# Patient Record
Sex: FEMALE | Race: WHITE | NOT HISPANIC OR LATINO | Employment: FULL TIME | URBAN - METROPOLITAN AREA
[De-identification: names, ages, dates, MRNs, and addresses within clinical notes are randomized per-mention and may not be internally consistent; named-entity substitution may affect disease eponyms.]

---

## 2017-01-16 DIAGNOSIS — R53.83 FATIGUE, UNSPECIFIED TYPE: ICD-10-CM

## 2017-01-16 DIAGNOSIS — R06.00 DYSPNEA, UNSPECIFIED TYPE: ICD-10-CM

## 2017-01-16 DIAGNOSIS — R06.00 DYSPNEA, UNSPECIFIED TYPE: Primary | ICD-10-CM

## 2017-01-24 ENCOUNTER — TRANSCRIBE ORDERS (OUTPATIENT)
Dept: CARDIOLOGY | Facility: HOSPITAL | Age: 52
End: 2017-01-24

## 2017-01-24 ENCOUNTER — APPOINTMENT (OUTPATIENT)
Dept: LAB | Facility: HOSPITAL | Age: 52
End: 2017-01-24

## 2017-01-24 ENCOUNTER — HOSPITAL ENCOUNTER (OUTPATIENT)
Dept: CARDIOLOGY | Facility: HOSPITAL | Age: 52
Discharge: HOME | End: 2017-01-24
Admitting: SURGERY

## 2017-01-24 ENCOUNTER — HOSPITAL ENCOUNTER (OUTPATIENT)
Dept: GENERAL RADIOLOGY | Facility: HOSPITAL | Age: 52
Discharge: HOME | End: 2017-01-24

## 2017-01-24 DIAGNOSIS — Z01.818 PRE-OP EVALUATION: Primary | ICD-10-CM

## 2017-01-24 LAB
ALBUMIN SERPL-MCNC: 4.5 G/DL (ref 3.2–4.8)
ALBUMIN/GLOB SERPL: 1.6 G/DL (ref 1.5–2.5)
ALP SERPL-CCNC: 102 U/L (ref 25–100)
ALT SERPL W P-5'-P-CCNC: 23 U/L (ref 7–40)
ANION GAP SERPL CALCULATED.3IONS-SCNC: 6 MMOL/L (ref 3–11)
AST SERPL-CCNC: 21 U/L (ref 0–33)
BASOPHILS # BLD AUTO: 0.02 10*3/MM3 (ref 0–0.2)
BASOPHILS NFR BLD AUTO: 0.3 % (ref 0–1)
BILIRUB SERPL-MCNC: 0.6 MG/DL (ref 0.3–1.2)
BUN BLD-MCNC: 25 MG/DL (ref 9–23)
BUN/CREAT SERPL: 27.8 (ref 7–25)
CALCIUM SPEC-SCNC: 10.3 MG/DL (ref 8.7–10.4)
CHLORIDE SERPL-SCNC: 104 MMOL/L (ref 99–109)
CO2 SERPL-SCNC: 32 MMOL/L (ref 20–31)
CREAT BLD-MCNC: 0.9 MG/DL (ref 0.6–1.3)
DEPRECATED RDW RBC AUTO: 42.8 FL (ref 37–54)
EOSINOPHIL # BLD AUTO: 0.11 10*3/MM3 (ref 0.1–0.3)
EOSINOPHIL NFR BLD AUTO: 1.5 % (ref 0–3)
ERYTHROCYTE [DISTWIDTH] IN BLOOD BY AUTOMATED COUNT: 13.7 % (ref 11.3–14.5)
GFR SERPL CREATININE-BSD FRML MDRD: 66 ML/MIN/1.73
GLOBULIN UR ELPH-MCNC: 2.9 GM/DL
GLUCOSE BLD-MCNC: 83 MG/DL (ref 70–100)
HCT VFR BLD AUTO: 41.7 % (ref 34.5–44)
HGB BLD-MCNC: 13.8 G/DL (ref 11.5–15.5)
IMM GRANULOCYTES # BLD: 0.01 10*3/MM3 (ref 0–0.03)
IMM GRANULOCYTES NFR BLD: 0.1 % (ref 0–0.6)
LYMPHOCYTES # BLD AUTO: 2.29 10*3/MM3 (ref 0.6–4.8)
LYMPHOCYTES NFR BLD AUTO: 31.2 % (ref 24–44)
MCH RBC QN AUTO: 28.2 PG (ref 27–31)
MCHC RBC AUTO-ENTMCNC: 33.1 G/DL (ref 32–36)
MCV RBC AUTO: 85.3 FL (ref 80–99)
MONOCYTES # BLD AUTO: 0.55 10*3/MM3 (ref 0–1)
MONOCYTES NFR BLD AUTO: 7.5 % (ref 0–12)
NEUTROPHILS # BLD AUTO: 4.36 10*3/MM3 (ref 1.5–8.3)
NEUTROPHILS NFR BLD AUTO: 59.4 % (ref 41–71)
PLATELET # BLD AUTO: 207 10*3/MM3 (ref 150–450)
PMV BLD AUTO: 9.6 FL (ref 6–12)
POTASSIUM BLD-SCNC: 4.5 MMOL/L (ref 3.5–5.5)
PROT SERPL-MCNC: 7.4 G/DL (ref 5.7–8.2)
RBC # BLD AUTO: 4.89 10*6/MM3 (ref 3.89–5.14)
SODIUM BLD-SCNC: 142 MMOL/L (ref 132–146)
WBC NRBC COR # BLD: 7.34 10*3/MM3 (ref 3.5–10.8)

## 2017-01-24 PROCEDURE — 85025 COMPLETE CBC W/AUTO DIFF WBC: CPT | Performed by: PHYSICIAN ASSISTANT

## 2017-01-24 PROCEDURE — 36415 COLL VENOUS BLD VENIPUNCTURE: CPT

## 2017-01-24 PROCEDURE — 93005 ELECTROCARDIOGRAM TRACING: CPT | Performed by: SURGERY

## 2017-01-24 PROCEDURE — 71020 HC CHEST PA AND LATERAL: CPT

## 2017-01-24 PROCEDURE — 93010 ELECTROCARDIOGRAM REPORT: CPT | Performed by: INTERNAL MEDICINE

## 2017-01-24 PROCEDURE — 80053 COMPREHEN METABOLIC PANEL: CPT | Performed by: PHYSICIAN ASSISTANT

## 2017-01-31 ENCOUNTER — DOCUMENTATION (OUTPATIENT)
Dept: BARIATRICS/WEIGHT MGMT | Facility: CLINIC | Age: 52
End: 2017-01-31

## 2017-01-31 ENCOUNTER — CONSULT (OUTPATIENT)
Dept: BARIATRICS/WEIGHT MGMT | Facility: CLINIC | Age: 52
End: 2017-01-31

## 2017-01-31 VITALS
DIASTOLIC BLOOD PRESSURE: 72 MMHG | TEMPERATURE: 97.4 F | BODY MASS INDEX: 45.16 KG/M2 | WEIGHT: 264.5 LBS | SYSTOLIC BLOOD PRESSURE: 110 MMHG | HEIGHT: 64 IN | HEART RATE: 57 BPM

## 2017-01-31 DIAGNOSIS — E66.01 OBESITY, CLASS III, BMI 40-49.9 (MORBID OBESITY) (HCC): Primary | ICD-10-CM

## 2017-01-31 PROCEDURE — 99215 OFFICE O/P EST HI 40 MIN: CPT | Performed by: SURGERY

## 2017-01-31 PROCEDURE — 99407 BEHAV CHNG SMOKING > 10 MIN: CPT | Performed by: SURGERY

## 2017-01-31 RX ORDER — ACETAMINOPHEN 10 MG/ML
1000 INJECTION, SOLUTION INTRAVENOUS ONCE
Status: CANCELLED | OUTPATIENT
Start: 2017-01-31 | End: 2017-01-31

## 2017-01-31 RX ORDER — SODIUM CHLORIDE, SODIUM LACTATE, POTASSIUM CHLORIDE, CALCIUM CHLORIDE 600; 310; 30; 20 MG/100ML; MG/100ML; MG/100ML; MG/100ML
150 INJECTION, SOLUTION INTRAVENOUS CONTINUOUS
Status: CANCELLED | OUTPATIENT
Start: 2017-01-31

## 2017-01-31 RX ORDER — SCOLOPAMINE TRANSDERMAL SYSTEM 1 MG/1
1 PATCH, EXTENDED RELEASE TRANSDERMAL ONCE
Status: CANCELLED | OUTPATIENT
Start: 2017-01-31 | End: 2017-01-31

## 2017-01-31 RX ORDER — PANTOPRAZOLE SODIUM 40 MG/10ML
40 INJECTION, POWDER, LYOPHILIZED, FOR SOLUTION INTRAVENOUS ONCE
Status: CANCELLED | OUTPATIENT
Start: 2017-01-31 | End: 2017-01-31

## 2017-01-31 RX ORDER — CHLORHEXIDINE GLUCONATE 0.12 MG/ML
15 RINSE ORAL ONCE
Status: CANCELLED | OUTPATIENT
Start: 2017-01-31

## 2017-01-31 RX ORDER — SODIUM CHLORIDE 0.9 % (FLUSH) 0.9 %
1-10 SYRINGE (ML) INJECTION AS NEEDED
Status: CANCELLED | OUTPATIENT
Start: 2017-01-31

## 2017-01-31 NOTE — PROGRESS NOTES
"[facility] BARIATRIC SURGERY  [address]  [facility] [address] [phone]      Patient  Name:  Liliana Quevedo.  :  1965      Chief Complaint:  weight gain; unable to maintain weight loss. Preop LSG    History of Present Illness:    Liliana Quevedo is a 51 y.o. female who presents today for evaluation, education and consultation regarding weight loss surgery. The patient is interested in sleeve gastrectomy     • Length of time being obese: was six  years.  • Amount of time 100 lbs overweight: was four  years  Most weight lost was 65 lbs with weight watchers, but only able to maintain x 1 year.        Past Medical History   Diagnosis Date   • Allergic rhinitis    • Arthritis    • Carpal tunnel syndrome    • Colon polyp      SIGMOID   • Depression    • Dyspnea on exertion    • Elevated alkaline phosphatase level      102   • Fatigue    • Gastric ulcer    • GERD (gastroesophageal reflux disease)      on Protonix.  Says sx's not bad even if misses meds, \"depends on what I eat\".  EGD GDW  40 cm, path DE reflux.  serum h. pyl neg   • Hematuria    • Hyperlipidemia    • Hypertension      per prim MD note   • Hypothyroidism      w/ hx thyromegaly s/p CASTILLO.    • Incomplete right bundle branch block    • Low back pain    • Mild cardiomegaly    • Morbid obesity    • Nephrolithiasis    • Osteoporosis    • Peripheral edema    • Sleep apnea      CPAP compliant   • Stroke      STROKE SYNDROME   • Tendinitis    • Vitamin D deficiency      Past Surgical History   Procedure Laterality Date   • Ankle surgery Right      dislocation repair w/ plate    • Nose surgery     • Hysterectomy Bilateral      (open) w/ oophorectomy - uterine fibroids and ovarian cysts   • Colonoscopy       screening   • Laparoscopic cholecystectomy       for stones   • Knee surgery Left       ligament repair   • Carpal tunnel release       Allergies   Allergen Reactions   • Ampicillin Hives   • " Sulfa Antibiotics Nausea And Vomiting       Current Outpatient Prescriptions:   •  aspirin 81 MG tablet, Take  by mouth daily., Disp: , Rfl:   •  citalopram (CeleXA) 40 MG tablet, TAKE ONE TABLET BY MOUTH DAILY, Disp: 90 tablet, Rfl: 0  •  furosemide (LASIX) 40 MG tablet, TAKE ONE TABLET BY MOUTH DAILY AS NEEDED **MUST CALL MD FOR APPOINTMENT, Disp: 30 tablet, Rfl: 3  •  KLOR-CON 20 MEQ CR tablet, TAKE ONE TABLET BY MOUTH DAILY, Disp: 30 tablet, Rfl: 2  •  levothyroxine (SYNTHROID, LEVOTHROID) 150 MCG tablet, Take  by mouth daily., Disp: , Rfl:   •  omega-3 acid ethyl esters (LOVAZA) 1 G capsule, Take  by mouth daily., Disp: , Rfl:   •  pantoprazole (PROTONIX) 40 MG EC tablet, TAKE ONE TABLET BY MOUTH DAILY, Disp: 30 tablet, Rfl: 2  •  pravastatin (PRAVACHOL) 20 MG tablet, Take  by mouth daily., Disp: , Rfl:   Social History     Social History   • Marital status:      Spouse name: N/A   • Number of children: N/A   • Years of education: N/A     Occupational History   • Not on file.     Social History Main Topics   • Smoking status: Never Smoker   • Smokeless tobacco: Never Used   • Alcohol use No   • Drug use: No   • Sexual activity: Not on file     Other Topics Concern   • Not on file     Social History Narrative     Family History   Problem Relation Age of Onset   • Cancer Other      COLON CANCER   • Arthritis Other    • Sleep apnea Other    • Ovarian cancer Mother 58   • Obesity Mother    • Diabetes Mother    • Hypertension Mother    • Cancer Mother    • Breast cancer Maternal Aunt      60's   • Obesity Father    • Obesity Sister    • Obesity Brother    • Diabetes Brother    • Hypertension Brother    • Obesity Maternal Grandmother    • Diabetes Maternal Grandmother    • Hypertension Maternal Grandmother    • Heart attack Maternal Grandmother    • Sleep apnea Maternal Grandmother    • Cancer Maternal Grandmother        Review Of Systems   Systemic: Feeling tired (fatigue).  No fever and no chills.  Recent  weight gain.  Breasts: No mammogram concerns, no uterine cancer, and no ovarian cancer.  Patient has had 2 pregnancies previously.  Cardiovascular: No hypertension, no angina, and no ischemic heart disease.  Edema.  No deep venous thrombosis.  Pulmonary: No asthma.  Uses CPAP / BiPAP Suffers from sleep apnea.  No pulmonary embolism.  Dyspnea during exertion.  Gastrointestinal: No dysphagia.  Reports heartburn.  No liver disease, no irritable bowel, and no pancreatic disease.  Genitourinary: Urinary loss of control.  No kidney stones, no kidney failure, and no renal insuff.  Endocrine: No gout and no diabetes.  Dyslipidemia and hypothyroidism.  No abnormal glucose levels.  Hematologic: No bleeding disorder and no prior blood transfusions.  Uses of blood thinners.  No anemia.  Musculoskeletal: No fibromyalgia.  Osteoarthritis, neck pain, wrist joint pain, shoulder joint pain, back pain, hip joint pain, knee joint pain, and ankle joint pain.  Neurological: No migraines, no seizures, and no strokes.  Psychological: No anxiety.  Depression.  No bipolar disorder.  Skin: No history of MRSA skin infections.   All other systems reviewed/negative.    Physical Exam:  Vital Signs:  Weight: 264 lb 8 oz (120 kg)   Body mass index is 46.12 kg/(m^2).  Temp: 97.4 °F (36.3 °C)   Heart Rate: 57   BP: 110/72     Physical Exam   Constitutional: She is oriented to person, place, and time. She appears well-developed and well-nourished.   HENT:   Head: Normocephalic and atraumatic.   Eyes: Conjunctivae are normal. No scleral icterus.   Neck: Neck supple. Carotid bruit is not present. No thyromegaly present.   Cardiovascular: Normal rate and regular rhythm.    No murmur heard.  I do not apprec her murmur on today's exam   Pulmonary/Chest: Effort normal and breath sounds normal. No respiratory distress. She has no wheezes. She has no rales.   Abdominal: Soft. Bowel sounds are normal. She exhibits no distension and no mass. There is no  hepatosplenomegaly. There is no tenderness. No hernia.       Scars:  present; lap billy, pfannenstiel   Musculoskeletal: Normal range of motion. She exhibits no edema.   Neurological: She is alert and oriented to person, place, and time. Gait normal.   Skin: Skin is warm and dry. No rash noted.   Psychiatric: She has a normal mood and affect. Judgment normal.   Vitals reviewed.         Patient Active Problem List   Diagnosis   • Atopic rhinitis   • Anxiety   • Arthralgia of multiple joints   • Knee pain   • Radial styloid tenosynovitis   • Depression   • Edema   • Gastroesophageal reflux disease   • Hyperlipidemia   • Hypothyroidism   • Low back pain   • Adiposity   • Obstructive sleep apnea syndrome   • Onychomycosis of toenail   • Calculus of kidney   • Vitamin D deficiency   • Obesity due to excess calories   • GERD (gastroesophageal reflux disease)   • Sleep apnea   • Morbid obesity   • Fatigue   • Dyspnea on exertion   • Peripheral edema     Psych 6/16 Vahid carrera    Assessment:    Liliana Quevedo is a 51 y.o. year old female with medically complicated obesity pursuing sleeve gastrectomy.    Weight loss surgery is deemed medically necessary given the following obesity related comorbidities including sleep apnea, dyslipidemia, GERD and depression with current Weight: 264 lb 8 oz (120 kg) and Body mass index is 46.12 kg/(m^2)..    Patient is aware that surgery is a tool, and that weight loss is not guaranteed but only seen in the context of appropriate use, follow up and exercise.    Complications  of laparoscopic/possible robotic gastric sleeve were discussed. The patient is well aware of the potential complications of surgery that include but not limited to bleeding, infections, deep venous thrombosis, pulmonary embolism, pulmonary complications such as pneumonia, cardiac events, hernias, small bowel obstruction, damage to the spleen or other organs, bowel injury, disfiguring scars, failure to lose  "weight, need for additional surgery, conversion to an open procedure, and death. Patient is also aware of complications which apply in this particular procedure that can include but are not limited to a \"leak\" at the staple line which in some instances may require conversion to gastric bypass.    Greater than 10 minutes was spent with the patient discussing avoiding all tobacco products and second hand smoke at least 2 weeks pre-operatively and 6 weeks post-operatively to minimize the risk of sleeve leak.  Says kristie smokes outside and in his car only, not hers, will not ride in his car for the requisite period.    Discussed the risks, benefits and alternative therapies at great length as outlined in our extensive consent forms, consent videos, and educational teaching process under the direction of the center's .    A copy of the patient's signed informed consent is on file.      Plan:  The patient has been advised that a letter of medical support must be obtained from her primary care physician or referring provider. A psychological evaluation will be performed for this patient as well. Preoperative testing will include: CBC, CMP, Fasting Lipids, TSH, H.Pylori, CXR, EKG and EGD     Preop Cardiac clearance will be required prior to surgery.      The patient was advised to start a high protein and low carbohydrate diet.  The patient was given individualized information by our dietician along with general group information and instructions.     Information on VLADIMIR educational video was given to the patient.  This is an internet based educational video that explains the surgical procedure chosen and answers basic questions regarding that procedure.     Lastly, the consultation plan was reviewed with the patient.      Sergio Gibbons MD                                                   "

## 2017-01-31 NOTE — LETTER
"2017     Geremias Ramesh MD  [address]  [address]  [facility] [address]    Patient: Liliana Quevedo   YOB: 1965   Date of Visit: 2017       Dear Dr. Domitila MD:    Thank you for referring Liliana Quevedo to me for evaluation. Below are the relevant portions of my assessment and plan of care.    If you have questions, please do not hesitate to call me. I look forward to following Liliana along with you.         Sincerely,        Sergio Gibbons MD        CC: No Recipients  Sergio Gibbons MD  2017  4:16 PM  Signed  [facility] BARIATRIC SURGERY  [address]  [facility] [address] [phone]      Patient  Name:  Liliana Quevedo.  :  1965      Chief Complaint:  weight gain; unable to maintain weight loss. Preop LSG    History of Present Illness:    Liliana Quevedo is a 51 y.o. female who presents today for evaluation, education and consultation regarding weight loss surgery. The patient is interested in sleeve gastrectomy     • Length of time being obese: was six  years.  • Amount of time 100 lbs overweight: was four  years  Most weight lost was 65 lbs with weight watchers, but only able to maintain x 1 year.        Past Medical History   Diagnosis Date   • Allergic rhinitis    • Arthritis    • Carpal tunnel syndrome    • Colon polyp      SIGMOID   • Depression    • Dyspnea on exertion    • Elevated alkaline phosphatase level      102   • Fatigue    • Gastric ulcer    • GERD (gastroesophageal reflux disease)      on Protonix.  Says sx's not bad even if misses meds, \"depends on what I eat\".  EGD GDW  40 cm, path DE reflux.  serum h. pyl neg   • Hematuria    • Hyperlipidemia    • Hypertension      per prim MD note   • Hypothyroidism      w/ hx thyromegaly s/p CASTILLO.    • Incomplete right bundle branch block    • Low back pain    • Mild cardiomegaly    • Morbid obesity    • Nephrolithiasis    • Osteoporosis    • Peripheral " edema    • Sleep apnea      CPAP compliant   • Stroke      STROKE SYNDROME   • Tendinitis    • Vitamin D deficiency      Past Surgical History   Procedure Laterality Date   • Ankle surgery Right 2000     dislocation repair w/ plate    • Nose surgery     • Hysterectomy Bilateral 2004     (open) w/ oophorectomy - uterine fibroids and ovarian cysts   • Colonoscopy  2011     screening   • Laparoscopic cholecystectomy  2011     for stones   • Knee surgery Left 2015      ligament repair   • Carpal tunnel release       Allergies   Allergen Reactions   • Ampicillin Hives   • Sulfa Antibiotics Nausea And Vomiting       Current Outpatient Prescriptions:   •  aspirin 81 MG tablet, Take  by mouth daily., Disp: , Rfl:   •  citalopram (CeleXA) 40 MG tablet, TAKE ONE TABLET BY MOUTH DAILY, Disp: 90 tablet, Rfl: 0  •  furosemide (LASIX) 40 MG tablet, TAKE ONE TABLET BY MOUTH DAILY AS NEEDED **MUST CALL MD FOR APPOINTMENT, Disp: 30 tablet, Rfl: 3  •  KLOR-CON 20 MEQ CR tablet, TAKE ONE TABLET BY MOUTH DAILY, Disp: 30 tablet, Rfl: 2  •  levothyroxine (SYNTHROID, LEVOTHROID) 150 MCG tablet, Take  by mouth daily., Disp: , Rfl:   •  omega-3 acid ethyl esters (LOVAZA) 1 G capsule, Take  by mouth daily., Disp: , Rfl:   •  pantoprazole (PROTONIX) 40 MG EC tablet, TAKE ONE TABLET BY MOUTH DAILY, Disp: 30 tablet, Rfl: 2  •  pravastatin (PRAVACHOL) 20 MG tablet, Take  by mouth daily., Disp: , Rfl:   Social History     Social History   • Marital status:      Spouse name: N/A   • Number of children: N/A   • Years of education: N/A     Occupational History   • Not on file.     Social History Main Topics   • Smoking status: Never Smoker   • Smokeless tobacco: Never Used   • Alcohol use No   • Drug use: No   • Sexual activity: Not on file     Other Topics Concern   • Not on file     Social History Narrative     Family History   Problem Relation Age of Onset   • Cancer Other      COLON CANCER   • Arthritis Other    • Sleep apnea Other    •  Ovarian cancer Mother 58   • Obesity Mother    • Diabetes Mother    • Hypertension Mother    • Cancer Mother    • Breast cancer Maternal Aunt      60's   • Obesity Father    • Obesity Sister    • Obesity Brother    • Diabetes Brother    • Hypertension Brother    • Obesity Maternal Grandmother    • Diabetes Maternal Grandmother    • Hypertension Maternal Grandmother    • Heart attack Maternal Grandmother    • Sleep apnea Maternal Grandmother    • Cancer Maternal Grandmother        Review Of Systems   Systemic: Feeling tired (fatigue).  No fever and no chills.  Recent weight gain.  Breasts: No mammogram concerns, no uterine cancer, and no ovarian cancer.  Patient has had 2 pregnancies previously.  Cardiovascular: No hypertension, no angina, and no ischemic heart disease.  Edema.  No deep venous thrombosis.  Pulmonary: No asthma.  Uses CPAP / BiPAP Suffers from sleep apnea.  No pulmonary embolism.  Dyspnea during exertion.  Gastrointestinal: No dysphagia.  Reports heartburn.  No liver disease, no irritable bowel, and no pancreatic disease.  Genitourinary: Urinary loss of control.  No kidney stones, no kidney failure, and no renal insuff.  Endocrine: No gout and no diabetes.  Dyslipidemia and hypothyroidism.  No abnormal glucose levels.  Hematologic: No bleeding disorder and no prior blood transfusions.  Uses of blood thinners.  No anemia.  Musculoskeletal: No fibromyalgia.  Osteoarthritis, neck pain, wrist joint pain, shoulder joint pain, back pain, hip joint pain, knee joint pain, and ankle joint pain.  Neurological: No migraines, no seizures, and no strokes.  Psychological: No anxiety.  Depression.  No bipolar disorder.  Skin: No history of MRSA skin infections.   All other systems reviewed/negative.    Physical Exam:  Vital Signs:  Weight: 264 lb 8 oz (120 kg)   Body mass index is 46.12 kg/(m^2).  Temp: 97.4 °F (36.3 °C)   Heart Rate: 57   BP: 110/72     Physical Exam   Constitutional: She is oriented to person,  place, and time. She appears well-developed and well-nourished.   HENT:   Head: Normocephalic and atraumatic.   Eyes: Conjunctivae are normal. No scleral icterus.   Neck: Neck supple. Carotid bruit is not present. No thyromegaly present.   Cardiovascular: Normal rate and regular rhythm.    No murmur heard.  I do not apprec her murmur on today's exam   Pulmonary/Chest: Effort normal and breath sounds normal. No respiratory distress. She has no wheezes. She has no rales.   Abdominal: Soft. Bowel sounds are normal. She exhibits no distension and no mass. There is no hepatosplenomegaly. There is no tenderness. No hernia.       Scars:  present; lap billy, pfannenstiel   Musculoskeletal: Normal range of motion. She exhibits no edema.   Neurological: She is alert and oriented to person, place, and time. Gait normal.   Skin: Skin is warm and dry. No rash noted.   Psychiatric: She has a normal mood and affect. Judgment normal.   Vitals reviewed.         Patient Active Problem List   Diagnosis   • Atopic rhinitis   • Anxiety   • Arthralgia of multiple joints   • Knee pain   • Radial styloid tenosynovitis   • Depression   • Edema   • Gastroesophageal reflux disease   • Hyperlipidemia   • Hypothyroidism   • Low back pain   • Adiposity   • Obstructive sleep apnea syndrome   • Onychomycosis of toenail   • Calculus of kidney   • Vitamin D deficiency   • Obesity due to excess calories   • GERD (gastroesophageal reflux disease)   • Sleep apnea   • Morbid obesity   • Fatigue   • Dyspnea on exertion   • Peripheral edema     Psych 6/16 Vahid Arnold neg    Assessment:    Liliana Quevedo is a 51 y.o. year old female with medically complicated obesity pursuing sleeve gastrectomy.    Weight loss surgery is deemed medically necessary given the following obesity related comorbidities including sleep apnea, dyslipidemia, GERD and depression with current Weight: 264 lb 8 oz (120 kg) and Body mass index is 46.12 kg/(m^2)..    Patient is  "aware that surgery is a tool, and that weight loss is not guaranteed but only seen in the context of appropriate use, follow up and exercise.    Complications  of laparoscopic/possible robotic gastric sleeve were discussed. The patient is well aware of the potential complications of surgery that include but not limited to bleeding, infections, deep venous thrombosis, pulmonary embolism, pulmonary complications such as pneumonia, cardiac events, hernias, small bowel obstruction, damage to the spleen or other organs, bowel injury, disfiguring scars, failure to lose weight, need for additional surgery, conversion to an open procedure, and death. Patient is also aware of complications which apply in this particular procedure that can include but are not limited to a \"leak\" at the staple line which in some instances may require conversion to gastric bypass.    Greater than 10 minutes was spent with the patient discussing avoiding all tobacco products and second hand smoke at least 2 weeks pre-operatively and 6 weeks post-operatively to minimize the risk of sleeve leak.  Says kristie smokes outside and in his car only, not hers, will not ride in his car for the requisite period.    Discussed the risks, benefits and alternative therapies at great length as outlined in our extensive consent forms, consent videos, and educational teaching process under the direction of the center's .    A copy of the patient's signed informed consent is on file.      Plan:  The patient has been advised that a letter of medical support must be obtained from her primary care physician or referring provider. A psychological evaluation will be performed for this patient as well. Preoperative testing will include: CBC, CMP, Fasting Lipids, TSH, H.Pylori, CXR, EKG and EGD     Preop Cardiac clearance will be required prior to surgery.      The patient was advised to start a high protein and low carbohydrate diet.  The patient was " given individualized information by our dietician along with general group information and instructions.     Information on VLADIMIR educational video was given to the patient.  This is an internet based educational video that explains the surgical procedure chosen and answers basic questions regarding that procedure.     Lastly, the consultation plan was reviewed with the patient.      Sergio Gibbons MD

## 2017-01-31 NOTE — H&P
Create Note                         My Note 4:07 PM             Edit                            Expand All Collapse All            [facility] BARIATRIC SURGERY    [address]    [facility] [address] [phone]              Patient  Name:  Liliana Quevedo.    :  1965              Chief Complaint:  weight gain; unable to maintain weight loss. Preop LSG         History of Present Illness:         Liliana Quevedo is a 51 y.o. female who presents today for evaluation, education and consultation regarding weight loss surgery. The patient is interested in sleeve gastrectomy                            • Length of time being obese: was six  years.  • Amount of time 100 lbs overweight: was four  years    Most weight lost was 65 lbs with weight watchers, but only able to maintain x 1 year.                     Medical History                                                                                                                                                                                                                                                                                                                                                                                                                                                                                                                                                                                                               Surgical History                                                                                                                                                                                                                                                                                                      Allergies       Allergen     Reactions       •     Ampicillin     Hives       •     Sulfa Antibiotics     Nausea And Vomiting                  Current Outpatient Prescriptions:     •  aspirin 81 MG tablet, Take  by mouth daily., Disp: , Rfl:     •  citalopram (CeleXA) 40 MG tablet, TAKE ONE TABLET BY MOUTH DAILY, Disp: 90 tablet, Rfl: 0    •  furosemide (LASIX) 40 MG tablet, TAKE ONE TABLET BY MOUTH DAILY AS NEEDED **MUST CALL MD FOR APPOINTMENT, Disp: 30 tablet, Rfl: 3    •  KLOR-CON 20 MEQ CR tablet, TAKE ONE TABLET BY MOUTH DAILY, Disp: 30 tablet, Rfl: 2    •  levothyroxine (SYNTHROID, LEVOTHROID) 150 MCG tablet, Take  by mouth daily., Disp: , Rfl:     •  omega-3 acid ethyl esters (LOVAZA) 1 G capsule, Take  by mouth daily., Disp: , Rfl:     •  pantoprazole (PROTONIX) 40 MG EC tablet, TAKE ONE TABLET BY MOUTH DAILY, Disp: 30 tablet, Rfl: 2    •  pravastatin (PRAVACHOL) 20 MG tablet, Take  by mouth daily., Disp: , Rfl:          Social History                                                                                                                                                                                                                                                                                                                 Family History       Problem     Relation     Age of Onset       •     Cancer     Other                         COLON CANCER       •     Arthritis     Other             •     Sleep apnea     Other             •     Ovarian cancer     Mother     58       •     Obesity     Mother             •     Diabetes     Mother             •     Hypertension     Mother             •     Cancer     Mother             •     Breast cancer     Maternal Aunt                         60's       •     Obesity     Father             •     Obesity     Sister             •     Obesity     Brother             •     Diabetes     Brother             •     Hypertension     Brother             •     Obesity     Maternal Grandmother             •     Diabetes     Maternal Grandmother             •     Hypertension     Maternal Grandmother              •     Heart attack     Maternal Grandmother             •     Sleep apnea     Maternal Grandmother             •     Cancer     Maternal Grandmother                       Review Of Systems     Systemic: Feeling tired (fatigue).  No fever and no chills.  Recent weight gain.    Breasts: No mammogram concerns, no uterine cancer, and no ovarian cancer.  Patient has had 2 pregnancies previously.    Cardiovascular: No hypertension, no angina, and no ischemic heart disease.  Edema.  No deep venous thrombosis.    Pulmonary: No asthma.  Uses CPAP / BiPAP Suffers from sleep apnea.  No pulmonary embolism.  Dyspnea during exertion.    Gastrointestinal: No dysphagia.  Reports heartburn.  No liver disease, no irritable bowel, and no pancreatic disease.    Genitourinary: Urinary loss of control.  No kidney stones, no kidney failure, and no renal insuff.    Endocrine: No gout and no diabetes.  Dyslipidemia and hypothyroidism.  No abnormal glucose levels.    Hematologic: No bleeding disorder and no prior blood transfusions.  Uses of blood thinners.  No anemia.    Musculoskeletal: No fibromyalgia.  Osteoarthritis, neck pain, wrist joint pain, shoulder joint pain, back pain, hip joint pain, knee joint pain, and ankle joint pain.    Neurological: No migraines, no seizures, and no strokes.    Psychological: No anxiety.  Depression.  No bipolar disorder.    Skin: No history of MRSA skin infections.     All other systems reviewed/negative.         Physical Exam:    Vital Signs:    Weight: 264 lb 8 oz (120 kg)     Body mass index is 46.12 kg/(m^2).    Temp: 97.4 °F (36.3 °C)     Heart Rate: 57     BP: 110/72          Physical Exam     Constitutional: She is oriented to person, place, and time. She appears well-developed and well-nourished.     HENT:     Head: Normocephalic and atraumatic.     Eyes: Conjunctivae are normal. No scleral icterus.     Neck: Neck supple. Carotid bruit is not present. No thyromegaly present.      Cardiovascular: Normal rate and regular rhythm.      No murmur heard.  I do not apprec her murmur on today's exam     Pulmonary/Chest: Effort normal and breath sounds normal. No respiratory distress. She has no wheezes. She has no rales.     Abdominal: Soft. Bowel sounds are normal. She exhibits no distension and no mass. There is no hepatosplenomegaly. There is no tenderness. No hernia.       Scars:  present; lap billy, pfannenstiel   Musculoskeletal: Normal range of motion. She exhibits no edema.   Neurological: She is alert and oriented to person, place, and time. Gait normal.     Skin: Skin is warm and dry. No rash noted.   Psychiatric: She has a normal mood and affect. Judgment normal.     Vitals reviewed.                          Patient Active Problem List       Diagnosis       •     Atopic rhinitis       •     Anxiety       •     Arthralgia of multiple joints       •     Knee pain       •     Radial styloid tenosynovitis       •     Depression       •     Edema       •     Gastroesophageal reflux disease       •     Hyperlipidemia       •     Hypothyroidism       •     Low back pain       •     Adiposity       •     Obstructive sleep apnea syndrome       •     Onychomycosis of toenail       •     Calculus of kidney       •     Vitamin D deficiency       •     Obesity due to excess calories       •     GERD (gastroesophageal reflux disease)       •     Sleep apnea       •     Morbid obesity       •     Fatigue       •     Dyspnea on exertion       •     Peripheral edema            Psych 6/16 Brown appro    Clayton neg         Assessment:         Liliana Quevedo is a 51 y.o. year old female with medically complicated obesity pursuing sleeve gastrectomy.         Weight loss surgery is deemed medically necessary given the following obesity related comorbidities including sleep apnea, dyslipidemia, GERD and depression with current Weight: 264 lb 8 oz (120 kg) and Body mass index is 46.12 kg/(m^2)..      "    Patient is aware that surgery is a tool, and that weight loss is not guaranteed but only seen in the context of appropriate use, follow up and exercise.         Complications  of laparoscopic/possible robotic gastric sleeve were discussed. The patient is well aware of the potential complications of surgery that include but not limited to bleeding, infections, deep venous thrombosis, pulmonary embolism, pulmonary complications such as pneumonia, cardiac events, hernias, small bowel obstruction, damage to the spleen or other organs, bowel injury, disfiguring scars, failure to lose weight, need for additional surgery, conversion to an open procedure, and death. Patient is also aware of complications which apply in this particular procedure that can include but are not limited to a \"leak\" at the staple line which in some instances may require conversion to gastric bypass.         Greater than 10 minutes was spent with the patient discussing avoiding all tobacco products and second hand smoke at least 2 weeks pre-operatively and 6 weeks post-operatively to minimize the risk of sleeve leak.  Says kristie smokes outside and in his car only, not hers, will not ride in his car for the requisite period.         Discussed the risks, benefits and alternative therapies at great length as outlined in our extensive consent forms, consent videos, and educational teaching process under the direction of the center's .         A copy of the patient's signed informed consent is on file.              Plan:    The patient has been advised that a letter of medical support must be obtained from her primary care physician or referring provider. A psychological evaluation will be performed for this patient as well. Preoperative testing will include: CBC, CMP, Fasting Lipids, TSH, H.Pylori, CXR, EKG and EGD          Preop Cardiac clearance will be required prior to surgery.           The patient was advised to start a high " protein and low carbohydrate diet.  The patient was given individualized information by our dietician along with general group information and instructions.          Information on VLADIMIR educational video was given to the patient.  This is an internet based educational video that explains the surgical procedure chosen and answers basic questions regarding that procedure.          Lastly, the consultation plan was reviewed with the patient.              Sergio Gibbons MD

## 2017-02-08 RX ORDER — POTASSIUM CHLORIDE 1500 MG/1
TABLET, EXTENDED RELEASE ORAL
Qty: 30 TABLET | Refills: 1 | Status: SHIPPED | OUTPATIENT
Start: 2017-02-08 | End: 2017-02-20

## 2017-02-20 ENCOUNTER — APPOINTMENT (OUTPATIENT)
Dept: PREADMISSION TESTING | Facility: HOSPITAL | Age: 52
End: 2017-02-20

## 2017-02-20 LAB
DEPRECATED RDW RBC AUTO: 42.5 FL (ref 37–54)
ERYTHROCYTE [DISTWIDTH] IN BLOOD BY AUTOMATED COUNT: 13.7 % (ref 11.3–14.5)
HBA1C MFR BLD: 5.4 % (ref 4.8–5.6)
HCT VFR BLD AUTO: 41.1 % (ref 34.5–44)
HGB BLD-MCNC: 13.9 G/DL (ref 11.5–15.5)
MCH RBC QN AUTO: 28.9 PG (ref 27–31)
MCHC RBC AUTO-ENTMCNC: 33.8 G/DL (ref 32–36)
MCV RBC AUTO: 85.4 FL (ref 80–99)
PLATELET # BLD AUTO: 194 10*3/MM3 (ref 150–450)
PMV BLD AUTO: 9.4 FL (ref 6–12)
POTASSIUM BLD-SCNC: 3.9 MMOL/L (ref 3.5–5.5)
RBC # BLD AUTO: 4.81 10*6/MM3 (ref 3.89–5.14)
WBC NRBC COR # BLD: 4.39 10*3/MM3 (ref 3.5–10.8)

## 2017-02-20 RX ORDER — FUROSEMIDE 40 MG/1
40 TABLET ORAL DAILY PRN
COMMUNITY
End: 2017-02-23 | Stop reason: HOSPADM

## 2017-02-20 RX ORDER — CITALOPRAM 40 MG/1
40 TABLET ORAL DAILY
COMMUNITY
End: 2017-05-22

## 2017-02-20 RX ORDER — PANTOPRAZOLE SODIUM 40 MG/1
40 TABLET, DELAYED RELEASE ORAL EVERY EVENING
COMMUNITY
End: 2017-05-22

## 2017-02-20 NOTE — DISCHARGE INSTRUCTIONS
The following information and instructions were given:    NPO after MN except sips of water with routine prescribed medication (except blood thinner, diabetes, or weight reducing medication) unless otherwise instructed by your physician.  Do not eat, drink, smoke or chew gum after MN the night before surgery. This also includes no mints.    DO NOT shave, wear makeup or dark nail polish.    Remove all jewelry (advised to go to jeweler if unable to remove).    Leave anything you consider valuable at home.    Leave your suitcase in the car until after your surgery.    Bring the following with you (if applicable)   -picture ID and insurance cards   -Co-pay/deductible required by insurance   -Medications in the original bottles (not a list) including all over-the-counter  medications if not brought to PAT   -Copy of advance directive, living will or power of  documents if not  brought to PAT   -CPAP or BIPAP mask and tubing (do not bring machine)   -Skin prep instructions sheet   -PAT Pass   Education booklet, brochure, handout or binder given to patient.    Pain Control After Surgery handout given to patient.    Respirex use (handout given to patient) and pneumonia prevention.    Signs and Symptoms of infection.    DVT Prevention stressing the importance of ambulation.    Patient to apply Chlorhexadine wipes to surgical area (as instructed) the night before procedure and morning of procedure  Post sleeve sheet given

## 2017-02-22 ENCOUNTER — ANESTHESIA (OUTPATIENT)
Dept: PERIOP | Facility: HOSPITAL | Age: 52
End: 2017-02-22

## 2017-02-22 ENCOUNTER — ANESTHESIA EVENT (OUTPATIENT)
Dept: PERIOP | Facility: HOSPITAL | Age: 52
End: 2017-02-22

## 2017-02-22 ENCOUNTER — HOSPITAL ENCOUNTER (INPATIENT)
Facility: HOSPITAL | Age: 52
LOS: 1 days | Discharge: HOME | End: 2017-02-23
Attending: SURGERY | Admitting: SURGERY

## 2017-02-22 DIAGNOSIS — E66.01 OBESITY, CLASS III, BMI 40-49.9 (MORBID OBESITY) (HCC): ICD-10-CM

## 2017-02-22 PROBLEM — E66.813 OBESITY, CLASS III, BMI 40-49.9 (MORBID OBESITY): Status: ACTIVE | Noted: 2017-02-22

## 2017-02-22 PROCEDURE — C1763 CONN TISS, NON-HUMAN: HCPCS | Performed by: SURGERY

## 2017-02-22 PROCEDURE — 25010000002 DEXAMETHASONE SODIUM PHOSPHATE 10 MG/ML SOLUTION: Performed by: NURSE ANESTHETIST, CERTIFIED REGISTERED

## 2017-02-22 PROCEDURE — 25010000002 MORPHINE PER 10 MG: Performed by: SURGERY

## 2017-02-22 PROCEDURE — 43775 LAP SLEEVE GASTRECTOMY: CPT | Performed by: SURGERY

## 2017-02-22 PROCEDURE — 0DB64Z3 EXCISION OF STOMACH, PERCUTANEOUS ENDOSCOPIC APPROACH, VERTICAL: ICD-10-PCS | Performed by: SURGERY

## 2017-02-22 PROCEDURE — 25010000002 ENOXAPARIN PER 10 MG: Performed by: SURGERY

## 2017-02-22 PROCEDURE — 88307 TISSUE EXAM BY PATHOLOGIST: CPT | Performed by: SURGERY

## 2017-02-22 PROCEDURE — 25010000002 PROPOFOL 10 MG/ML EMULSION: Performed by: NURSE ANESTHETIST, CERTIFIED REGISTERED

## 2017-02-22 PROCEDURE — 0DJ08ZZ INSPECTION OF UPPER INTESTINAL TRACT, VIA NATURAL OR ARTIFICIAL OPENING ENDOSCOPIC: ICD-10-PCS | Performed by: SURGERY

## 2017-02-22 PROCEDURE — 25010000002 NEOSTIGMINE 10 MG/10ML SOLUTION: Performed by: NURSE ANESTHETIST, CERTIFIED REGISTERED

## 2017-02-22 PROCEDURE — 25010000002 BUPRENORPHINE PER 0.1 MG: Performed by: NURSE ANESTHETIST, CERTIFIED REGISTERED

## 2017-02-22 PROCEDURE — 25010000002 FENTANYL CITRATE (PF) 100 MCG/2ML SOLUTION: Performed by: NURSE ANESTHETIST, CERTIFIED REGISTERED

## 2017-02-22 DEVICE — PERI-STRIPS DRY WITH VERITAS COLLAGEN MATRIX (PSD-V) IS PREPARED FROM DEHYDRATED BOVINE PERICARDIUM PROCURED FROM CATTLE UNDER 30 MONTHS OF AGE IN THE UNITED STATES. ONE (1) TUBE OF PSD GEL (GEL) IS PROVIDED FOR EVERY TWO (2) POUCHES OF PSD-V. THE GEL IS USED TO CREATE A TEMPORARY BOND BETWEEN THE PSD-V BUTTRESS AND THE SURGICAL STAPLER JAWS UNTIL THE STAPLER IS POSITIONED AND FIRED.
Type: IMPLANTABLE DEVICE | Site: STOMACH | Status: FUNCTIONAL
Brand: PERI-STRIPS DRY WITH VERITAS COLLAGEN MATRIX

## 2017-02-22 DEVICE — SEALANT FIBRIN TISSEEL FZ 4ML: Type: IMPLANTABLE DEVICE | Site: STOMACH | Status: FUNCTIONAL

## 2017-02-22 DEVICE — INJ DUPLOJECT TISSEEL 4ML: Type: IMPLANTABLE DEVICE | Site: STOMACH | Status: FUNCTIONAL

## 2017-02-22 RX ORDER — HYDROMORPHONE HYDROCHLORIDE 2 MG/1
2 TABLET ORAL EVERY 4 HOURS PRN
Status: DISCONTINUED | OUTPATIENT
Start: 2017-02-22 | End: 2017-02-23 | Stop reason: HOSPADM

## 2017-02-22 RX ORDER — LORAZEPAM 2 MG/ML
0.5 INJECTION INTRAMUSCULAR EVERY 12 HOURS PRN
Status: DISCONTINUED | OUTPATIENT
Start: 2017-02-22 | End: 2017-02-23 | Stop reason: HOSPADM

## 2017-02-22 RX ORDER — CLONIDINE HYDROCHLORIDE 0.1 MG/1
0.1 TABLET ORAL EVERY 6 HOURS PRN
Status: DISCONTINUED | OUTPATIENT
Start: 2017-02-22 | End: 2017-02-23 | Stop reason: HOSPADM

## 2017-02-22 RX ORDER — BUPRENORPHINE HYDROCHLORIDE 0.32 MG/ML
INJECTION INTRAMUSCULAR; INTRAVENOUS AS NEEDED
Status: DISCONTINUED | OUTPATIENT
Start: 2017-02-22 | End: 2017-02-22 | Stop reason: SURG

## 2017-02-22 RX ORDER — LEVOTHYROXINE SODIUM 0.15 MG/1
150 TABLET ORAL
Status: DISCONTINUED | OUTPATIENT
Start: 2017-02-23 | End: 2017-02-23 | Stop reason: HOSPADM

## 2017-02-22 RX ORDER — SODIUM CHLORIDE 9 MG/ML
INJECTION, SOLUTION INTRAVENOUS AS NEEDED
Status: DISCONTINUED | OUTPATIENT
Start: 2017-02-22 | End: 2017-02-22 | Stop reason: HOSPADM

## 2017-02-22 RX ORDER — BUPIVACAINE HYDROCHLORIDE AND EPINEPHRINE 2.5; 5 MG/ML; UG/ML
INJECTION, SOLUTION EPIDURAL; INFILTRATION; INTRACAUDAL; PERINEURAL AS NEEDED
Status: DISCONTINUED | OUTPATIENT
Start: 2017-02-22 | End: 2017-02-22 | Stop reason: HOSPADM

## 2017-02-22 RX ORDER — ROCURONIUM BROMIDE 10 MG/ML
INJECTION, SOLUTION INTRAVENOUS AS NEEDED
Status: DISCONTINUED | OUTPATIENT
Start: 2017-02-22 | End: 2017-02-22 | Stop reason: SURG

## 2017-02-22 RX ORDER — GLYCOPYRROLATE 0.2 MG/ML
INJECTION INTRAMUSCULAR; INTRAVENOUS AS NEEDED
Status: DISCONTINUED | OUTPATIENT
Start: 2017-02-22 | End: 2017-02-22 | Stop reason: SURG

## 2017-02-22 RX ORDER — LABETALOL HYDROCHLORIDE 5 MG/ML
10 INJECTION, SOLUTION INTRAVENOUS
Status: DISCONTINUED | OUTPATIENT
Start: 2017-02-22 | End: 2017-02-23 | Stop reason: HOSPADM

## 2017-02-22 RX ORDER — SODIUM CHLORIDE, SODIUM LACTATE, POTASSIUM CHLORIDE, CALCIUM CHLORIDE 600; 310; 30; 20 MG/100ML; MG/100ML; MG/100ML; MG/100ML
9 INJECTION, SOLUTION INTRAVENOUS CONTINUOUS PRN
Status: CANCELLED | OUTPATIENT
Start: 2017-02-22

## 2017-02-22 RX ORDER — SCOLOPAMINE TRANSDERMAL SYSTEM 1 MG/1
1 PATCH, EXTENDED RELEASE TRANSDERMAL ONCE
Status: DISCONTINUED | OUTPATIENT
Start: 2017-02-22 | End: 2017-02-22

## 2017-02-22 RX ORDER — SODIUM CHLORIDE 0.9 % (FLUSH) 0.9 %
1-10 SYRINGE (ML) INJECTION AS NEEDED
Status: CANCELLED | OUTPATIENT
Start: 2017-02-22

## 2017-02-22 RX ORDER — HYDROMORPHONE HYDROCHLORIDE 1 MG/ML
0.5 INJECTION, SOLUTION INTRAMUSCULAR; INTRAVENOUS; SUBCUTANEOUS
Status: DISCONTINUED | OUTPATIENT
Start: 2017-02-22 | End: 2017-02-22 | Stop reason: HOSPADM

## 2017-02-22 RX ORDER — LIDOCAINE HYDROCHLORIDE 10 MG/ML
0.5 INJECTION, SOLUTION EPIDURAL; INFILTRATION; INTRACAUDAL; PERINEURAL ONCE AS NEEDED
Status: CANCELLED | OUTPATIENT
Start: 2017-02-22

## 2017-02-22 RX ORDER — ONDANSETRON 4 MG/1
4 TABLET, FILM COATED ORAL EVERY 6 HOURS PRN
Status: DISCONTINUED | OUTPATIENT
Start: 2017-02-22 | End: 2017-02-23 | Stop reason: HOSPADM

## 2017-02-22 RX ORDER — PANTOPRAZOLE SODIUM 40 MG/10ML
40 INJECTION, POWDER, LYOPHILIZED, FOR SOLUTION INTRAVENOUS ONCE
Status: COMPLETED | OUTPATIENT
Start: 2017-02-22 | End: 2017-02-22

## 2017-02-22 RX ORDER — FIBRINOGEN HUMAN AND THROMBIN HUMAN 4 ML
KIT TOPICAL AS NEEDED
Status: DISCONTINUED | OUTPATIENT
Start: 2017-02-22 | End: 2017-02-22 | Stop reason: HOSPADM

## 2017-02-22 RX ORDER — ACETAMINOPHEN 10 MG/ML
1000 INJECTION, SOLUTION INTRAVENOUS EVERY 6 HOURS
Status: COMPLETED | OUTPATIENT
Start: 2017-02-22 | End: 2017-02-23

## 2017-02-22 RX ORDER — CYANOCOBALAMIN 1000 UG/ML
1000 INJECTION, SOLUTION INTRAMUSCULAR; SUBCUTANEOUS ONCE
Status: COMPLETED | OUTPATIENT
Start: 2017-02-23 | End: 2017-02-23

## 2017-02-22 RX ORDER — MAGNESIUM HYDROXIDE 1200 MG/15ML
LIQUID ORAL AS NEEDED
Status: DISCONTINUED | OUTPATIENT
Start: 2017-02-22 | End: 2017-02-22 | Stop reason: HOSPADM

## 2017-02-22 RX ORDER — FAMOTIDINE 20 MG/1
20 TABLET, FILM COATED ORAL
Status: CANCELLED | OUTPATIENT
Start: 2017-02-22

## 2017-02-22 RX ORDER — MORPHINE SULFATE 4 MG/ML
4 INJECTION, SOLUTION INTRAMUSCULAR; INTRAVENOUS
Status: DISCONTINUED | OUTPATIENT
Start: 2017-02-22 | End: 2017-02-23 | Stop reason: HOSPADM

## 2017-02-22 RX ORDER — PROMETHAZINE HYDROCHLORIDE 25 MG/ML
12.5 INJECTION, SOLUTION INTRAMUSCULAR; INTRAVENOUS EVERY 4 HOURS PRN
Status: DISCONTINUED | OUTPATIENT
Start: 2017-02-22 | End: 2017-02-23 | Stop reason: HOSPADM

## 2017-02-22 RX ORDER — DIPHENHYDRAMINE HYDROCHLORIDE 50 MG/ML
25 INJECTION INTRAMUSCULAR; INTRAVENOUS EVERY 4 HOURS PRN
Status: DISCONTINUED | OUTPATIENT
Start: 2017-02-22 | End: 2017-02-23 | Stop reason: HOSPADM

## 2017-02-22 RX ORDER — PROMETHAZINE HYDROCHLORIDE 25 MG/ML
12.5 INJECTION, SOLUTION INTRAMUSCULAR; INTRAVENOUS EVERY 6 HOURS PRN
Status: DISCONTINUED | OUTPATIENT
Start: 2017-02-22 | End: 2017-02-23 | Stop reason: HOSPADM

## 2017-02-22 RX ORDER — ACETAMINOPHEN 10 MG/ML
1000 INJECTION, SOLUTION INTRAVENOUS ONCE
Status: COMPLETED | OUTPATIENT
Start: 2017-02-22 | End: 2017-02-22

## 2017-02-22 RX ORDER — SODIUM CHLORIDE, SODIUM LACTATE, POTASSIUM CHLORIDE, CALCIUM CHLORIDE 600; 310; 30; 20 MG/100ML; MG/100ML; MG/100ML; MG/100ML
150 INJECTION, SOLUTION INTRAVENOUS CONTINUOUS
Status: DISCONTINUED | OUTPATIENT
Start: 2017-02-22 | End: 2017-02-22 | Stop reason: HOSPADM

## 2017-02-22 RX ORDER — BUPIVACAINE HCL/0.9 % NACL/PF 0.1 %
3 PLASTIC BAG, INJECTION (ML) EPIDURAL EVERY 8 HOURS
Status: COMPLETED | OUTPATIENT
Start: 2017-02-22 | End: 2017-02-23

## 2017-02-22 RX ORDER — BUPIVACAINE HYDROCHLORIDE 2.5 MG/ML
INJECTION, SOLUTION EPIDURAL; INFILTRATION; INTRACAUDAL AS NEEDED
Status: DISCONTINUED | OUTPATIENT
Start: 2017-02-22 | End: 2017-02-22 | Stop reason: SURG

## 2017-02-22 RX ORDER — ONDANSETRON 2 MG/ML
4 INJECTION INTRAMUSCULAR; INTRAVENOUS EVERY 6 HOURS PRN
Status: DISCONTINUED | OUTPATIENT
Start: 2017-02-22 | End: 2017-02-23 | Stop reason: HOSPADM

## 2017-02-22 RX ORDER — PRAVASTATIN SODIUM 20 MG
20 TABLET ORAL NIGHTLY
Status: DISCONTINUED | OUTPATIENT
Start: 2017-02-22 | End: 2017-02-23 | Stop reason: HOSPADM

## 2017-02-22 RX ORDER — NEOSTIGMINE METHYLSULFATE 1 MG/ML
INJECTION, SOLUTION INTRAVENOUS AS NEEDED
Status: DISCONTINUED | OUTPATIENT
Start: 2017-02-22 | End: 2017-02-22 | Stop reason: SURG

## 2017-02-22 RX ORDER — NALOXONE HCL 0.4 MG/ML
0.4 VIAL (ML) INJECTION
Status: DISCONTINUED | OUTPATIENT
Start: 2017-02-22 | End: 2017-02-23 | Stop reason: HOSPADM

## 2017-02-22 RX ORDER — METOCLOPRAMIDE HYDROCHLORIDE 5 MG/ML
10 INJECTION INTRAMUSCULAR; INTRAVENOUS EVERY 6 HOURS PRN
Status: DISCONTINUED | OUTPATIENT
Start: 2017-02-22 | End: 2017-02-23 | Stop reason: HOSPADM

## 2017-02-22 RX ORDER — FENTANYL CITRATE 50 UG/ML
50 INJECTION, SOLUTION INTRAMUSCULAR; INTRAVENOUS
Status: DISCONTINUED | OUTPATIENT
Start: 2017-02-22 | End: 2017-02-22 | Stop reason: HOSPADM

## 2017-02-22 RX ORDER — LORAZEPAM 1 MG/1
1 TABLET ORAL EVERY 12 HOURS PRN
Status: DISCONTINUED | OUTPATIENT
Start: 2017-02-22 | End: 2017-02-23 | Stop reason: HOSPADM

## 2017-02-22 RX ORDER — DEXAMETHASONE SODIUM PHOSPHATE 10 MG/ML
INJECTION, SOLUTION INTRAMUSCULAR; INTRAVENOUS AS NEEDED
Status: DISCONTINUED | OUTPATIENT
Start: 2017-02-22 | End: 2017-02-22 | Stop reason: SURG

## 2017-02-22 RX ORDER — BUPIVACAINE HCL/0.9 % NACL/PF 0.1 %
3 PLASTIC BAG, INJECTION (ML) EPIDURAL ONCE
Status: DISCONTINUED | OUTPATIENT
Start: 2017-02-22 | End: 2017-02-22 | Stop reason: HOSPADM

## 2017-02-22 RX ORDER — SODIUM CHLORIDE, SODIUM LACTATE, POTASSIUM CHLORIDE, CALCIUM CHLORIDE 600; 310; 30; 20 MG/100ML; MG/100ML; MG/100ML; MG/100ML
150 INJECTION, SOLUTION INTRAVENOUS CONTINUOUS
Status: DISCONTINUED | OUTPATIENT
Start: 2017-02-22 | End: 2017-02-23 | Stop reason: HOSPADM

## 2017-02-22 RX ORDER — ATRACURIUM BESYLATE 10 MG/ML
INJECTION, SOLUTION INTRAVENOUS AS NEEDED
Status: DISCONTINUED | OUTPATIENT
Start: 2017-02-22 | End: 2017-02-22 | Stop reason: SURG

## 2017-02-22 RX ORDER — FAMOTIDINE 10 MG/ML
20 INJECTION, SOLUTION INTRAVENOUS
Status: CANCELLED | OUTPATIENT
Start: 2017-02-22

## 2017-02-22 RX ORDER — PROPOFOL 10 MG/ML
VIAL (ML) INTRAVENOUS AS NEEDED
Status: DISCONTINUED | OUTPATIENT
Start: 2017-02-22 | End: 2017-02-22 | Stop reason: SURG

## 2017-02-22 RX ORDER — SODIUM CHLORIDE 0.9 % (FLUSH) 0.9 %
1-10 SYRINGE (ML) INJECTION AS NEEDED
Status: DISCONTINUED | OUTPATIENT
Start: 2017-02-22 | End: 2017-02-22 | Stop reason: HOSPADM

## 2017-02-22 RX ORDER — CHLORHEXIDINE GLUCONATE 0.12 MG/ML
15 RINSE ORAL ONCE
Status: COMPLETED | OUTPATIENT
Start: 2017-02-22 | End: 2017-02-22

## 2017-02-22 RX ORDER — CITALOPRAM 40 MG/1
40 TABLET ORAL DAILY
Status: DISCONTINUED | OUTPATIENT
Start: 2017-02-23 | End: 2017-02-23 | Stop reason: HOSPADM

## 2017-02-22 RX ORDER — LIDOCAINE HYDROCHLORIDE 20 MG/ML
INJECTION, SOLUTION INFILTRATION; PERINEURAL AS NEEDED
Status: DISCONTINUED | OUTPATIENT
Start: 2017-02-22 | End: 2017-02-22 | Stop reason: SURG

## 2017-02-22 RX ORDER — SODIUM CHLORIDE AND POTASSIUM CHLORIDE 150; 450 MG/100ML; MG/100ML
125 INJECTION, SOLUTION INTRAVENOUS CONTINUOUS
Status: DISCONTINUED | OUTPATIENT
Start: 2017-02-23 | End: 2017-02-23 | Stop reason: HOSPADM

## 2017-02-22 RX ORDER — LIDOCAINE HYDROCHLORIDE 10 MG/ML
0.5 INJECTION, SOLUTION EPIDURAL; INFILTRATION; INTRACAUDAL; PERINEURAL ONCE AS NEEDED
Status: COMPLETED | OUTPATIENT
Start: 2017-02-22 | End: 2017-02-22

## 2017-02-22 RX ORDER — MORPHINE SULFATE 2 MG/ML
6 INJECTION, SOLUTION INTRAMUSCULAR; INTRAVENOUS
Status: DISCONTINUED | OUTPATIENT
Start: 2017-02-22 | End: 2017-02-23 | Stop reason: HOSPADM

## 2017-02-22 RX ORDER — SIMETHICONE 80 MG
80 TABLET,CHEWABLE ORAL 4 TIMES DAILY PRN
Status: DISCONTINUED | OUTPATIENT
Start: 2017-02-22 | End: 2017-02-23 | Stop reason: HOSPADM

## 2017-02-22 RX ORDER — PANTOPRAZOLE SODIUM 40 MG/10ML
40 INJECTION, POWDER, LYOPHILIZED, FOR SOLUTION INTRAVENOUS
Status: DISCONTINUED | OUTPATIENT
Start: 2017-02-23 | End: 2017-02-23 | Stop reason: HOSPADM

## 2017-02-22 RX ORDER — HYDROCODONE BITARTRATE AND ACETAMINOPHEN 7.5; 325 MG/1; MG/1
1 TABLET ORAL EVERY 4 HOURS PRN
Status: DISCONTINUED | OUTPATIENT
Start: 2017-02-22 | End: 2017-02-23 | Stop reason: HOSPADM

## 2017-02-22 RX ADMIN — Medication 3 G: at 12:45

## 2017-02-22 RX ADMIN — MORPHINE SULFATE 4 MG: 4 INJECTION, SOLUTION INTRAMUSCULAR; INTRAVENOUS at 21:07

## 2017-02-22 RX ADMIN — BUPIVACAINE HYDROCHLORIDE 30 ML: 2.5 INJECTION, SOLUTION EPIDURAL; INFILTRATION; INTRACAUDAL; PERINEURAL at 14:08

## 2017-02-22 RX ADMIN — SODIUM CHLORIDE, POTASSIUM CHLORIDE, SODIUM LACTATE AND CALCIUM CHLORIDE 150 ML/HR: 600; 310; 30; 20 INJECTION, SOLUTION INTRAVENOUS at 13:20

## 2017-02-22 RX ADMIN — ROBINUL 0.4 MG: 0.2 INJECTION INTRAMUSCULAR; INTRAVENOUS at 15:20

## 2017-02-22 RX ADMIN — CHLORHEXIDINE GLUCONATE 15 ML: 1.2 RINSE ORAL at 12:41

## 2017-02-22 RX ADMIN — LIDOCAINE HYDROCHLORIDE 30 MG: 20 INJECTION, SOLUTION INFILTRATION; PERINEURAL at 14:03

## 2017-02-22 RX ADMIN — ROCURONIUM BROMIDE 50 MG: 10 INJECTION INTRAVENOUS at 14:04

## 2017-02-22 RX ADMIN — NEOSTIGMINE METHYLSULFATE 3 MG: 1 INJECTION, SOLUTION INTRAVENOUS at 15:20

## 2017-02-22 RX ADMIN — ROBINUL 0.1 MG: 0.2 INJECTION INTRAMUSCULAR; INTRAVENOUS at 14:33

## 2017-02-22 RX ADMIN — SCOPALAMINE 1 PATCH: 1 PATCH, EXTENDED RELEASE TRANSDERMAL at 12:40

## 2017-02-22 RX ADMIN — LIDOCAINE HYDROCHLORIDE 0.5 ML: 10 INJECTION, SOLUTION EPIDURAL; INFILTRATION; INTRACAUDAL; PERINEURAL at 12:41

## 2017-02-22 RX ADMIN — SODIUM CHLORIDE, POTASSIUM CHLORIDE, SODIUM LACTATE AND CALCIUM CHLORIDE 150 ML/HR: 600; 310; 30; 20 INJECTION, SOLUTION INTRAVENOUS at 22:59

## 2017-02-22 RX ADMIN — BUPRENORPHINE HYDROCHLORIDE 150 MCG: 0.3 INJECTION INTRAMUSCULAR; INTRAVENOUS at 14:06

## 2017-02-22 RX ADMIN — SODIUM CHLORIDE, POTASSIUM CHLORIDE, SODIUM LACTATE AND CALCIUM CHLORIDE 1000 ML: 600; 310; 30; 20 INJECTION, SOLUTION INTRAVENOUS at 12:41

## 2017-02-22 RX ADMIN — DEXAMETHASONE SODIUM PHOSPHATE 2 MG: 10 INJECTION, SOLUTION INTRAMUSCULAR; INTRAVENOUS at 14:08

## 2017-02-22 RX ADMIN — ENOXAPARIN SODIUM 40 MG: 40 INJECTION SUBCUTANEOUS at 12:15

## 2017-02-22 RX ADMIN — ATRACURIUM BESYLATE 10 MG: 10 INJECTION, SOLUTION INTRAVENOUS at 14:45

## 2017-02-22 RX ADMIN — BUPIVACAINE HYDROCHLORIDE 30 ML: 2.5 INJECTION, SOLUTION EPIDURAL; INFILTRATION; INTRACAUDAL; PERINEURAL at 14:06

## 2017-02-22 RX ADMIN — PROPOFOL 200 MG: 10 INJECTION, EMULSION INTRAVENOUS at 14:04

## 2017-02-22 RX ADMIN — DEXAMETHASONE SODIUM PHOSPHATE 2 MG: 10 INJECTION, SOLUTION INTRAMUSCULAR; INTRAVENOUS at 14:06

## 2017-02-22 RX ADMIN — ACETAMINOPHEN 1000 MG: 10 INJECTION, SOLUTION INTRAVENOUS at 20:32

## 2017-02-22 RX ADMIN — CHLORHEXIDINE GLUCONATE 15 ML: 1.2 RINSE ORAL at 12:30

## 2017-02-22 RX ADMIN — BUPRENORPHINE HYDROCHLORIDE 150 MCG: 0.3 INJECTION INTRAMUSCULAR; INTRAVENOUS at 14:08

## 2017-02-22 RX ADMIN — PANTOPRAZOLE SODIUM 40 MG: 40 INJECTION, POWDER, FOR SOLUTION INTRAVENOUS at 12:41

## 2017-02-22 RX ADMIN — Medication 3 G: at 21:02

## 2017-02-22 RX ADMIN — FENTANYL CITRATE 50 MCG: 50 INJECTION, SOLUTION INTRAMUSCULAR; INTRAVENOUS at 16:53

## 2017-02-22 RX ADMIN — PRAVASTATIN SODIUM 20 MG: 20 TABLET ORAL at 20:32

## 2017-02-22 RX ADMIN — ACETAMINOPHEN 1000 MG: 10 INJECTION, SOLUTION INTRAVENOUS at 14:37

## 2017-02-22 RX ADMIN — EPHEDRINE SULFATE 12.5 MG: 50 INJECTION INTRAMUSCULAR; INTRAVENOUS; SUBCUTANEOUS at 14:15

## 2017-02-22 RX ADMIN — SODIUM CHLORIDE, POTASSIUM CHLORIDE, SODIUM LACTATE AND CALCIUM CHLORIDE 150 ML/HR: 600; 310; 30; 20 INJECTION, SOLUTION INTRAVENOUS at 17:58

## 2017-02-22 NOTE — ANESTHESIA POSTPROCEDURE EVALUATION
Patient: Liliana Quevedo    Procedure Summary     Date Anesthesia Start Anesthesia Stop Room / Location    02/22/17 1400 1545  [facility] / [facility]       Procedure Diagnosis Surgeon Provider    GASTRIC SLEEVE LAPAROSCOPIC (N/A Abdomen) Obesity, Class III, BMI 40-49.9 (morbid obesity)  (Obesity, Class III, BMI 40-49.9 (morbid obesity) [E66.01]) MD Carlos Locke MD          Anesthesia Type: general, regional  Last vitals  /67 (02/22/17 1540)    Temp 97.7 °F (36.5 °C) (02/22/17 1540)    Pulse 61 (02/22/17 1540)   Resp 16 (02/22/17 1540)    SpO2 99 % (02/22/17 1540)      Post Anesthesia Care and Evaluation    Patient location during evaluation: PACU  Patient participation: complete - patient participated  Level of consciousness: awake and alert  Pain score: 0  Pain management: adequate  Airway patency: patent  Anesthetic complications: No anesthetic complications  PONV Status: none  Cardiovascular status: acceptable  Respiratory status: acceptable  Hydration status: acceptable

## 2017-02-22 NOTE — ANESTHESIA PROCEDURE NOTES
"Peripheral Block    Patient location during procedure: pre-op  Start time: 2/22/2017 2:05 PM  Stop time: 2/22/2017 2:10 PM  Reason for block: at surgeon's request and post-op pain management  Performed by  Anesthesiologist: KELLY VARGAS  Preanesthetic Checklist  Completed: patient identified, site marked, surgical consent, pre-op evaluation, timeout performed, IV checked, risks and benefits discussed and monitors and equipment checked  Peripheral Block Prep:  Sterile barriers:cap, gloves, sterile barriers and mask  Prep: ChloraPrep  Patient monitoring: blood pressure monitoring, continuous pulse oximetry and EKG  Peripheral Procedure  Nursing cardiac assessment comments yes: Sedation, GA, Spinal,Epidural   Guidance:ultrasound guided  Images:still images obtained  Laterality:BilateralBlock Type:TAP  Injection Technique:single-shotNeedle Type:short-bevel  Needle Gauge:20 G  Needle gauge: 20g 4\" Stimuplex.   Medications  Comment:Block Injection:  LA dose divided between Right and Left block       Adjuncts:  Decadron 4mg PSF, Buprenex 0.3mg (Per total volume of LA)  Local Injected:bupivacaine 0.25% without epinephrine Local Amount Injected:60mL  Post Assessment  Patient Tolerance:comfortable throughout block  Complications:no  Additional Notes  The pt was placed in the Supine Position and was  anesthetized with: GA    Under Ultrasound guidance, a BBraun 4inch 360 degree needle was advanced with Normal Saline hydro dissection of tissue.  The Internal Oblique and Transversus Abdominus muscles where visualized.  At or before the aponeurosis of Internal Oblique, local anesthetic spread was visualized in the Transversus Abdominus Plane. Injection was made incrementally with aspiration every 5 mls.  There was no  intravascular injection,  injection pressure was normal, there was no neural injection, and the procedure was completed without difficulty.  Thank You.            "

## 2017-02-22 NOTE — H&P (VIEW-ONLY)
"[facility] BARIATRIC SURGERY  [address]  Formerly [facility] [id number] [phone]      Patient  Name:  Liliana Quevedo.  :  1965      Chief Complaint:  weight gain; unable to maintain weight loss. Preop LSG    History of Present Illness:    Liliana Quevedo is a 51 y.o. female who presents today for evaluation, education and consultation regarding weight loss surgery. The patient is interested in sleeve gastrectomy     • Length of time being obese: was six  years.  • Amount of time 100 lbs overweight: was four  years  Most weight lost was 65 lbs with weight watchers, but only able to maintain x 1 year.        Past Medical History   Diagnosis Date   • Allergic rhinitis    • Arthritis    • Carpal tunnel syndrome    • Colon polyp      SIGMOID   • Depression    • Dyspnea on exertion    • Elevated alkaline phosphatase level      102   • Fatigue    • Gastric ulcer    • GERD (gastroesophageal reflux disease)      on Protonix.  Says sx's not bad even if misses meds, \"depends on what I eat\".  EGD GDW  40 cm, path DE reflux.  serum h. pyl neg   • Hematuria    • Hyperlipidemia    • Hypertension      per prim MD note   • Hypothyroidism      w/ hx thyromegaly s/p CASTILLO.    • Incomplete right bundle branch block    • Low back pain    • Mild cardiomegaly    • Morbid obesity    • Nephrolithiasis    • Osteoporosis    • Peripheral edema    • Sleep apnea      CPAP compliant   • Stroke      STROKE SYNDROME   • Tendinitis    • Vitamin D deficiency      Past Surgical History   Procedure Laterality Date   • Ankle surgery Right      dislocation repair w/ plate    • Nose surgery     • Hysterectomy Bilateral      (open) w/ oophorectomy - uterine fibroids and ovarian cysts   • Colonoscopy       screening   • Laparoscopic cholecystectomy       for stones   • Knee surgery Left       ligament repair   • Carpal tunnel release       Allergies   Allergen Reactions   • Ampicillin Hives   • " Sulfa Antibiotics Nausea And Vomiting       Current Outpatient Prescriptions:   •  aspirin 81 MG tablet, Take  by mouth daily., Disp: , Rfl:   •  citalopram (CeleXA) 40 MG tablet, TAKE ONE TABLET BY MOUTH DAILY, Disp: 90 tablet, Rfl: 0  •  furosemide (LASIX) 40 MG tablet, TAKE ONE TABLET BY MOUTH DAILY AS NEEDED **MUST CALL MD FOR APPOINTMENT, Disp: 30 tablet, Rfl: 3  •  KLOR-CON 20 MEQ CR tablet, TAKE ONE TABLET BY MOUTH DAILY, Disp: 30 tablet, Rfl: 2  •  levothyroxine (SYNTHROID, LEVOTHROID) 150 MCG tablet, Take  by mouth daily., Disp: , Rfl:   •  omega-3 acid ethyl esters (LOVAZA) 1 G capsule, Take  by mouth daily., Disp: , Rfl:   •  pantoprazole (PROTONIX) 40 MG EC tablet, TAKE ONE TABLET BY MOUTH DAILY, Disp: 30 tablet, Rfl: 2  •  pravastatin (PRAVACHOL) 20 MG tablet, Take  by mouth daily., Disp: , Rfl:   Social History     Social History   • Marital status:      Spouse name: N/A   • Number of children: N/A   • Years of education: N/A     Occupational History   • Not on file.     Social History Main Topics   • Smoking status: Never Smoker   • Smokeless tobacco: Never Used   • Alcohol use No   • Drug use: No   • Sexual activity: Not on file     Other Topics Concern   • Not on file     Social History Narrative     Family History   Problem Relation Age of Onset   • Cancer Other      COLON CANCER   • Arthritis Other    • Sleep apnea Other    • Ovarian cancer Mother 58   • Obesity Mother    • Diabetes Mother    • Hypertension Mother    • Cancer Mother    • Breast cancer Maternal Aunt      60's   • Obesity Father    • Obesity Sister    • Obesity Brother    • Diabetes Brother    • Hypertension Brother    • Obesity Maternal Grandmother    • Diabetes Maternal Grandmother    • Hypertension Maternal Grandmother    • Heart attack Maternal Grandmother    • Sleep apnea Maternal Grandmother    • Cancer Maternal Grandmother        Review Of Systems   Systemic: Feeling tired (fatigue).  No fever and no chills.  Recent  weight gain.  Breasts: No mammogram concerns, no uterine cancer, and no ovarian cancer.  Patient has had 2 pregnancies previously.  Cardiovascular: No hypertension, no angina, and no ischemic heart disease.  Edema.  No deep venous thrombosis.  Pulmonary: No asthma.  Uses CPAP / BiPAP Suffers from sleep apnea.  No pulmonary embolism.  Dyspnea during exertion.  Gastrointestinal: No dysphagia.  Reports heartburn.  No liver disease, no irritable bowel, and no pancreatic disease.  Genitourinary: Urinary loss of control.  No kidney stones, no kidney failure, and no renal insuff.  Endocrine: No gout and no diabetes.  Dyslipidemia and hypothyroidism.  No abnormal glucose levels.  Hematologic: No bleeding disorder and no prior blood transfusions.  Uses of blood thinners.  No anemia.  Musculoskeletal: No fibromyalgia.  Osteoarthritis, neck pain, wrist joint pain, shoulder joint pain, back pain, hip joint pain, knee joint pain, and ankle joint pain.  Neurological: No migraines, no seizures, and no strokes.  Psychological: No anxiety.  Depression.  No bipolar disorder.  Skin: No history of MRSA skin infections.   All other systems reviewed/negative.    Physical Exam:  Vital Signs:  Weight: 264 lb 8 oz (120 kg)   Body mass index is 46.12 kg/(m^2).  Temp: 97.4 °F (36.3 °C)   Heart Rate: 57   BP: 110/72     Physical Exam   Constitutional: She is oriented to person, place, and time. She appears well-developed and well-nourished.   HENT:   Head: Normocephalic and atraumatic.   Eyes: Conjunctivae are normal. No scleral icterus.   Neck: Neck supple. Carotid bruit is not present. No thyromegaly present.   Cardiovascular: Normal rate and regular rhythm.    No murmur heard.  I do not apprec her murmur on today's exam   Pulmonary/Chest: Effort normal and breath sounds normal. No respiratory distress. She has no wheezes. She has no rales.   Abdominal: Soft. Bowel sounds are normal. She exhibits no distension and no mass. There is no  hepatosplenomegaly. There is no tenderness. No hernia.       Scars:  present; lap billy, pfannenstiel   Musculoskeletal: Normal range of motion. She exhibits no edema.   Neurological: She is alert and oriented to person, place, and time. Gait normal.   Skin: Skin is warm and dry. No rash noted.   Psychiatric: She has a normal mood and affect. Judgment normal.   Vitals reviewed.         Patient Active Problem List   Diagnosis   • Atopic rhinitis   • Anxiety   • Arthralgia of multiple joints   • Knee pain   • Radial styloid tenosynovitis   • Depression   • Edema   • Gastroesophageal reflux disease   • Hyperlipidemia   • Hypothyroidism   • Low back pain   • Adiposity   • Obstructive sleep apnea syndrome   • Onychomycosis of toenail   • Calculus of kidney   • Vitamin D deficiency   • Obesity due to excess calories   • GERD (gastroesophageal reflux disease)   • Sleep apnea   • Morbid obesity   • Fatigue   • Dyspnea on exertion   • Peripheral edema     Psych 6/16 Vahid carrera    Assessment:    Liliana Quevedo is a 51 y.o. year old female with medically complicated obesity pursuing sleeve gastrectomy.    Weight loss surgery is deemed medically necessary given the following obesity related comorbidities including sleep apnea, dyslipidemia, GERD and depression with current Weight: 264 lb 8 oz (120 kg) and Body mass index is 46.12 kg/(m^2)..    Patient is aware that surgery is a tool, and that weight loss is not guaranteed but only seen in the context of appropriate use, follow up and exercise.    Complications  of laparoscopic/possible robotic gastric sleeve were discussed. The patient is well aware of the potential complications of surgery that include but not limited to bleeding, infections, deep venous thrombosis, pulmonary embolism, pulmonary complications such as pneumonia, cardiac events, hernias, small bowel obstruction, damage to the spleen or other organs, bowel injury, disfiguring scars, failure to lose  "weight, need for additional surgery, conversion to an open procedure, and death. Patient is also aware of complications which apply in this particular procedure that can include but are not limited to a \"leak\" at the staple line which in some instances may require conversion to gastric bypass.    Greater than 10 minutes was spent with the patient discussing avoiding all tobacco products and second hand smoke at least 2 weeks pre-operatively and 6 weeks post-operatively to minimize the risk of sleeve leak.  Says kristie smokes outside and in his car only, not hers, will not ride in his car for the requisite period.    Discussed the risks, benefits and alternative therapies at great length as outlined in our extensive consent forms, consent videos, and educational teaching process under the direction of the center's .    A copy of the patient's signed informed consent is on file.      Plan:  The patient has been advised that a letter of medical support must be obtained from her primary care physician or referring provider. A psychological evaluation will be performed for this patient as well. Preoperative testing will include: CBC, CMP, Fasting Lipids, TSH, H.Pylori, CXR, EKG and EGD     Preop Cardiac clearance will be required prior to surgery.      The patient was advised to start a high protein and low carbohydrate diet.  The patient was given individualized information by our dietician along with general group information and instructions.     Information on VLADIMIR educational video was given to the patient.  This is an internet based educational video that explains the surgical procedure chosen and answers basic questions regarding that procedure.     Lastly, the consultation plan was reviewed with the patient.      Sergio Gibbons MD                                                   "

## 2017-02-22 NOTE — BRIEF OP NOTE
GASTRIC SLEEVE LAPAROSCOPIC  Procedure Note    Liliana Quevedo  2/22/2017    Pre-op Diagnosis:   Obesity, Class III, BMI 40-49.9 (morbid obesity) [E66.01]    Post-op Diagnosis:     Post-Op Diagnosis Codes:     * Obesity, Class III, BMI 40-49.9 (morbid obesity) [E66.01]    Procedure/CPT® Codes:  HI LAP, LINDA RESTRICT PROC, LONGITUDINAL GASTRECTOMY [77300]  HI ESOPHAGOGASTRODUODENOSCOPY TRANSORAL DIAGNOSTIC [80621]    Procedure(s):  GASTRIC SLEEVE LAPAROSCOPIC    Surgeon(s):  Sergio Gibbons MD    Anesthesia: General with Block    Staff:   Circulator: Ashlie BARROSO RN  Scrub Person: Maki Shields  Nursing Assistant: Oscar Tim    Estimated Blood Loss: * No values recorded between 2/22/2017  2:00 PM and 2/22/2017  2:05 PM *    Specimens:                * No specimens in log *      Drains:           Findings:     Complications: none      Sergio Gibbons MD     Date: 2/22/2017  Time: 2:05 PM

## 2017-02-22 NOTE — ANESTHESIA PROCEDURE NOTES
"Peripheral Block    Patient location during procedure: OR  Reason for block: at surgeon's request and post-op pain management  Performed by  Anesthesiologist: KELLY VARGAS  Preanesthetic Checklist  Completed: patient identified, site marked, surgical consent, pre-op evaluation, timeout performed, IV checked, risks and benefits discussed and monitors and equipment checked  Peripheral Block Prep:  Sterile barriers:cap, gloves, sterile barriers and mask  Prep: ChloraPrep  Patient monitoring: blood pressure monitoring, continuous pulse oximetry and EKG  Peripheral Procedure  Nursing cardiac assessment comments yes: Sedation, GA, Spinal,Epidural   Guidance:ultrasound guided  Images:still images obtained  Laterality:BilateralBlock Type:TAP  Injection Technique:single-shotNeedle Type:short-bevel  Needle Gauge:20 G  Needle gauge: 20g 4\" Stimuplex.   Medications  Comment:Block Injection:  LA dose divided between Right and Left block       Adjuncts:  Decadron 4mg PSF, Buprenex 0.3mg (Per total volume of LA)  Local Injected:bupivacaine 0.25% without epinephrine Local Amount Injected:60mL  Post Assessment  Patient Tolerance:comfortable throughout block  Complications:no  Additional Notes  The pt was placed in the Supine Position and was  anesthetized with general anesthesia.     Under Ultrasound guidance, a BBraun 4inch 360 degree needle was advanced with Normal Saline hydro dissection of tissue.  The Internal Oblique and Transversus Abdominus muscles where visualized.  At or before the aponeurosis of Internal Oblique, local anesthetic spread was visualized in the Transversus Abdominus Plane. Injection was made incrementally with aspiration every 5 mls.  There was no  intravascular injection,  injection pressure was normal, there was no neural injection, and the procedure was completed without difficulty.  Thank You.            "

## 2017-02-22 NOTE — PLAN OF CARE
Problem: Perioperative Period (Adult)  Goal: Signs and Symptoms of Listed Potential Problems Will be Absent or Manageable (Perioperative Period)  Outcome: Ongoing (interventions implemented as appropriate)    02/22/17 2352   Perioperative Period   Problems Assessed (Perioperative Period) all   Problems Present (Perioperative Period) none

## 2017-02-22 NOTE — ANESTHESIA PROCEDURE NOTES
Airway  Urgency: elective    Airway not difficult    General Information and Staff    Patient location during procedure: OR    Indications and Patient Condition  Indications for airway management: airway protection    Preoxygenated: yes  Mask difficulty assessment: 1 - vent by mask    Final Airway Details  Final airway type: endotracheal airway      Successful airway: ETT  Cuffed: yes   Successful intubation technique: direct laryngoscopy  Facilitating devices/methods: intubating stylet  Endotracheal tube insertion site: oral  Blade: Grimm  Blade size: #2  ETT size: 7.0 mm  Cormack-Lehane Classification: grade I - full view of glottis  Placement verified by: chest auscultation and capnometry   Measured from: lips  ETT to lips (cm): 21  Number of attempts at approach: 1

## 2017-02-22 NOTE — ANESTHESIA PREPROCEDURE EVALUATION
Anesthesia Evaluation     Patient summary reviewed and Nursing notes reviewed   NPO Status: > 8 hours   Airway   Mallampati: II  TM distance: >3 FB  Neck ROM: full  no difficulty expected  Dental    (+) poor dentation    Pulmonary    (+) shortness of breath, sleep apnea on CPAP, decreased breath sounds,   Cardiovascular - normal exam    Rhythm: regular  Rate: normal    (+) hypertension, dysrhythmias,       Neuro/Psych  GI/Hepatic/Renal/Endo    (+) morbid obesity, GERD well controlled, chronic renal disease CRI, hypothyroidism,   (-)  obesity    Musculoskeletal     Abdominal   (+) obese,     Abdomen: soft.   Substance History      OB/GYN          Other                                    Anesthesia Plan    ASA 3     general and regional     intravenous induction   Anesthetic plan and risks discussed with patient.    Plan discussed with CRNA.

## 2017-02-23 ENCOUNTER — APPOINTMENT (OUTPATIENT)
Dept: GENERAL RADIOLOGY | Facility: HOSPITAL | Age: 52
End: 2017-02-23
Attending: SURGERY

## 2017-02-23 VITALS
DIASTOLIC BLOOD PRESSURE: 63 MMHG | TEMPERATURE: 98.5 F | SYSTOLIC BLOOD PRESSURE: 102 MMHG | OXYGEN SATURATION: 92 % | WEIGHT: 264.55 LBS | RESPIRATION RATE: 18 BRPM | HEART RATE: 59 BPM | BODY MASS INDEX: 45.17 KG/M2 | HEIGHT: 64 IN

## 2017-02-23 LAB
ALBUMIN SERPL-MCNC: 4 G/DL (ref 3.2–4.8)
ALBUMIN/GLOB SERPL: 1.5 G/DL (ref 1.5–2.5)
ALP SERPL-CCNC: 79 U/L (ref 25–100)
ALT SERPL W P-5'-P-CCNC: 31 U/L (ref 7–40)
ANION GAP SERPL CALCULATED.3IONS-SCNC: 3 MMOL/L (ref 3–11)
AST SERPL-CCNC: 26 U/L (ref 0–33)
BASOPHILS # BLD AUTO: 0 10*3/MM3 (ref 0–0.2)
BASOPHILS NFR BLD AUTO: 0 % (ref 0–1)
BILIRUB SERPL-MCNC: 0.4 MG/DL (ref 0.3–1.2)
BUN BLD-MCNC: 12 MG/DL (ref 9–23)
BUN/CREAT SERPL: 17.1 (ref 7–25)
CALCIUM SPEC-SCNC: 9.4 MG/DL (ref 8.7–10.4)
CHLORIDE SERPL-SCNC: 102 MMOL/L (ref 99–109)
CO2 SERPL-SCNC: 31 MMOL/L (ref 20–31)
CREAT BLD-MCNC: 0.7 MG/DL (ref 0.6–1.3)
DEPRECATED RDW RBC AUTO: 43 FL (ref 37–54)
EOSINOPHIL # BLD AUTO: 0 10*3/MM3 (ref 0.1–0.3)
EOSINOPHIL NFR BLD AUTO: 0 % (ref 0–3)
ERYTHROCYTE [DISTWIDTH] IN BLOOD BY AUTOMATED COUNT: 13.7 % (ref 11.3–14.5)
GFR SERPL CREATININE-BSD FRML MDRD: 88 ML/MIN/1.73
GLOBULIN UR ELPH-MCNC: 2.6 GM/DL
GLUCOSE BLD-MCNC: 120 MG/DL (ref 70–100)
HCT VFR BLD AUTO: 38.4 % (ref 34.5–44)
HGB BLD-MCNC: 12.9 G/DL (ref 11.5–15.5)
IMM GRANULOCYTES # BLD: 0.01 10*3/MM3 (ref 0–0.03)
IMM GRANULOCYTES NFR BLD: 0.1 % (ref 0–0.6)
IRON 24H UR-MRATE: 41 MCG/DL (ref 50–175)
LYMPHOCYTES # BLD AUTO: 0.76 10*3/MM3 (ref 0.6–4.8)
LYMPHOCYTES NFR BLD AUTO: 9.6 % (ref 24–44)
MCH RBC QN AUTO: 28.7 PG (ref 27–31)
MCHC RBC AUTO-ENTMCNC: 33.6 G/DL (ref 32–36)
MCV RBC AUTO: 85.5 FL (ref 80–99)
MONOCYTES # BLD AUTO: 0.36 10*3/MM3 (ref 0–1)
MONOCYTES NFR BLD AUTO: 4.6 % (ref 0–12)
NEUTROPHILS # BLD AUTO: 6.78 10*3/MM3 (ref 1.5–8.3)
NEUTROPHILS NFR BLD AUTO: 85.7 % (ref 41–71)
PLATELET # BLD AUTO: 203 10*3/MM3 (ref 150–450)
PMV BLD AUTO: 9.8 FL (ref 6–12)
POTASSIUM BLD-SCNC: 4.5 MMOL/L (ref 3.5–5.5)
PROT SERPL-MCNC: 6.6 G/DL (ref 5.7–8.2)
RBC # BLD AUTO: 4.49 10*6/MM3 (ref 3.89–5.14)
SODIUM BLD-SCNC: 136 MMOL/L (ref 132–146)
WBC NRBC COR # BLD: 7.91 10*3/MM3 (ref 3.5–10.8)

## 2017-02-23 PROCEDURE — 85025 COMPLETE CBC W/AUTO DIFF WBC: CPT | Performed by: SURGERY

## 2017-02-23 PROCEDURE — 80053 COMPREHEN METABOLIC PANEL: CPT | Performed by: SURGERY

## 2017-02-23 PROCEDURE — 74241: CPT

## 2017-02-23 PROCEDURE — 94799 UNLISTED PULMONARY SVC/PX: CPT

## 2017-02-23 PROCEDURE — 25810000003 POTASSIUM CHLORIDE PER 2 MEQ: Performed by: SURGERY

## 2017-02-23 PROCEDURE — 25010000002 PYRIDOXINE PER 100 MG: Performed by: SURGERY

## 2017-02-23 PROCEDURE — 83540 ASSAY OF IRON: CPT | Performed by: SURGERY

## 2017-02-23 PROCEDURE — 25510000001 DIATRIZOATE MEGLUMINE & SODIUM PER 1 ML: Performed by: SURGERY

## 2017-02-23 PROCEDURE — 25010000002 ENOXAPARIN PER 10 MG: Performed by: SURGERY

## 2017-02-23 PROCEDURE — 99024 POSTOP FOLLOW-UP VISIT: CPT | Performed by: SURGERY

## 2017-02-23 PROCEDURE — 25010000002 CYANOCOBALAMIN PER 1000 MCG: Performed by: SURGERY

## 2017-02-23 PROCEDURE — 25010000002 THIAMINE PER 100 MG: Performed by: SURGERY

## 2017-02-23 PROCEDURE — 25010000002 NA FERRIC GLUC CPLX PER 12.5 MG: Performed by: SURGERY

## 2017-02-23 RX ORDER — PROMETHAZINE HYDROCHLORIDE 12.5 MG/1
12.5 TABLET ORAL EVERY 6 HOURS PRN
Qty: 20 TABLET | Refills: 0 | Status: SHIPPED | OUTPATIENT
Start: 2017-02-23 | End: 2017-03-22

## 2017-02-23 RX ORDER — HYDROCODONE BITARTRATE AND ACETAMINOPHEN 7.5; 325 MG/1; MG/1
1 TABLET ORAL EVERY 6 HOURS PRN
Qty: 30 TABLET | Refills: 0 | Status: SHIPPED | OUTPATIENT
Start: 2017-02-23 | End: 2017-03-22

## 2017-02-23 RX ADMIN — PANTOPRAZOLE SODIUM 40 MG: 40 INJECTION, POWDER, FOR SOLUTION INTRAVENOUS at 09:00

## 2017-02-23 RX ADMIN — DIATRIZOATE MEGLUMINE AND DIATRIZOATE SODIUM 90 ML: 660; 100 LIQUID ORAL; RECTAL at 09:35

## 2017-02-23 RX ADMIN — ACETAMINOPHEN 1000 MG: 10 INJECTION, SOLUTION INTRAVENOUS at 01:39

## 2017-02-23 RX ADMIN — ENOXAPARIN SODIUM 40 MG: 40 INJECTION SUBCUTANEOUS at 08:30

## 2017-02-23 RX ADMIN — HYDROMORPHONE HYDROCHLORIDE 2 MG: 2 TABLET ORAL at 12:53

## 2017-02-23 RX ADMIN — SODIUM CHLORIDE 125 MG: 9 INJECTION, SOLUTION INTRAVENOUS at 09:58

## 2017-02-23 RX ADMIN — ACETAMINOPHEN 1000 MG: 10 INJECTION, SOLUTION INTRAVENOUS at 08:29

## 2017-02-23 RX ADMIN — HYDROMORPHONE HYDROCHLORIDE 2 MG: 2 TABLET ORAL at 08:40

## 2017-02-23 RX ADMIN — THIAMINE HYDROCHLORIDE 250 ML/HR: 100 INJECTION, SOLUTION INTRAMUSCULAR; INTRAVENOUS at 08:29

## 2017-02-23 RX ADMIN — CYANOCOBALAMIN 1000 MCG: 1000 INJECTION, SOLUTION INTRAMUSCULAR; SUBCUTANEOUS at 08:30

## 2017-02-23 RX ADMIN — SIMETHICONE CHEW TAB 80 MG 80 MG: 80 TABLET ORAL at 01:48

## 2017-02-23 RX ADMIN — Medication 3 G: at 04:08

## 2017-02-23 RX ADMIN — CITALOPRAM HYDROBROMIDE 40 MG: 40 TABLET ORAL at 08:30

## 2017-02-23 RX ADMIN — LEVOTHYROXINE SODIUM 150 MCG: 150 TABLET ORAL at 06:03

## 2017-02-23 RX ADMIN — POTASSIUM CHLORIDE AND SODIUM CHLORIDE 125 ML/HR: 450; 150 INJECTION, SOLUTION INTRAVENOUS at 15:08

## 2017-02-23 NOTE — OP NOTE
DATE OF PROCEDURE:  02/22/2017    PREOPERATIVE DIAGNOSIS: Morbid obesity with multiple comorbidities.     POSTOPERATIVE DIAGNOSIS: Morbid obesity with multiple comorbidities.     PROCEDURES PERFORMED:  1. Laparoscopic sleeve gastrectomy (85% subtotal vertical gastrectomy) over a 36-Montserratian bougie dilator.   2. Esophagogastroduodenoscopy.     SURGEON: Sergio Gibbons MD     ANESTHESIA: General endotracheal.     ESTIMATED BLOOD LOSS: 50 mL.     FLUIDS: Crystalloid.     SPECIMENS: Subtotal gastrectomy.     DRAINS: None.     COUNTS: Correct.     COMPLICATIONS: None.     INDICATIONS: This is a 51-year-old morbidly obese white female who presents for elective laparoscopic sleeve gastrectomy. She has undergone our extensive preoperative education, teaching, and consent process. Everything is in order and she wishes to proceed.     DESCRIPTION OF PROCEDURE: The patient was brought to the operating room and placed supine upon the operating room table. SCD hoses were placed. She underwent uneventful general endotracheal anesthesia per the anesthesiology staff. She received IV Ancef with her ampicillin allergy was noted and subcutaneous Lovenox. The anesthesiology staff performed a TAP block and her abdomen was prepped and draped with ChloraPrep in a sterile fashion. An Ioban was used as well. Peck catheter was not placed. The peritoneal cavity was entered in the upper abdomen to the left of midline using a 5 mm trocar utilizing an Optiview technique and the abdomen was insufflated to a pressure of 15 mmHg with CO2 gas. Exploratory laparoscopy revealed no evidence of injury from the entrance technique and normal-appearing liver. The patient was noted to be status post cholecystectomy. No visible adhesions. No other abnormalities were noted. The remaining trocars were placed under direct visualization including two additional 5 mm trocars in the left, one on the right, and to the right of the umbilicus in a skin fold off  the midline a 15 mm trocar was placed. Through a stab incision in the epigastrium, a Gadiel retractor was used to elevate the left lobe of the liver exposing the hiatus. There was no visible hiatal hernia from the anterior view and this was photo documented. Beginning approximately two thirds of the way around the greater curvature of the stomach, the gastrocolic vessels were divided with the Harmonic scalpel. This proceeded proximally taking down all the short gastric vessels and exposing the left gopal along its length. There were no posterior hernias or lipomas. This was photo documented. Gastrocolic vessels were then divided medially to 5 cm proximal to the pylorus. The lesser sac was completely obliterated with a thick filmy adhesion and this was taken down completely with the Harmonic scalpel. The anesthesiology staff passed a 36-Greenlandic blunt tip bougie dilator which was manipulated along the lesser curvature into the distal antrum. The 85% subtotal vertical sleeve gastrectomy was then performed over the 36-Greenlandic bougie dilator using an Follett 60 mm articulating electric GST stapler. The first firing was a black load. The next 4 firings were green loads. All included a single absorbable Veritas Cece-Strip. After clamping down the final green load and prior to firing it, the bougie dilator was removed. The sleeve was performed such that it was uniform in size, no hourglassing or narrowing, especially at the angularis, and the final firing was done a centimeter away from the angle of His to hopefully avoid incorporating esophageal fibers. The subtotal gastrectomy specimen was placed into a large retrieval bag and withdrawn and placed on a separate [place] stand. It was an average size specimen. The sleeve was submerged under saline. I performed upper endoscopy. I advanced the endoscope into the duodenal bulb. No air bubbles or leaks were seen. No bleeding from the staple line, no narrowing at the angularis, no  pyloric spasm or deformity, no gastritis, no hiatal hernia or Leong's esophagus, and the endoscope was withdrawn. I inspected the subtotal gastrectomy specimen. Staples were all well-formed confirming laparoscopic findings. It was sent unopened to pathology for permanent section. Irrigation fluid was suctioned free. A couple areas of the sleeve were oozing. They did not respond to judicious cautery and therefore they were treated with 2-0 Vicryl suture, one at the apex of the superior aspect of the sleeve staple line which was treated with a figure-of-eight 2-0 Vicryl suture, which also was attached to surrounding omentum, another along the 2nd to last staple line and a small running 2-0 Vicryl was placed along the very distal aspect of the 1st staple line and going across over to the 2nd staple line. The sleeve was resting nicely and hemostatic. The sleeve staple line was treated with 4 mL of aerosolized Tisseel fibrin glue forming a nice seal. Photo documentation of the sleeve was obtained. The Gadiel retractor was removed. Fascia at the 15 mm trocar site incision was closed with a horizontal mattress 0 Vicryl suture placed with a suture passer under direct visualization and tying the knot extracorporeally. Fascia was infiltrated with approximately 10 mL of 0.25% Marcaine with epinephrine with the okay from anesthesia. The remaining trocars were removed under direct visualization with no bleeding noted from their sites. Subcutaneous tissue in the 15 mm incision was closed with a figure-of-eight 2-0 Vicryl Plus suture and the skin in each incision was closed using 3-0 Monocryl Plus in an interrupted subcuticular stitch followed by Skin Affix. The patient tolerated the procedure well without complication and was taken to the recovery room in stable condition.      MD WEN Locke/fredis  DD: 02/22/2017 15:35:33  DT: 02/22/2017 20:04:35  Voice Rec. ID #[id number]  Voice Original ID #[id number]  Doc ID  #[id number]  Rev. #0  cc:

## 2017-02-23 NOTE — PROGRESS NOTES
"Bariatric Surgery Progress Note:      Chief Complaint:  POD #1    Subjective     Interval History:  Doing well.  No complaints.  Pain controlled.  No vomiting, nausea controlled.  She is tolerating protein.  No fevers.  Ambulating.  Voiding.  IS 3500!!    Objective     Vital Signs  Blood pressure 102/63, pulse 59, temperature 98.5 °F (36.9 °C), temperature source Oral, resp. rate 18, height 63.5\" (161.3 cm), weight 264 lb 8.8 oz (120 kg), SpO2 92 %.      Intake/Output Summary (Last 24 hours) at 02/23/17 1543  Last data filed at 02/23/17 1508   Gross per 24 hour   Intake   2800 ml   Output     20 ml   Net   2780 ml       Physical Exam:  General: Alert, NAD  Lungs: Clear  Heart: RRR  Abdomen: soft, appropriate, incisions okay  Extremities: warm, (+) SCDs       Labs:  Lab Results (last 24 hours)     Procedure Component Value Units Date/Time    CBC & Differential [[id number]] Collected:  02/23/17 0454    Specimen:  Blood Updated:  02/23/17 0535    Narrative:       The following orders were created for panel order CBC & Differential.  Procedure                               Abnormality         Status                     ---------                               -----------         ------                     CBC Auto Differential[[id number]]         Abnormal            Final result                 Please view results for these tests on the individual orders.    CBC Auto Differential [[id number]]  (Abnormal) Collected:  02/23/17 0454    Specimen:  Blood Updated:  02/23/17 0535     WBC 7.91 10*3/mm3      RBC 4.49 10*6/mm3      Hemoglobin 12.9 g/dL      Hematocrit 38.4 %      MCV 85.5 fL      MCH 28.7 pg      MCHC 33.6 g/dL      RDW 13.7 %      RDW-SD 43.0 fl      MPV 9.8 fL      Platelets 203 10*3/mm3      Neutrophil % 85.7 (H) %      Lymphocyte % 9.6 (L) %      Monocyte % 4.6 %      Eosinophil % 0.0 %      Basophil % 0.0 %      Immature Grans % 0.1 %      Neutrophils, Absolute 6.78 10*3/mm3      Lymphocytes, Absolute 0.76 10*3/mm3 "      Monocytes, Absolute 0.36 10*3/mm3      Eosinophils, Absolute 0.00 (L) 10*3/mm3      Basophils, Absolute 0.00 10*3/mm3      Immature Grans, Absolute 0.01 10*3/mm3     Comprehensive Metabolic Panel [[id number]]  (Abnormal) Collected:  02/23/17 0454    Specimen:  Blood Updated:  02/23/17 0609     Glucose 120 (H) mg/dL      BUN 12 mg/dL      Creatinine 0.70 mg/dL      Sodium 136 mmol/L      Potassium 4.5 mmol/L      Chloride 102 mmol/L      CO2 31.0 mmol/L      Calcium 9.4 mg/dL      Total Protein 6.6 g/dL      Albumin 4.00 g/dL      ALT (SGPT) 31 U/L      AST (SGOT) 26 U/L      Alkaline Phosphatase 79 U/L      Total Bilirubin 0.4 mg/dL      eGFR Non African Amer 88 mL/min/1.73      Globulin 2.6 gm/dL      A/G Ratio 1.5 g/dL      BUN/Creatinine Ratio 17.1      Anion Gap 3.0 mmol/L     Narrative:       National Kid[employer] Guidelines    Stage                           Description                             GFR                      1                               Normal or High                          90+  2                               Mild decrease                            60-89  3                               Moderate decrease                   30-59  4                               Severe decrease                       15-29  5                               Kidney failure                             <15    Iron [[id number]]  (Abnormal) Collected:  02/23/17 0454    Specimen:  Blood Updated:  02/23/17 0609     Iron 41 (L) mcg/dL     Tissue Exam [[id number]] Collected:  02/22/17 1442    Specimen:  Tissue from Stomach Updated:  02/23/17 0852            Assessment/Plan     POD # 1 s/p LSG.    Doing well.  She had IV iron for Fe of 41.  UGI images and report were normal post-sleeve.    She feels well and meets discharge criteria.  Will discharge home with 1 week followup with PA.  Discharge instructions reviewed with patient (no family present) and questions answered.  Rx given for phenergan, Norco.  She has  pantoprazole prescription already and has enough for 2 months.  Instructions given to hold Lasix and KCl at home.            02/23/17  3:43 PM  Paula Shelton MD

## 2017-02-23 NOTE — PLAN OF CARE
Problem: Bariatric Surgery (Open/Laparoscopic) (Adult,Pediatric)  Goal: Signs and Symptoms of Listed Potential Problems Will be Absent or Manageable (Bariatric Surgery)  Outcome: Ongoing (interventions implemented as appropriate)    02/23/17 1638   Bariatric Surgery (Open/Laparoscopic)   Problems Assessed (Bariatric Surgery) pain   Problems Present (Bariatric Surgery) pain         Problem: Pressure Ulcer Risk (Jose Scale) (Adult,Obstetrics,Pediatric)  Goal: Identify Related Risk Factors and Signs and Symptoms  Outcome: Ongoing (interventions implemented as appropriate)    02/23/17 1638   Pressure Ulcer Risk (Jose Scale)   Related Risk Factors (Pressure Ulcer Risk (Jose Scale)) body weight extremes       Goal: Skin Integrity  Outcome: Ongoing (interventions implemented as appropriate)    02/23/17 1638   Pressure Ulcer Risk (Jose Scale) (Adult,Obstetrics,Pediatric)   Skin Integrity making progress toward outcome

## 2017-02-23 NOTE — PROGRESS NOTES
Discharge Planning Assessment  [facility]     Patient Name: Liliana Quevedo  MRN: [id number]  Today's Date: 2/23/2017    Admit Date: 2/22/2017          Discharge Needs Assessment       02/23/17 1452    Living Environment    Lives With spouse    Living Arrangements house    Provides Primary Care For no one    Quality Of Family Relationships unable to assess    Able to Return to Prior Living Arrangements yes    Discharge Needs Assessment    Concerns To Be Addressed no discharge needs identified    Readmission Within The Last 30 Days no previous admission in last 30 days    Anticipated Changes Related to Illness none    Equipment Currently Used at Home none    Equipment Needed After Discharge none    Transportation Available car;family or friend will provide    Discharge Disposition home or self-care    Discharge Contact Information if Applicable Jean Quevedo , spouse  [phone]            Discharge Plan       02/23/17 1452    Case Management/Social Work Plan    Plan Home    Patient/Family In Agreement With Plan yes    Additional Comments Spoke with patient at bedside regarding discharge planning.  Patient denies use of Home Health or DME.  Denies difficulty affording medications.  Patient lives with her  in a sing[place] level home.  No anticipated discharge needs noted.  Patient plans to discharge home via car with family when medically ready.          Discharge Placement     No information found        Expected Discharge Date and Time     Expected Discharge Date Expected Discharge Time    Feb 23, 2017               Demographic Summary       02/23/17 1451    Referral Information    Admission Type inpatient    Arrived From home or self-care    Referral Source admission list    Reason For Consult discharge planning    Record Reviewed clinical discipline documentation;history and physical;patient profile    Primary Care Physician Information    Name Geremias Ramesh MD            Functional Status       02/23/17  1451    Functional Status Current    Current Functional Level Comment Per Nursing Assessment    Functional Status Prior    Ambulation 0-->independent    Transferring 0-->independent    Toileting 0-->independent    Bathing 0-->independent    Dressing 0-->independent    Eating 0-->independent    Communication 0-->understands/communicates without difficulty    Swallowing 0-->swallows foods/liquids without difficulty    IADL    Medications independent    Meal Preparation independent    Housekeeping independent    Laundry independent    Shopping independent    Oral Care independent    Activity Tolerance    Current Activity Limitations none    Usual Activity Tolerance excellent    Current Activity Tolerance good    Employment/Financial    Employment/Finance Comments [employer]            Psychosocial     None            Abuse/Neglect     None            Legal     None            Substance Abuse     None            Patient Forms     None          Michelle Coates, RN

## 2017-02-24 LAB
CYTO UR: NORMAL
LAB AP CASE REPORT: NORMAL
LAB AP CLINICAL INFORMATION: NORMAL
Lab: NORMAL
PATH REPORT.FINAL DX SPEC: NORMAL
PATH REPORT.GROSS SPEC: NORMAL

## 2017-03-01 ENCOUNTER — OFFICE VISIT (OUTPATIENT)
Dept: BARIATRICS/WEIGHT MGMT | Facility: CLINIC | Age: 52
End: 2017-03-01

## 2017-03-01 VITALS
TEMPERATURE: 97.6 F | DIASTOLIC BLOOD PRESSURE: 64 MMHG | HEIGHT: 64 IN | SYSTOLIC BLOOD PRESSURE: 104 MMHG | HEART RATE: 60 BPM | RESPIRATION RATE: 18 BRPM | OXYGEN SATURATION: 99 % | BODY MASS INDEX: 42.68 KG/M2 | WEIGHT: 250 LBS

## 2017-03-01 DIAGNOSIS — G47.33 OBSTRUCTIVE SLEEP APNEA SYNDROME: ICD-10-CM

## 2017-03-01 DIAGNOSIS — Z98.84 STATUS POST BARIATRIC SURGERY: Primary | ICD-10-CM

## 2017-03-01 DIAGNOSIS — E78.00 PURE HYPERCHOLESTEROLEMIA: ICD-10-CM

## 2017-03-01 DIAGNOSIS — E55.9 VITAMIN D DEFICIENCY: ICD-10-CM

## 2017-03-01 DIAGNOSIS — E03.9 HYPOTHYROIDISM, UNSPECIFIED TYPE: ICD-10-CM

## 2017-03-01 DIAGNOSIS — E66.01 OBESITY, CLASS III, BMI 40-49.9 (MORBID OBESITY) (HCC): ICD-10-CM

## 2017-03-01 PROCEDURE — 99024 POSTOP FOLLOW-UP VISIT: CPT | Performed by: PHYSICIAN ASSISTANT

## 2017-03-01 NOTE — PROGRESS NOTES
"[facility] BARIATRIC SURGERY  [address]  [facility] [address] [phone]    Liliana Quevedo.  1965    REASON FOR VISIT: 1 week post-op        HPI:  Liliana Quevedo is a 51 y.o. female. 1 week s/p LSG by Dr. Gibbons on 02/22/2017. Overall feeling good. Reports nausea when taking MVI, resolved after she started on chewables. She is taking Norco just at night. Diarrhea when she drinks grape flavored protein shots but o/w tolerating stage 1 diet w/out issue.  she states that she is eating 70 grams of protein per day. She reports drinking 64-oz. of water per day, no carbonated beverage consumption. she is taking Pantoprazole.Holding Diuretics  and KCL. Taking MVI, B12, B1, Vit D and iron. Ambulating often. Pre-surgery weight:264  lbs. Today's weight is 250 lb (113 kg) pounds, today's BMI is Body mass index is 43.59 kg/(m^2)., and her weight loss since surgery is 14 pounds.       Past Medical History   Diagnosis Date   • Allergic rhinitis    • Arthritis    • Carpal tunnel syndrome    • Colon polyp      SIGMOID   • DDD (degenerative disc disease), lumbar    • Depression    • Elevated alkaline phosphatase level      102   • Fatigue    • Gastric ulcer    • GERD (gastroesophageal reflux disease)      on Protonix.  Says sx's not bad even if misses meds, \"depends on what I eat\".  EGD GDW 6/16 40 cm, path DE reflux.  serum h. pyl neg   • Hematuria    • Hyperlipidemia    • Hypertension      per prim MD note   • Hypothyroidism      w/ hx thyromegaly s/p CASTILLO.    • Incomplete right bundle branch block    • Low back pain    • Mild cardiomegaly    • Morbid obesity    • Nephrolithiasis    • Peripheral edema    • Scoliosis    • Sleep apnea      CPAP compliant   • Tendinitis    • Vitamin D deficiency      Past Surgical History   Procedure Laterality Date   • Ankle surgery Right 2000     dislocation repair w/ plate    • Nose surgery     • Hysterectomy Bilateral 2004     (open) w/ oophorectomy - " uterine fibroids and ovarian cysts   • Colonoscopy  2011     screening   • Laparoscopic cholecystectomy  2011     for stones   • Knee surgery Left 2015      ligament repair   • Carpal tunnel release Right    • Cyst removal Right      right index finger   • Gastric sleeve laparoscopic N/A 2/22/2017     Procedure: GASTRIC SLEEVE LAPAROSCOPIC;  Surgeon: Sergio Gibbons MD;  Location: [facility];  Service:        Current Outpatient Prescriptions:   •  citalopram (CeleXA) 40 MG tablet, Take 40 mg by mouth Daily., Disp: , Rfl:   •  HYDROcodone-acetaminophen (NORCO) 7.5-325 MG per tablet, Take 1 tablet by mouth Every 6 (Six) Hours As Needed for moderate pain (4-6)., Disp: 30 tablet, Rfl: 0  •  levothyroxine (SYNTHROID, LEVOTHROID) 150 MCG tablet, Take 150 mcg by mouth Daily., Disp: , Rfl:   •  omega-3 acid ethyl esters (LOVAZA) 1 G capsule, Take 4 g by mouth Every Night., Disp: , Rfl:   •  pantoprazole (PROTONIX) 40 MG EC tablet, Take 40 mg by mouth Every Evening., Disp: , Rfl:   •  pravastatin (PRAVACHOL) 20 MG tablet, Take 20 mg by mouth Every Night., Disp: , Rfl:   •  promethazine (PHENERGAN) 12.5 MG tablet, Take 2 tablets by mouth Every 6 (Six) Hours As Needed for nausea or vomiting., Disp: 20 tablet, Rfl: 0  Allergies   Allergen Reactions   • Ampicillin Hives   • Sulfa Antibiotics Hives     Hot and cold flashes, N&V, diarrhea     Social History     Social History   • Marital status:      Spouse name: N/A   • Number of children: N/A   • Years of education: N/A     Occupational History   • Not on file.     Social History Main Topics   • Smoking status: Never Smoker   • Smokeless tobacco: Never Used   • Alcohol use No   • Drug use: No   • Sexual activity: Not on file     Other Topics Concern   • Not on file     Social History Narrative    Lives in [place] w/ and inlaws    Working full time @[employer]      Review of Systems   General ROS: Negative for - fatigue  Ophthalmic ROS: Negative for -  "vision changes  ENT ROS: Negative for - swallowing difficulty  Respiratory ROS: no cough, shortness of breath, or wheezing  Cardiovascular ROS: no chest pain or dyspnea on exertion  Gastrointestinal ROS: Negative for - abdominal pain, constipation, diarrhea, heartburn/reflux, nausea/vomiting  Neurological ROS: Negative for - dizziness, numbness/tingling of extremities, memory loss  Dermatological ROS: Negative for - Hair loss     Physical Exam:  Visit Vitals   • /64   • Pulse 60   • Temp 97.6 °F (36.4 °C) (Temporal Artery )   • Resp 18   • Ht 63.5\" (161.3 cm)   • Wt 250 lb (113 kg)   • SpO2 99%   • BMI 43.59 kg/m2        General Appearance:  Well nourished.  In no acute distress.  Patient was observed to be obese.  Oral Cavity:   Buccal Mucosa: The buccal mucosa was moist.  Lungs:  Normal breath sounds/voice sounds.  Cardiovascular:   Heart Rate And Rhythm: Heart rate and rhythm normal.   Edema: No calf tenderness.  Abdomen:  Abdomen:incisions healing well.   Auscultation: The bowel sounds were normal.   Palpation: No mass was palpated in the abdomen, Soft, nontender, and nondistended.   Hernia: No hernia was discovered.  Musculoskeletal System:   General/bilateral: No edema present in extremities.  Normal movement of all extremities.  Neurological:  Was alert and oriented.   Gait And Stance: Gait and stance were normal.  Psychiatric:   Attitude: The attitude was cooperative.   Mood: Mood pleasant.  Skin:  The complexion was normal.  The skin moisture was normal and the skin temperature was normal.    Assessment:   1 week s/p LSG,  the patient is doing well.     ICD-10-CM ICD-9-CM   1. Status post bariatric surgery Z98.84 V45.86   2. Obesity, Class III, BMI 40-49.9 (morbid obesity) E66.01 278.01   3. Pure hypercholesterolemia E78.00 272.0   4. Obstructive sleep apnea syndrome G47.33 327.23   5. Vitamin D deficiency E55.9 268.9   6. Hypothyroidism, unspecified type E03.9 244.9       Plan:     Activity " restrictions: no lifting, pushing or pulling over 25lbs for 3 weeks.   Recommended patient be sure to get at least 70g protein per day, preferably > 100 gams of protein per day. Discussed with the patient the recommended amount of water per day to intake. Reviewed vitamin requirements. Be sure to do routine exercise and increase activity as tolerated. Advance diet per the manual. No ASA, NSAIDs, or steroids for 6 weeks.    • The patient was counseled regarding post op bariatric manual  • Instructed to call the office for concerns, questions, or problems.   •  The patient was instructed to follow up in 3 weeks.   note: approx 15 of the 25 minute visit was spent counseling on dietary/lifestyle modifications      Stacie Bradshaw, PAC

## 2017-03-21 RX ORDER — CITALOPRAM 40 MG/1
TABLET ORAL
Qty: 90 TABLET | Refills: 0 | Status: SHIPPED | OUTPATIENT
Start: 2017-03-21 | End: 2017-06-22 | Stop reason: SDUPTHER

## 2017-03-22 ENCOUNTER — OFFICE VISIT (OUTPATIENT)
Dept: BARIATRICS/WEIGHT MGMT | Facility: CLINIC | Age: 52
End: 2017-03-22

## 2017-03-22 VITALS
BODY MASS INDEX: 39.74 KG/M2 | HEART RATE: 68 BPM | SYSTOLIC BLOOD PRESSURE: 100 MMHG | HEIGHT: 65 IN | OXYGEN SATURATION: 98 % | DIASTOLIC BLOOD PRESSURE: 65 MMHG | WEIGHT: 238.5 LBS | TEMPERATURE: 97.5 F | RESPIRATION RATE: 20 BRPM

## 2017-03-22 DIAGNOSIS — K21.9 GASTROESOPHAGEAL REFLUX DISEASE, ESOPHAGITIS PRESENCE NOT SPECIFIED: ICD-10-CM

## 2017-03-22 DIAGNOSIS — E66.9 OBESITY, CLASS II, BMI 35-39.9: ICD-10-CM

## 2017-03-22 DIAGNOSIS — E78.5 HYPERLIPIDEMIA, UNSPECIFIED HYPERLIPIDEMIA TYPE: ICD-10-CM

## 2017-03-22 DIAGNOSIS — Z98.84 S/P BARIATRIC SURGERY: ICD-10-CM

## 2017-03-22 DIAGNOSIS — R53.83 FATIGUE, UNSPECIFIED TYPE: Primary | ICD-10-CM

## 2017-03-22 DIAGNOSIS — I10 ESSENTIAL HYPERTENSION: ICD-10-CM

## 2017-03-22 DIAGNOSIS — E55.9 VITAMIN D DEFICIENCY: ICD-10-CM

## 2017-03-22 DIAGNOSIS — G47.30 SLEEP APNEA, UNSPECIFIED TYPE: ICD-10-CM

## 2017-03-22 PROCEDURE — 99024 POSTOP FOLLOW-UP VISIT: CPT | Performed by: PHYSICIAN ASSISTANT

## 2017-03-22 NOTE — PROGRESS NOTES
"[facility] Bariatric Surgery  [address]  [facility] [id number] [phone]      Patient Name:  Liliana Quevedo.  :  1965      Date of Visit: 3/22/2017      Reason for Visit:  1 month postop    HPI:  Liliana Quevedo is a 51 y.o. female 1 month s/p LSG performed by Dr. Gibbons on 17.    Doing well.  No issues/concerns. Denies Abdominal pain, Nausea, Vomiting, Dysphagia and Reflux.  No further diarrhea.  Tolerating diet progression - on stage 4.  Getting 90g prot/day.  Taking MVI, B12, B1, Vit D and iron.  On Pantoprazole.  Holding Diuretics  - says has not needed them.  Not yet exercising      Presurgery weight:  264 pounds. Today's weight is 238 lb 8 oz (108 kg) pounds, today's Body mass index is 39.69 kg/(m^2)., and her weight loss since surgery is 26 pounds.       Past Medical History:   Diagnosis Date   • Allergic rhinitis    • Carpal tunnel syndrome    • Colon polyp     SIGMOID   • DDD (degenerative disc disease), lumbar    • Depression    • Elevated alkaline phosphatase level     102   • Fatigue    • GERD (gastroesophageal reflux disease)     on Protonix.  Says sx's not bad even if misses meds, \"depends on what I eat\".  EGD GDW  40 cm, path DE reflux.  serum h. pyl neg   • Hematuria    • Hyperlipidemia    • Hypertension     per prim MD note   • Hypothyroidism     w/ hx thyromegaly s/p CASTILLO.    • Incomplete right bundle branch block    • Low back pain    • Mild cardiomegaly    • Morbid obesity    • Nephrolithiasis    • Osteoarthritis    • Peripheral edema    • Scoliosis    • Sleep apnea     CPAP compliant   • Tendinitis    • Vitamin D deficiency      Past Surgical History:   Procedure Laterality Date   • ANKLE SURGERY Right 2000    dislocation repair w/ plate    • CARPAL TUNNEL RELEASE Right    • COLONOSCOPY      screening   • CYST REMOVAL Right     right index finger   • GASTRIC SLEEVE LAPAROSCOPIC N/A 2017    Procedure: GASTRIC SLEEVE LAPAROSCOPIC;  " Surgeon: Sergio Gibbons MD;  Location: [facility];  Service:    • HYSTERECTOMY Bilateral 2004    (open) w/ oophorectomy - uterine fibroids and ovarian cysts   • KNEE SURGERY Left 2015     ligament repair   • LAPAROSCOPIC CHOLECYSTECTOMY  2011    for stones   • NOSE SURGERY       Outpatient Prescriptions Marked as Taking for the 3/22/17 encounter (Office Visit) with SYED Vazquez   Medication Sig Dispense Refill   • citalopram (CeleXA) 40 MG tablet TAKE ONE TABLET BY MOUTH DAILY 90 tablet 0   • levothyroxine (SYNTHROID, LEVOTHROID) 150 MCG tablet Take 150 mcg by mouth Daily.     • omega-3 acid ethyl esters (LOVAZA) 1 G capsule Take 4 g by mouth Every Night.     • pantoprazole (PROTONIX) 40 MG EC tablet Take 40 mg by mouth Every Evening.     • pravastatin (PRAVACHOL) 20 MG tablet Take 20 mg by mouth Every Night.       Allergies   Allergen Reactions   • Ampicillin Hives   • Sulfa Antibiotics Hives     Hot and cold flashes, N&V, diarrhea       Social History     Social History   • Marital status:      Spouse name: N/A   • Number of children: N/A   • Years of education: N/A     Occupational History   • Not on file.     Social History Main Topics   • Smoking status: Never Smoker   • Smokeless tobacco: Never Used   • Alcohol use No   • Drug use: No   • Sexual activity: Not on file     Other Topics Concern   • Not on file     Social History Narrative    Lives in [place] w/ and inlaws    Working full time @[employer]      Vitals:    03/22/17 0927   BP: 100/65   Pulse: 68   Resp: 20   Temp: 97.5 °F (36.4 °C)   SpO2: 98%   Weight: 238 lb 8 oz (108 kg)   Body mass index is 39.69 kg/(m^2).    Physical Exam   Constitutional: She appears well-developed and well-nourished. She is cooperative.   obese   HENT:   Mouth/Throat: Oropharynx is clear and moist and mucous membranes are normal.   Eyes: Conjunctivae are normal. No scleral icterus.   Cardiovascular: Normal rate.    Pulmonary/Chest: Effort  normal.   Abdominal: Soft. Bowel sounds are normal. There is no tenderness.   Incisions well healed   Musculoskeletal: Normal range of motion. She exhibits no edema.   Neurological: She is alert.   Skin: Skin is warm and dry. No rash noted.   Psychiatric: She has a normal mood and affect. Judgment normal.         Assessment:  1 month s/p LSG performed by Dr. Gibbons on 2/22/17    Plan: Doing well. Continue to advance diet per manual.  Continue protein >70g/day.  Encouraged good food choices - high protein, low carb.  Increase exercise/activity as tolerated - no restrictions.  Routine bariatric labs ordered.  Continue vitamins w/ adjustments pending lab results.  RTC sooner w/ problems.     The patient was instructed to follow up in 2 months.

## 2017-03-25 LAB
25(OH)D3+25(OH)D2 SERPL-MCNC: 41.7 NG/ML
A-TOCOPHEROL VIT E SERPL-MCNC: 9.1 MG/L (ref 5.3–16.8)
ALBUMIN SERPL-MCNC: 4.4 G/DL (ref 3.2–4.8)
ALBUMIN/GLOB SERPL: 1.5 G/DL (ref 1.5–2.5)
ALP SERPL-CCNC: 112 U/L (ref 25–100)
ALT SERPL-CCNC: 27 U/L (ref 7–40)
AST SERPL-CCNC: 29 U/L (ref 0–33)
BASOPHILS # BLD AUTO: 0.03 10*3/MM3 (ref 0–0.2)
BASOPHILS NFR BLD AUTO: 0.6 % (ref 0–1)
BILIRUB SERPL-MCNC: 0.5 MG/DL (ref 0.3–1.2)
BUN SERPL-MCNC: 19 MG/DL (ref 9–23)
BUN/CREAT SERPL: 27.1 (ref 7–25)
CALCIUM SERPL-MCNC: 10.1 MG/DL (ref 8.7–10.4)
CHLORIDE SERPL-SCNC: 105 MMOL/L (ref 99–109)
CO2 SERPL-SCNC: 29 MMOL/L (ref 20–31)
CREAT SERPL-MCNC: 0.7 MG/DL (ref 0.6–1.3)
EOSINOPHIL # BLD AUTO: 0.11 10*3/MM3 (ref 0.1–0.3)
EOSINOPHIL NFR BLD AUTO: 2.2 % (ref 0–3)
ERYTHROCYTE [DISTWIDTH] IN BLOOD BY AUTOMATED COUNT: 14.1 % (ref 11.3–14.5)
FERRITIN SERPL-MCNC: 317 NG/ML (ref 10–291)
FOLATE SERPL-MCNC: 16.69 NG/ML (ref 3.2–20)
GLOBULIN SER CALC-MCNC: 2.9 GM/DL
GLUCOSE SERPL-MCNC: 83 MG/DL (ref 70–100)
HCT VFR BLD AUTO: 41.3 % (ref 34.5–44)
HGB BLD-MCNC: 13.2 G/DL (ref 11.5–15.5)
IMM GRANULOCYTES # BLD: 0 10*3/MM3 (ref 0–0.03)
IMM GRANULOCYTES NFR BLD: 0 % (ref 0–0.6)
IRON SERPL-MCNC: 63 MCG/DL (ref 50–175)
LYMPHOCYTES # BLD AUTO: 1.43 10*3/MM3 (ref 0.6–4.8)
LYMPHOCYTES NFR BLD AUTO: 28.7 % (ref 24–44)
MAGNESIUM SERPL-MCNC: 2.3 MG/DL (ref 1.3–2.7)
MCH RBC QN AUTO: 27.8 PG (ref 27–31)
MCHC RBC AUTO-ENTMCNC: 32 G/DL (ref 32–36)
MCV RBC AUTO: 87.1 FL (ref 80–99)
METHYLMALONATE SERPL-SCNC: 196 NMOL/L (ref 0–378)
MONOCYTES # BLD AUTO: 0.39 10*3/MM3 (ref 0–1)
MONOCYTES NFR BLD AUTO: 7.8 % (ref 0–12)
NEUTROPHILS # BLD AUTO: 3.03 10*3/MM3 (ref 1.5–8.3)
NEUTROPHILS NFR BLD AUTO: 60.7 % (ref 41–71)
PHOSPHATE SERPL-MCNC: 4 MG/DL (ref 2.4–5.1)
PLATELET # BLD AUTO: 244 10*3/MM3 (ref 150–450)
POTASSIUM SERPL-SCNC: 4.2 MMOL/L (ref 3.5–5.5)
PREALB SERPL-MCNC: 19 MG/DL (ref 10–36)
PROT SERPL-MCNC: 7.3 G/DL (ref 5.7–8.2)
PTH-INTACT SERPL-MCNC: 21 PG/ML (ref 15–65)
RBC # BLD AUTO: 4.74 10*6/MM3 (ref 3.89–5.14)
SODIUM SERPL-SCNC: 141 MMOL/L (ref 132–146)
VIT A SERPL-MCNC: 39 UG/DL (ref 20–65)
VIT B1 BLD-SCNC: 165 NMOL/L (ref 66.5–200)
WBC # BLD AUTO: 4.99 10*3/MM3 (ref 3.5–10.8)
ZINC SERPL-MCNC: 100 UG/DL (ref 56–134)

## 2017-04-21 RX ORDER — PANTOPRAZOLE SODIUM 40 MG/1
TABLET, DELAYED RELEASE ORAL
Qty: 30 TABLET | Refills: 1 | Status: SHIPPED | OUTPATIENT
Start: 2017-04-21 | End: 2017-07-05 | Stop reason: SDUPTHER

## 2017-05-03 PROBLEM — IMO0002 STROKE SYNDROME: Status: ACTIVE | Noted: 2017-05-03

## 2017-05-03 PROBLEM — G56.00 CARPAL TUNNEL SYNDROME: Status: ACTIVE | Noted: 2017-05-03

## 2017-05-03 PROBLEM — M81.0 OSTEOPOROSIS: Status: ACTIVE | Noted: 2017-05-03

## 2017-05-03 PROBLEM — J45.909 ASTHMA: Status: ACTIVE | Noted: 2017-05-03

## 2017-05-22 ENCOUNTER — OFFICE VISIT (OUTPATIENT)
Dept: BARIATRICS/WEIGHT MGMT | Facility: CLINIC | Age: 52
End: 2017-05-22

## 2017-05-22 VITALS
BODY MASS INDEX: 37.81 KG/M2 | TEMPERATURE: 97.2 F | SYSTOLIC BLOOD PRESSURE: 96 MMHG | RESPIRATION RATE: 16 BRPM | HEIGHT: 64 IN | DIASTOLIC BLOOD PRESSURE: 64 MMHG | OXYGEN SATURATION: 99 % | WEIGHT: 221.5 LBS | HEART RATE: 55 BPM

## 2017-05-22 DIAGNOSIS — K21.9 GERD WITHOUT ESOPHAGITIS: ICD-10-CM

## 2017-05-22 DIAGNOSIS — E66.9 OBESITY (BMI 35.0-39.9 WITHOUT COMORBIDITY): ICD-10-CM

## 2017-05-22 DIAGNOSIS — Z98.84 STATUS POST BARIATRIC SURGERY: Primary | ICD-10-CM

## 2017-05-22 DIAGNOSIS — G47.33 OSA (OBSTRUCTIVE SLEEP APNEA): ICD-10-CM

## 2017-05-22 DIAGNOSIS — E78.00 ELEVATED CHOLESTEROL: ICD-10-CM

## 2017-05-22 DIAGNOSIS — R53.83 FATIGUE, UNSPECIFIED TYPE: ICD-10-CM

## 2017-05-22 PROCEDURE — 99024 POSTOP FOLLOW-UP VISIT: CPT | Performed by: PHYSICIAN ASSISTANT

## 2017-05-22 RX ORDER — FUROSEMIDE 40 MG/1
TABLET ORAL
COMMUNITY
Start: 2017-05-17 | End: 2017-07-28

## 2017-05-24 LAB
ALBUMIN SERPL-MCNC: 4.4 G/DL (ref 3.2–4.8)
ALBUMIN/GLOB SERPL: 1.7 G/DL (ref 1.5–2.5)
ALP SERPL-CCNC: 113 U/L (ref 25–100)
ALT SERPL-CCNC: 21 U/L (ref 7–40)
AST SERPL-CCNC: 25 U/L (ref 0–33)
BILIRUB SERPL-MCNC: 0.5 MG/DL (ref 0.3–1.2)
BUN SERPL-MCNC: 17 MG/DL (ref 9–23)
BUN/CREAT SERPL: 28.3 (ref 7–25)
CALCIUM SERPL-MCNC: 10 MG/DL (ref 8.7–10.4)
CHLORIDE SERPL-SCNC: 107 MMOL/L (ref 99–109)
CO2 SERPL-SCNC: 31 MMOL/L (ref 20–31)
CREAT SERPL-MCNC: 0.6 MG/DL (ref 0.6–1.3)
ERYTHROCYTE [DISTWIDTH] IN BLOOD BY AUTOMATED COUNT: 14.3 % (ref 11.3–14.5)
FERRITIN SERPL-MCNC: 218 NG/ML (ref 10–291)
FOLATE SERPL-MCNC: >24 NG/ML (ref 3.2–20)
GLOBULIN SER CALC-MCNC: 2.6 GM/DL
GLUCOSE SERPL-MCNC: 78 MG/DL (ref 70–100)
HCT VFR BLD AUTO: 41.9 % (ref 34.5–44)
HGB BLD-MCNC: 13.3 G/DL (ref 11.5–15.5)
IRON SERPL-MCNC: 46 MCG/DL (ref 50–175)
MCH RBC QN AUTO: 27.7 PG (ref 27–31)
MCHC RBC AUTO-ENTMCNC: 31.7 G/DL (ref 32–36)
MCV RBC AUTO: 87.1 FL (ref 80–99)
METHYLMALONATE SERPL-SCNC: 198 NMOL/L (ref 0–378)
PLATELET # BLD AUTO: 205 10*3/MM3 (ref 150–450)
POTASSIUM SERPL-SCNC: 4.3 MMOL/L (ref 3.5–5.5)
PREALB SERPL-MCNC: 19 MG/DL (ref 10–36)
PROT SERPL-MCNC: 7 G/DL (ref 5.7–8.2)
RBC # BLD AUTO: 4.81 10*6/MM3 (ref 3.89–5.14)
SODIUM SERPL-SCNC: 141 MMOL/L (ref 132–146)
VIT B1 BLD-SCNC: 150 NMOL/L (ref 66.5–200)
WBC # BLD AUTO: 5.88 10*3/MM3 (ref 3.5–10.8)

## 2017-06-22 RX ORDER — CITALOPRAM 40 MG/1
TABLET ORAL
Qty: 30 TABLET | Refills: 0 | Status: SHIPPED | OUTPATIENT
Start: 2017-06-22 | End: 2017-07-24 | Stop reason: SDUPTHER

## 2017-06-29 ENCOUNTER — OFFICE VISIT (OUTPATIENT)
Dept: NEUROLOGY | Facility: CLINIC | Age: 52
End: 2017-06-29

## 2017-06-29 VITALS
DIASTOLIC BLOOD PRESSURE: 80 MMHG | HEIGHT: 64 IN | WEIGHT: 214 LBS | BODY MASS INDEX: 36.54 KG/M2 | SYSTOLIC BLOOD PRESSURE: 125 MMHG

## 2017-06-29 DIAGNOSIS — G47.33 OBSTRUCTIVE SLEEP APNEA SYNDROME: Primary | ICD-10-CM

## 2017-06-29 PROCEDURE — 99214 OFFICE O/P EST MOD 30 MIN: CPT | Performed by: PSYCHIATRY & NEUROLOGY

## 2017-06-29 NOTE — PROGRESS NOTES
Subjective   Liliana Quevedo is a 51 y.o. female.     History of Present Illness   Originally seen in November of 2012 for her severe obstructive sleep apnea with  during the untreated portion of her split-night sleep study. We started her on CPAP at 15 cm. At her last visit in July 2015, download showed 100% compliance and AHI of 0.7.  6/16: planning weight loss surgery (plastic sleeve) in about 6 months, in planning stages now. Seeing Dr Ramesh every month.   She brings a download from her machine from the last few months today, again showing 100% compliance (days greater than 4 hours use) and AHI very low at 0.8.  Today: feels doing well. Using CPAP, feels OK at current pressure. Has download but forgot to bring. Gets equipment through [employer].  Had gastric sleeve in February, has lost almost 70lb. Takes lots of vitamins, more energy. Wakes rested, no snoring.  Mother, aunt and grandmother all have ABDULLAHI.    The following portions of the patient's history were reviewed and updated as appropriate: allergies, current medications, past family history, past medical history, past social history, past surgical history and problem list.    Review of Systems   Constitutional: Negative for fatigue, fever and unexpected weight change.   Respiratory: Negative for cough and shortness of breath.    Cardiovascular: Negative for chest pain.       Objective   Physical Exam   Constitutional: She is oriented to person, place, and time. She appears well-developed and well-nourished.   HENT:   Head: Normocephalic and atraumatic.   Eyes: EOM are normal. Pupils are equal, round, and reactive to light.   Pulmonary/Chest: Effort normal.   Musculoskeletal: Normal range of motion.   Neurological: She is oriented to person, place, and time. She has a normal Finger-Nose-Finger Test and a normal Heel to Shin Test. Gait normal.   Skin: Skin is warm and dry.   Psychiatric: Her speech is normal.   Nursing note and vitals  reviewed.      Neurologic Exam     Mental Status   Oriented to person, place, and time.   Speech: speech is normal   Level of consciousness: alert  Knowledge: consistent with education.   Able to name object. Normal comprehension.        Small oropharyngeal opening     Cranial Nerves     CN II   Visual fields full to confrontation.     CN III, IV, VI   Pupils are equal, round, and reactive to light.  Extraocular motions are normal.     CN VII   Facial expression full, symmetric.     CN IX, X   CN IX normal.   CN X normal.   Palate: symmetric    CN XI   CN XI normal.     CN XII   CN XII normal.     Motor Exam   Muscle bulk: normal  Right arm pronator drift: absent  Left arm pronator drift: absent    Strength   Strength 5/5 except as noted.     Sensory Exam   Light touch normal.     Gait, Coordination, and Reflexes     Gait  Gait: normal    Coordination   Finger to nose coordination: normal  Heel to shin coordination: normal    Tremor   Resting tremor: absent  Intention tremor: absent  Action tremor: absent      Assessment/Plan   Liliana was seen today for sleeping problem.    Diagnoses and all orders for this visit:    Obstructive sleep apnea syndrome    Discussion/Summary:  Doing well. Discussed effects of weight loss on ABDULLAHI. Will review download. If OK, pt still to monitor for increased sleepiness, snoring, or intolerance of pressure. Discussed may need repeat study in future.  25 minutes face to face, 20 minutes spent in discussion as above.  Return in about 1 year (around 6/29/2018).

## 2017-07-05 RX ORDER — PANTOPRAZOLE SODIUM 40 MG/1
TABLET, DELAYED RELEASE ORAL
Qty: 30 TABLET | Refills: 5 | Status: SHIPPED | OUTPATIENT
Start: 2017-07-05 | End: 2017-07-07 | Stop reason: SDUPTHER

## 2017-07-07 RX ORDER — PANTOPRAZOLE SODIUM 40 MG/1
40 TABLET, DELAYED RELEASE ORAL DAILY
Qty: 30 TABLET | Refills: 5 | Status: SHIPPED | OUTPATIENT
Start: 2017-07-07 | End: 2017-08-06

## 2017-07-24 RX ORDER — CITALOPRAM 40 MG/1
TABLET ORAL
Qty: 30 TABLET | Refills: 0 | Status: SHIPPED | OUTPATIENT
Start: 2017-07-24 | End: 2017-07-28 | Stop reason: SDUPTHER

## 2017-07-28 ENCOUNTER — OFFICE VISIT (OUTPATIENT)
Dept: INTERNAL MEDICINE | Facility: CLINIC | Age: 52
End: 2017-07-28

## 2017-07-28 VITALS
WEIGHT: 209 LBS | SYSTOLIC BLOOD PRESSURE: 118 MMHG | DIASTOLIC BLOOD PRESSURE: 64 MMHG | TEMPERATURE: 97.8 F | HEART RATE: 52 BPM | BODY MASS INDEX: 35.87 KG/M2 | RESPIRATION RATE: 18 BRPM

## 2017-07-28 DIAGNOSIS — Z13.220 LIPID SCREENING: ICD-10-CM

## 2017-07-28 DIAGNOSIS — F33.0 MILD EPISODE OF RECURRENT MAJOR DEPRESSIVE DISORDER (HCC): ICD-10-CM

## 2017-07-28 DIAGNOSIS — Z11.59 NEED FOR HEPATITIS C SCREENING TEST: Primary | ICD-10-CM

## 2017-07-28 LAB
ARTICHOKE IGE QN: 52 MG/DL (ref 0–130)
CHOLEST SERPL-MCNC: 118 MG/DL (ref 0–200)
HCV AB SER DONR QL: NORMAL
HDLC SERPL-MCNC: 45 MG/DL (ref 40–60)
TRIGL SERPL-MCNC: 70 MG/DL (ref 0–150)

## 2017-07-28 PROCEDURE — 80061 LIPID PANEL: CPT | Performed by: INTERNAL MEDICINE

## 2017-07-28 PROCEDURE — 36415 COLL VENOUS BLD VENIPUNCTURE: CPT | Performed by: INTERNAL MEDICINE

## 2017-07-28 PROCEDURE — 86803 HEPATITIS C AB TEST: CPT | Performed by: INTERNAL MEDICINE

## 2017-07-28 PROCEDURE — 99213 OFFICE O/P EST LOW 20 MIN: CPT | Performed by: INTERNAL MEDICINE

## 2017-07-28 RX ORDER — CITALOPRAM 40 MG/1
40 TABLET ORAL DAILY
Qty: 30 TABLET | Refills: 6 | Status: SHIPPED | OUTPATIENT
Start: 2017-07-28 | End: 2018-03-23 | Stop reason: SDUPTHER

## 2017-07-28 NOTE — PROGRESS NOTES
Subjective   Liliana Quevedo is a 51 y.o. female.     History of Present Illness     1 Depression- chronic and having mild exacerbation.  Patient currently is dealing with a lot of stress with taking care of her mother-in-law (currently in Hospice) and also her father who are both elderly and ailng.  Patient says that she has good support with her  who is very helpful and understanding.  She reports no episodes of worsening depression, suicide ideations, hallucinations, or any other emotional liability.      Review of Systems   All other systems reviewed and are negative.      Objective   Physical Exam   Constitutional: She appears well-developed and well-nourished.   HENT:   Head: Normocephalic.   Right Ear: External ear normal.   Left Ear: External ear normal.   Nose: Nose normal.   Mouth/Throat: Oropharynx is clear and moist.   Eyes: Conjunctivae and EOM are normal. Pupils are equal, round, and reactive to light.   Neck: Normal range of motion. Neck supple.   Cardiovascular: Normal rate, regular rhythm, normal heart sounds and intact distal pulses.    Pulmonary/Chest: Effort normal and breath sounds normal.   Nursing note and vitals reviewed.      Assessment/Plan   Liliana was seen today for depression.    Diagnoses and all orders for this visit:    Need for hepatitis C screening test    Mild episode of recurrent major depressive disorder  -     citalopram (CeleXA) 40 MG tablet; Take 1 tablet by mouth Daily.

## 2017-08-22 ENCOUNTER — OFFICE VISIT (OUTPATIENT)
Dept: BARIATRICS/WEIGHT MGMT | Facility: CLINIC | Age: 52
End: 2017-08-22

## 2017-08-22 VITALS
DIASTOLIC BLOOD PRESSURE: 68 MMHG | RESPIRATION RATE: 18 BRPM | BODY MASS INDEX: 34.66 KG/M2 | WEIGHT: 203 LBS | OXYGEN SATURATION: 99 % | TEMPERATURE: 97.2 F | HEART RATE: 58 BPM | SYSTOLIC BLOOD PRESSURE: 98 MMHG | HEIGHT: 64 IN

## 2017-08-22 DIAGNOSIS — R10.13 DYSPEPSIA: Primary | ICD-10-CM

## 2017-08-22 DIAGNOSIS — E03.9 HYPOTHYROIDISM, UNSPECIFIED TYPE: ICD-10-CM

## 2017-08-22 DIAGNOSIS — Z98.84 S/P BARIATRIC SURGERY: ICD-10-CM

## 2017-08-22 DIAGNOSIS — E66.9 OBESITY, CLASS II, BMI 35-39.9: ICD-10-CM

## 2017-08-22 DIAGNOSIS — R53.83 FATIGUE, UNSPECIFIED TYPE: ICD-10-CM

## 2017-08-22 DIAGNOSIS — E61.1 IRON DEFICIENCY: ICD-10-CM

## 2017-08-22 PROBLEM — E66.01 OBESITY, CLASS III, BMI 40-49.9 (MORBID OBESITY): Status: RESOLVED | Noted: 2017-02-22 | Resolved: 2017-08-22

## 2017-08-22 PROBLEM — E66.813 OBESITY, CLASS III, BMI 40-49.9 (MORBID OBESITY): Status: RESOLVED | Noted: 2017-02-22 | Resolved: 2017-08-22

## 2017-08-22 PROCEDURE — 99214 OFFICE O/P EST MOD 30 MIN: CPT | Performed by: PHYSICIAN ASSISTANT

## 2017-08-22 RX ORDER — ASPIRIN 81 MG/1
81 TABLET, CHEWABLE ORAL DAILY
COMMUNITY
End: 2017-12-22

## 2017-08-22 RX ORDER — PANTOPRAZOLE SODIUM 20 MG/1
20 TABLET, DELAYED RELEASE ORAL DAILY
COMMUNITY
End: 2017-10-30 | Stop reason: DRUGHIGH

## 2017-08-22 NOTE — PROGRESS NOTES
"[facility]  [address]  [facility] [id number] [phone]        Patient Name:  Liliana Quevedo.  :  1965      Date of Visit: 2017      Reason for Visit:   6 months postop      HPI: Liliana Quevedo is a 51 y.o. female s/p LSG by GDW on 17    Doing well.  No issues/concerns.  Denies dysphagia, reflux, nausea, vomiting and abdominal pain.  Continues to focus on healthy food choices - protein, then veggies, then fruit.  Getting 80g prot/day, typically w/ a protein shake in the AMs.  Drinking 60+ fluid oz/day.  3 month labs revealed low iron (46) .  Taking MVI, B12, B1, Vit D and iron daily.  On Pantoprazole.  Exercising - just joined [employer] - walking the track and doing water aerobics.     Presurgery weight: 264 pounds.  Today's weight is 203 lb (92.1 kg) pounds, today's  Body mass index is 35.4 kg/(m^2)., and her weight loss since surgery is 61 pounds.  Hoping to reach 160 lbs.    Past Medical History:   Diagnosis Date   • Acute sinusitis     Assessed By: Geremias Ramesh (Internal Medicine); Last Assessed: 2015   • Allergic rhinitis    • Carpal tunnel syndrome    • Colon polyp     SIGMOID   • DDD (degenerative disc disease), lumbar    • De Quervain's tenosynovitis    • Depression    • Edema     Assessed By: Geremias Ramesh (Internal Medicine); Last Assessed: 2014   • Elevated alkaline phosphatase level     102   • Fatigue    • Gastric ulcer    • GERD (gastroesophageal reflux disease)     on Protonix.  Says sx's not bad even if misses meds, \"depends on what I eat\".  EGD GDW  40 cm, path DE reflux.  serum h. pyl neg   • Hematuria    • Hyperlipidemia    • Hypertension     per prim MD note   • Hypothyroidism     w/ hx thyromegaly s/p CASTILLO.    • Incomplete right bundle branch block    • Low back pain    • Mild cardiomegaly    • Morbid obesity    • Nephrolithiasis    • Osteoarthritis    • Peripheral edema    • Polyp of sigmoid colon    • " Scoliosis    • Sleep apnea     CPAP compliant   • Tendinitis    • Viral URI     Assessed By: Geremias Ramesh (Internal Medicine); Last Assessed: 23 Jan 2015   • Vitamin D deficiency    • Weight gain     Assessed By: Geremias Ramesh (Internal Medicine); Last Assessed: 26 Nov 2014     Past Surgical History:   Procedure Laterality Date   • ANKLE SURGERY Right 2000    dislocation repair w/ plate    • CARPAL TUNNEL RELEASE Right    • COLONOSCOPY  2011    screening   • CYST REMOVAL Right     right index finger   • GASTRIC SLEEVE LAPAROSCOPIC N/A 2/22/2017    Procedure: GASTRIC SLEEVE LAPAROSCOPIC;  Surgeon: Sergio Gibbons MD;  [facility];  Service:    • HYSTERECTOMY Bilateral 2004    (open) w/ oophorectomy - uterine fibroids and ovarian cysts   • KNEE SURGERY Left 2015     ligament repair   • LAPAROSCOPIC CHOLECYSTECTOMY  2011    for stones   • NOSE SURGERY       Outpatient Prescriptions Marked as Taking for the 8/22/17 encounter (Office Visit) with SYED Vazquez   Medication Sig Dispense Refill   • aspirin 81 MG chewable tablet Chew 81 mg Daily.     • citalopram (CeleXA) 40 MG tablet Take 1 tablet by mouth Daily. 30 tablet 6   • levothyroxine (SYNTHROID, LEVOTHROID) 150 MCG tablet Take 150 mcg by mouth Daily.     • omega-3 acid ethyl esters (LOVAZA) 1 G capsule Take 4 g by mouth Every Night.     • pantoprazole (PROTONIX) 20 MG EC tablet Take 20 mg by mouth Daily.     • pravastatin (PRAVACHOL) 20 MG tablet Take 20 mg by mouth Every Night.         Allergies   Allergen Reactions   • Ampicillin Hives   • Sulfa Antibiotics Hives     Hot and cold flashes, N&V, diarrhea       Social History     Social History   • Marital status:      Spouse name: N/A   • Number of children: N/A   • Years of education: N/A     Occupational History   • Not on file.     Social History Main Topics   • Smoking status: Never Smoker   • Smokeless tobacco: Never Used   • Alcohol use No   • Drug use: No   • Sexual activity: Not on  "file     Other Topics Concern   • Not on file     Social History Narrative    Lives in [place] w/ and inlaws    Working full time @[employer]        BP 98/68 (BP Location: Left arm, Patient Position: Sitting, Cuff Size: Large Adult)  Pulse 58  Temp 97.2 °F (36.2 °C) (Temporal Artery )   Resp 18  Ht 63.5\" (161.3 cm)  Wt 203 lb (92.1 kg)  SpO2 99%  BMI 35.4 kg/m2    Physical Exam   Constitutional: She appears well-developed and well-nourished. She is cooperative.   obese   HENT:   Mouth/Throat: Oropharynx is clear and moist and mucous membranes are normal.   Eyes: Conjunctivae are normal. No scleral icterus.   Cardiovascular: Normal rate.    Pulmonary/Chest: Effort normal.   Abdominal: Soft. Bowel sounds are normal. There is no tenderness.   Incisions well healed   Musculoskeletal: Normal range of motion. She exhibits no edema.   Neurological: She is alert.   Skin: Skin is warm and dry. No rash noted.   Psychiatric: She has a normal mood and affect. Judgment normal.         Assessment:  6 months s/p LSG by WEN on 2/22/17    ICD-10-CM ICD-9-CM   1. Dyspepsia R10.13 536.8   2. Fatigue, unspecified type R53.83 780.79   3. Iron deficiency E61.1 280.9   4. Hypothyroidism, unspecified type E03.9 244.9   5. Obesity, Class II, BMI 35-39.9 E66.01 278.01   6. S/P bariatric surgery Z98.84 V45.86         Plan:  Continue w/ good food choices and healthy habits.  Continue to focus on high protein, low carb.  Keep tracking intake.  Continue routine exercise.  Routine bariatric labs ordered including TSH per patient request.  Continue vitamins w/ adjustments pending lab results.  Call w/ problems/concerns.     The patient was instructed to follow up in 6 months, sooner if needed.    note: approx 15 of the 25 minute visit was spent counseling on nutrition and necessary dietary/lifestyle modifications.      "

## 2017-08-26 LAB
ALBUMIN SERPL-MCNC: 4.4 G/DL (ref 3.2–4.8)
ALBUMIN/GLOB SERPL: 1.6 G/DL (ref 1.5–2.5)
ALP SERPL-CCNC: 130 U/L (ref 25–100)
ALT SERPL-CCNC: 30 U/L (ref 7–40)
AST SERPL-CCNC: 31 U/L (ref 0–33)
BASOPHILS # BLD AUTO: 0.01 10*3/MM3 (ref 0–0.2)
BASOPHILS NFR BLD AUTO: 0.2 % (ref 0–1)
BILIRUB SERPL-MCNC: 0.6 MG/DL (ref 0.3–1.2)
BUN SERPL-MCNC: 19 MG/DL (ref 9–23)
BUN/CREAT SERPL: 27.1 (ref 7–25)
CALCIUM SERPL-MCNC: 9.9 MG/DL (ref 8.7–10.4)
CHLORIDE SERPL-SCNC: 103 MMOL/L (ref 99–109)
CO2 SERPL-SCNC: 30 MMOL/L (ref 20–31)
CREAT SERPL-MCNC: 0.7 MG/DL (ref 0.6–1.3)
EOSINOPHIL # BLD AUTO: 0.06 10*3/MM3 (ref 0–0.3)
EOSINOPHIL NFR BLD AUTO: 1.4 % (ref 0–3)
ERYTHROCYTE [DISTWIDTH] IN BLOOD BY AUTOMATED COUNT: 13.7 % (ref 11.3–14.5)
FERRITIN SERPL-MCNC: 238 NG/ML (ref 10–291)
FOLATE SERPL-MCNC: 19.89 NG/ML (ref 3.2–20)
GLOBULIN SER CALC-MCNC: 2.8 GM/DL
GLUCOSE SERPL-MCNC: 76 MG/DL (ref 70–100)
HCT VFR BLD AUTO: 41.1 % (ref 34.5–44)
HGB BLD-MCNC: 13.3 G/DL (ref 11.5–15.5)
IMM GRANULOCYTES # BLD: 0.01 10*3/MM3 (ref 0–0.03)
IMM GRANULOCYTES NFR BLD: 0.2 % (ref 0–0.6)
IRON SERPL-MCNC: 72 MCG/DL (ref 50–175)
LYMPHOCYTES # BLD AUTO: 1.37 10*3/MM3 (ref 0.6–4.8)
LYMPHOCYTES NFR BLD AUTO: 31.9 % (ref 24–44)
MCH RBC QN AUTO: 27.8 PG (ref 27–31)
MCHC RBC AUTO-ENTMCNC: 32.4 G/DL (ref 32–36)
MCV RBC AUTO: 85.8 FL (ref 80–99)
METHYLMALONATE SERPL-SCNC: 187 NMOL/L (ref 0–378)
MONOCYTES # BLD AUTO: 0.26 10*3/MM3 (ref 0–1)
MONOCYTES NFR BLD AUTO: 6.1 % (ref 0–12)
NEUTROPHILS # BLD AUTO: 2.58 10*3/MM3 (ref 1.5–8.3)
NEUTROPHILS NFR BLD AUTO: 60.2 % (ref 41–71)
PLATELET # BLD AUTO: 208 10*3/MM3 (ref 150–450)
POTASSIUM SERPL-SCNC: 4.6 MMOL/L (ref 3.5–5.5)
PROT SERPL-MCNC: 7.2 G/DL (ref 5.7–8.2)
RBC # BLD AUTO: 4.79 10*6/MM3 (ref 3.89–5.14)
SODIUM SERPL-SCNC: 139 MMOL/L (ref 132–146)
TSH SERPL DL<=0.005 MIU/L-ACNC: 0.3 MIU/ML (ref 0.35–5.35)
VIT B1 BLD-SCNC: 146.4 NMOL/L (ref 66.5–200)
WBC # BLD AUTO: 4.29 10*3/MM3 (ref 3.5–10.8)

## 2017-10-13 ENCOUNTER — TRANSCRIBE ORDERS (OUTPATIENT)
Dept: ADMINISTRATIVE | Facility: HOSPITAL | Age: 52
End: 2017-10-13

## 2017-10-13 DIAGNOSIS — Z12.31 VISIT FOR SCREENING MAMMOGRAM: Primary | ICD-10-CM

## 2017-10-16 ENCOUNTER — HOSPITAL ENCOUNTER (OUTPATIENT)
Dept: MAMMOGRAPHY | Facility: HOSPITAL | Age: 52
Discharge: HOME | End: 2017-10-16
Admitting: OBSTETRICS & GYNECOLOGY

## 2017-10-16 DIAGNOSIS — Z12.31 VISIT FOR SCREENING MAMMOGRAM: ICD-10-CM

## 2017-10-16 PROCEDURE — 77063 BREAST TOMOSYNTHESIS BI: CPT

## 2017-10-16 PROCEDURE — G0202 SCR MAMMO BI INCL CAD: HCPCS

## 2017-10-17 PROCEDURE — 77063 BREAST TOMOSYNTHESIS BI: CPT | Performed by: RADIOLOGY

## 2017-10-17 PROCEDURE — 77067 SCR MAMMO BI INCL CAD: CPT | Performed by: RADIOLOGY

## 2017-10-30 ENCOUNTER — OFFICE VISIT (OUTPATIENT)
Dept: INTERNAL MEDICINE | Facility: CLINIC | Age: 52
End: 2017-10-30

## 2017-10-30 VITALS
DIASTOLIC BLOOD PRESSURE: 60 MMHG | RESPIRATION RATE: 18 BRPM | WEIGHT: 195.38 LBS | BODY MASS INDEX: 34.07 KG/M2 | TEMPERATURE: 97.5 F | SYSTOLIC BLOOD PRESSURE: 114 MMHG | HEART RATE: 58 BPM

## 2017-10-30 DIAGNOSIS — F33.0 MILD EPISODE OF RECURRENT MAJOR DEPRESSIVE DISORDER (HCC): Primary | ICD-10-CM

## 2017-10-30 DIAGNOSIS — L71.9 ACNE ROSACEA: ICD-10-CM

## 2017-10-30 DIAGNOSIS — F43.21 GRIEF: ICD-10-CM

## 2017-10-30 PROCEDURE — 99214 OFFICE O/P EST MOD 30 MIN: CPT | Performed by: INTERNAL MEDICINE

## 2017-10-30 PROCEDURE — 90471 IMMUNIZATION ADMIN: CPT | Performed by: INTERNAL MEDICINE

## 2017-10-30 PROCEDURE — 90686 IIV4 VACC NO PRSV 0.5 ML IM: CPT | Performed by: INTERNAL MEDICINE

## 2017-10-30 RX ORDER — CLOTRIMAZOLE AND BETAMETHASONE DIPROPIONATE 10; .64 MG/G; MG/G
CREAM TOPICAL
COMMUNITY
Start: 2017-10-01 | End: 2017-12-22

## 2017-10-30 RX ORDER — PANTOPRAZOLE SODIUM 40 MG/1
40 TABLET, DELAYED RELEASE ORAL DAILY
COMMUNITY
Start: 2017-09-25 | End: 2018-08-10 | Stop reason: SDUPTHER

## 2017-12-22 ENCOUNTER — LAB (OUTPATIENT)
Dept: LAB | Facility: HOSPITAL | Age: 52
End: 2017-12-22

## 2017-12-22 ENCOUNTER — CONSULT (OUTPATIENT)
Dept: CARDIOLOGY | Facility: CLINIC | Age: 52
End: 2017-12-22

## 2017-12-22 VITALS
DIASTOLIC BLOOD PRESSURE: 68 MMHG | WEIGHT: 187 LBS | SYSTOLIC BLOOD PRESSURE: 98 MMHG | BODY MASS INDEX: 31.16 KG/M2 | HEART RATE: 53 BPM | HEIGHT: 65 IN

## 2017-12-22 DIAGNOSIS — G47.33 OBSTRUCTIVE SLEEP APNEA SYNDROME: ICD-10-CM

## 2017-12-22 DIAGNOSIS — E78.5 DYSLIPIDEMIA: ICD-10-CM

## 2017-12-22 DIAGNOSIS — R00.1 SINUS BRADYCARDIA: Primary | ICD-10-CM

## 2017-12-22 DIAGNOSIS — R00.1 SINUS BRADYCARDIA: ICD-10-CM

## 2017-12-22 LAB
ANION GAP SERPL CALCULATED.3IONS-SCNC: 7 MMOL/L (ref 3–11)
BUN BLD-MCNC: 15 MG/DL (ref 9–23)
BUN/CREAT SERPL: 21.4 (ref 7–25)
CALCIUM SPEC-SCNC: 9.4 MG/DL (ref 8.7–10.4)
CHLORIDE SERPL-SCNC: 102 MMOL/L (ref 99–109)
CO2 SERPL-SCNC: 31 MMOL/L (ref 20–31)
CREAT BLD-MCNC: 0.7 MG/DL (ref 0.6–1.3)
DEPRECATED FTI SERPL-MCNC: 5.3 TBI
DEPRECATED RDW RBC AUTO: 41.6 FL (ref 37–54)
ERYTHROCYTE [DISTWIDTH] IN BLOOD BY AUTOMATED COUNT: 13.3 % (ref 11.3–14.5)
GFR SERPL CREATININE-BSD FRML MDRD: 88 ML/MIN/1.73
GLUCOSE BLD-MCNC: 75 MG/DL (ref 70–100)
HCT VFR BLD AUTO: 43.3 % (ref 34.5–44)
HGB BLD-MCNC: 14.2 G/DL (ref 11.5–15.5)
MCH RBC QN AUTO: 28.2 PG (ref 27–31)
MCHC RBC AUTO-ENTMCNC: 32.8 G/DL (ref 32–36)
MCV RBC AUTO: 85.9 FL (ref 80–99)
PLATELET # BLD AUTO: 185 10*3/MM3 (ref 150–450)
PMV BLD AUTO: 10.5 FL (ref 6–12)
POTASSIUM BLD-SCNC: 4.2 MMOL/L (ref 3.5–5.5)
RBC # BLD AUTO: 5.04 10*6/MM3 (ref 3.89–5.14)
SODIUM BLD-SCNC: 140 MMOL/L (ref 132–146)
T3RU NFR SERPL: 35.3 % (ref 23–37)
T4 SERPL-MCNC: 15 MCG/DL (ref 4.7–11.4)
TSH SERPL DL<=0.05 MIU/L-ACNC: 0.04 MIU/ML (ref 0.35–5.35)
WBC NRBC COR # BLD: 4.83 10*3/MM3 (ref 3.5–10.8)

## 2017-12-22 PROCEDURE — 85027 COMPLETE CBC AUTOMATED: CPT | Performed by: PHYSICIAN ASSISTANT

## 2017-12-22 PROCEDURE — 84479 ASSAY OF THYROID (T3 OR T4): CPT

## 2017-12-22 PROCEDURE — 36415 COLL VENOUS BLD VENIPUNCTURE: CPT | Performed by: INTERNAL MEDICINE

## 2017-12-22 PROCEDURE — 93000 ELECTROCARDIOGRAM COMPLETE: CPT | Performed by: INTERNAL MEDICINE

## 2017-12-22 PROCEDURE — 84443 ASSAY THYROID STIM HORMONE: CPT

## 2017-12-22 PROCEDURE — 84436 ASSAY OF TOTAL THYROXINE: CPT

## 2017-12-22 PROCEDURE — 99244 OFF/OP CNSLTJ NEW/EST MOD 40: CPT | Performed by: INTERNAL MEDICINE

## 2017-12-22 PROCEDURE — 80048 BASIC METABOLIC PNL TOTAL CA: CPT | Performed by: PHYSICIAN ASSISTANT

## 2017-12-22 NOTE — PROGRESS NOTES
[facility]  Consultation H&P  Liliana Quevedo  1965    [phone]  VISIT DATE:  2017    PCP: Geremias Ramesh MD  [address] [place] [address] [address]  [facility] [id number]    IDENTIFICATION: A 52 y.o. female from [place], [place]    CC:  Chief Complaint   Patient presents with   • Dizziness   • low heart rate       PROBLEM LIST:  1. HLD  2. Hypothyroidism  3. ABDULLAHI  1. On CPAP  4. GERD  5. Depression/Anxiety  6. , nl pregnancies   7. Surgical Hx:  1. Knee surgery  2. Carpal tunnel release  3. Cholecystectomy  4. Ankle arthroscopy/surgery  5. Rhinoplasty  6. Gastric sleeve   7. Hysterectomy    Allergies  Allergies   Allergen Reactions   • Ampicillin Hives   • Sulfa Antibiotics Hives     Hot and cold flashes, N&V, diarrhea       Current Medications    Current Outpatient Prescriptions:   •  citalopram (CeleXA) 40 MG tablet, Take 1 tablet by mouth Daily., Disp: 30 tablet, Rfl: 6  •  levothyroxine (SYNTHROID, LEVOTHROID) 150 MCG tablet, Take 150 mcg by mouth Daily., Disp: , Rfl:   •  omega-3 acid ethyl esters (LOVAZA) 1 G capsule, Take 4 g by mouth Every Night., Disp: , Rfl:   •  pantoprazole (PROTONIX) 40 MG EC tablet, Daily., Disp: , Rfl:   •  pravastatin (PRAVACHOL) 20 MG tablet, Take 20 mg by mouth Every Night., Disp: , Rfl:      History of Present Illness   HPI  This is a 52 year old  female with the above mentioned PMH who presents for consult from OBGYN Dr. Castaneda for evaluation of cardiac clearance for colporrhaphy. She is scheduled for her surgery next .     She was found to be bradycardic upon EKG at her preop OBGYN visit. Her rate at that time was 46 bpm. Her HR has historically been in the low 60s/high 50s. Today it was 53. ECG is otherwise normal.    Pt has been on pravastatin for several years with acceptable lipids. Her BP is usually low/normal. She stays hydrated. Occasional dizzy spells with sudden movements that do not result in syncope. She had  gastric sleeve in February and has since lost about 100 pounds.  She is feeling well. She denies any chest pain, dyspnea at rest, dyspnea on exertion, orthopnea, PND, palpitations, lower extremity edema, or claudication. Pt denies history of CHF, DVT, PE, MI, CVA, TIA, or rheumatic fever. Father has afib and chf.       ROS  Review of Systems   Constitution: Positive for malaise/fatigue.   Respiratory: Positive for sleep disturbances due to breathing and snoring.    Musculoskeletal: Positive for arthritis and myalgias.   Gastrointestinal: Positive for heartburn.   Neurological: Positive for dizziness and headaches.   All other systems reviewed and are negative.      SOCIAL HX  Social History     Social History   • Marital status:      Spouse name: N/A   • Number of children: N/A   • Years of education: N/A     Occupational History   • Not on file.     Social History Main Topics   • Smoking status: Never Smoker   • Smokeless tobacco: Never Used   • Alcohol use No   • Drug use: No   • Sexual activity: Not on file     Other Topics Concern   • Not on file     Social History Narrative    Lives in [place] w/ and inlaws    Working full time @[employer]        FAMILY HX  Family History   Problem Relation Age of Onset   • Cancer Other      COLON CANCER   • Arthritis Other    • Sleep apnea Other    • Obesity Mother    • Diabetes Mother    • Hypertension Mother    • Cancer Mother    • Sleep apnea Mother    • Breast cancer Maternal Aunt      late 50's   • Obesity Father    • Obesity Sister    • Ovarian cancer Sister 47   • Obesity Brother    • Diabetes Brother    • Hypertension Brother    • Obesity Maternal Grandmother    • Diabetes Maternal Grandmother    • Hypertension Maternal Grandmother    • Heart attack Maternal Grandmother    • Sleep apnea Maternal Grandmother    • Cancer Maternal Grandmother        Vitals:    12/22/17 0958   BP: 98/68   BP Location: Right arm   Patient Position: Sitting   Pulse:  "53   Weight: 84.8 kg (187 lb)   Height: 165.1 cm (65\")       PHYSICAL EXAMINATION:  Physical Exam   Constitutional: She is oriented to person, place, and time. She appears well-developed and well-nourished. No distress.   obese   HENT:   Head: Normocephalic and atraumatic.   Right Ear: External ear normal.   Left Ear: External ear normal.   Nose: Nose normal.   Eyes: Conjunctivae and EOM are normal.   Neck: Neck supple. No hepatojugular reflux and no JVD present. Carotid bruit is not present. No thyromegaly present.   Cardiovascular: Regular rhythm, S1 normal, S2 normal, normal heart sounds, intact distal pulses and normal pulses.  Bradycardia present.  Exam reveals no gallop, no distant heart sounds and no midsystolic click.    No murmur heard.  Pulses:       Radial pulses are 2+ on the right side, and 2+ on the left side.        Dorsalis pedis pulses are 2+ on the right side, and 2+ on the left side.        Posterior tibial pulses are 2+ on the right side, and 2+ on the left side.   Pulmonary/Chest: Effort normal and breath sounds normal. No respiratory distress. She has no decreased breath sounds. She has no wheezes. She has no rhonchi. She has no rales.   Abdominal: Soft. Bowel sounds are normal. There is no hepatosplenomegaly. There is no tenderness.   Musculoskeletal: Normal range of motion. She exhibits no edema.   Neurological: She is alert and oriented to person, place, and time.   No focal deficits.   Skin: Skin is warm and dry. No erythema.   Psychiatric: She has a normal mood and affect. Thought content normal.   Nursing note and vitals reviewed.      Diagnostic Data:    ECG 12 Lead  Date/Time: 12/22/2017 10:19 AM  Performed by: GALILEO SEARS  Authorized by: GALILEO SEARS   Rhythm: sinus rhythm  BPM: 53  Clinical impression: non-specific ECG  Comments: Nl except for rate          Lab Results   Component Value Date    TRIG 70 07/28/2017    HDL 45 07/28/2017    LDLDIRECT 52 07/28/2017     Lab Results "   Component Value Date    GLUCOSE 120 (H) 02/23/2017    BUN 19 08/22/2017    CREATININE 0.70 08/22/2017     08/22/2017    K 4.6 08/22/2017     08/22/2017    CO2 30.0 08/22/2017     Lab Results   Component Value Date    HGBA1C 5.40 02/20/2017     Lab Results   Component Value Date    WBC 4.29 08/22/2017    HGB 13.3 08/22/2017    HCT 41.1 08/22/2017     08/22/2017       ASSESSMENT:   Diagnosis Plan   1. Sinus bradycardia     2. Dyslipidemia     3. Obstructive sleep apnea syndrome         PLAN:  We will evaluate laboratory today to check thyroid as it is been some time since this is been checked.  Otherwise, sinus bradycardia due to vagal simulation following gastric sleeve and is very common.  Patient is asymptomatic.  Patient would be low risk or gynecological surgery as well as labs come back acceptable.    Continue statin therapy    Scribed for Kurt Bower MD by Susan Adamson PA-C. 12/22/2017  10:37 AM  I, Kurt Bower MD, personally performed the services described in this documentation as scribed by the above named individual in my presence, and it is both accurate and complete.  12/22/2017  11:16 AM    Kurt Bower MD, [facility]

## 2018-02-07 ENCOUNTER — OFFICE VISIT (OUTPATIENT)
Dept: INTERNAL MEDICINE | Facility: CLINIC | Age: 53
End: 2018-02-07

## 2018-02-07 VITALS
SYSTOLIC BLOOD PRESSURE: 112 MMHG | BODY MASS INDEX: 31.62 KG/M2 | TEMPERATURE: 97.9 F | DIASTOLIC BLOOD PRESSURE: 82 MMHG | WEIGHT: 190 LBS | RESPIRATION RATE: 16 BRPM | HEART RATE: 68 BPM

## 2018-02-07 DIAGNOSIS — F33.0 MILD EPISODE OF RECURRENT MAJOR DEPRESSIVE DISORDER (HCC): Primary | ICD-10-CM

## 2018-02-07 DIAGNOSIS — Z00.00 HEALTH CARE MAINTENANCE: ICD-10-CM

## 2018-02-07 PROCEDURE — 90472 IMMUNIZATION ADMIN EACH ADD: CPT | Performed by: INTERNAL MEDICINE

## 2018-02-07 PROCEDURE — 99213 OFFICE O/P EST LOW 20 MIN: CPT | Performed by: INTERNAL MEDICINE

## 2018-02-07 PROCEDURE — 90471 IMMUNIZATION ADMIN: CPT | Performed by: INTERNAL MEDICINE

## 2018-02-07 PROCEDURE — 90715 TDAP VACCINE 7 YRS/> IM: CPT | Performed by: INTERNAL MEDICINE

## 2018-02-07 PROCEDURE — 90632 HEPA VACCINE ADULT IM: CPT | Performed by: INTERNAL MEDICINE

## 2018-02-07 RX ORDER — CONJUGATED ESTROGENS 0.62 MG/G
1 CREAM VAGINAL TAKE AS DIRECTED
COMMUNITY
Start: 2017-12-28 | End: 2018-06-26

## 2018-02-07 NOTE — PROGRESS NOTES
Subjective   Liliana Quevedo is a 52 y.o. female.     History of Present Illness     1 depression -chronic and stable.  Patient denies any recent exacerbations of her depression and currently she is mentally doing very well.  Patient denies any hallucinations, worsening depression, no suicidal ideations, no other concerns at this time.      Review of Systems   All other systems reviewed and are negative.      Objective   Physical Exam   Constitutional: She appears well-developed and well-nourished.   HENT:   Head: Normocephalic.   Right Ear: External ear normal.   Left Ear: External ear normal.   Nose: Nose normal.   Mouth/Throat: Oropharynx is clear and moist.   Eyes: Conjunctivae are normal. Pupils are equal, round, and reactive to light.   Neck: Normal range of motion. Neck supple.   Cardiovascular: Normal rate, regular rhythm and normal heart sounds.    Pulmonary/Chest: Effort normal and breath sounds normal.   Nursing note and vitals reviewed.      Assessment/Plan   Liliana was seen today for follow-up.    Diagnoses and all orders for this visit:    Mild episode of recurrent major depressive disorder    Health care maintenance  -     Hepatitis A Vaccine Adult IM  -     Tdap Vaccine Greater Than or Equal To 6yo IM    Continue on current blood pressure medications.

## 2018-02-21 ENCOUNTER — OFFICE VISIT (OUTPATIENT)
Dept: BARIATRICS/WEIGHT MGMT | Facility: CLINIC | Age: 53
End: 2018-02-21

## 2018-02-21 VITALS
WEIGHT: 185.01 LBS | TEMPERATURE: 97.8 F | HEART RATE: 57 BPM | HEIGHT: 65 IN | SYSTOLIC BLOOD PRESSURE: 94 MMHG | DIASTOLIC BLOOD PRESSURE: 60 MMHG | RESPIRATION RATE: 18 BRPM | OXYGEN SATURATION: 99 % | BODY MASS INDEX: 30.82 KG/M2

## 2018-02-21 DIAGNOSIS — E66.9 OBESITY, CLASS I, BMI 30-34.9: ICD-10-CM

## 2018-02-21 DIAGNOSIS — Z98.84 STATUS POST BARIATRIC SURGERY: Primary | ICD-10-CM

## 2018-02-21 DIAGNOSIS — R53.83 FATIGUE, UNSPECIFIED TYPE: ICD-10-CM

## 2018-02-21 PROCEDURE — 99214 OFFICE O/P EST MOD 30 MIN: CPT | Performed by: PHYSICIAN ASSISTANT

## 2018-02-21 NOTE — PROGRESS NOTES
"[facility]  [address]  [facility] [id number] [phone]        Patient Name:  Liliana Quevedo.  :  1965      Date of Visit: 2018      Reason for Visit:   1 year postop      HPI: Liliana Quevedo is a 52 y.o. female s/p LSG by GDW on 17    Doing well.  No issues/concerns.  Denies dysphagia, reflux, nausea, vomiting and abdominal pain. Eats 3 meals a day, healthy food choices - protein, then veggies, then fruit, eats salads.  Getting 80-90g prot/day, typically w/ a protein shake in the AMs.  Drinking a lot of water, 64oz/day. Unsure how many calories a day.  6 month labs revealed low TSH, bariatric levels looked fine. PCP adjusted thyroid medication.  Taking MVI, B12, B1, Calcium, Vit D, iron and Vit C daily.  On Pantoprazole, controls reflux. Plans to start exercising again. Had bladder sling 6 weeks ago, just recovered from this.      Presurgery weight: 264 pounds.  Today's weight is 83.9 kg (185 lb 0.2 oz) pounds, today's  Body mass index is 30.79 kg/(m^2)., and her weight loss since surgery is 79 pounds.  Hoping to reach 160 lbs.    Past Medical History:   Diagnosis Date   • Acute sinusitis     Assessed By: Geremias Ramesh (Internal Medicine); Last Assessed: 2015   • Allergic rhinitis    • Carpal tunnel syndrome    • Colon polyp     SIGMOID   • DDD (degenerative disc disease), lumbar    • De Quervain's tenosynovitis    • Depression    • Edema     Assessed By: Geremias Ramesh (Internal Medicine); Last Assessed: 2014   • Elevated alkaline phosphatase level     102   • Fatigue    • Gastric ulcer    • GERD (gastroesophageal reflux disease)     on Protonix.  Says sx's not bad even if misses meds, \"depends on what I eat\".  EGD GDW  40 cm, path DE reflux.  serum h. pyl neg   • Hematuria    • Hyperlipidemia    • Hypertension     per prim MD note   • Hypothyroidism     w/ hx thyromegaly s/p CASTILLO.    • Incomplete right bundle branch block  "   • Low back pain    • Mild cardiomegaly    • Morbid obesity    • Nephrolithiasis    • Osteoarthritis    • Peripheral edema    • Polyp of sigmoid colon    • Scoliosis    • Sleep apnea     CPAP compliant   • Tendinitis    • Viral URI     Assessed By: Geremias Ramesh (Internal Medicine); Last Assessed: 23 Jan 2015   • Vitamin D deficiency    • Weight gain     Assessed By: Geremias Ramesh (Internal Medicine); Last Assessed: 26 Nov 2014     Past Surgical History:   Procedure Laterality Date   • ANKLE SURGERY Right 2000    dislocation repair w/ plate    • CARPAL TUNNEL RELEASE Right    • COLONOSCOPY  2011    screening   • CYST REMOVAL Right     right index finger   • GASTRIC SLEEVE LAPAROSCOPIC N/A 2/22/2017    Procedure: GASTRIC SLEEVE LAPAROSCOPIC;  Surgeon: Sergio Gibbons MD;  Location: [facility];  Service:    • HYSTERECTOMY Bilateral 2004    (open) w/ oophorectomy - uterine fibroids and ovarian cysts   • KNEE SURGERY Left 2015     ligament repair   • LAPAROSCOPIC CHOLECYSTECTOMY  2011    for stones   • NOSE SURGERY     • OOPHORECTOMY       Outpatient Prescriptions Marked as Taking for the 2/21/18 encounter (Office Visit) with Juani Gonzalez PA-C   Medication Sig Dispense Refill   • citalopram (CeleXA) 40 MG tablet Take 1 tablet by mouth Daily. 30 tablet 6   • levothyroxine (SYNTHROID, LEVOTHROID) 150 MCG tablet Take 150 mcg by mouth Daily.     • omega-3 acid ethyl esters (LOVAZA) 1 G capsule Take 4 g by mouth Every Night.     • pantoprazole (PROTONIX) 40 MG EC tablet Daily.     • pravastatin (PRAVACHOL) 20 MG tablet Take 20 mg by mouth Every Night.     • PREMARIN 0.625 MG/GM vaginal cream Apply 1 application topically Take As Directed.         Allergies   Allergen Reactions   • Ampicillin Hives   • Sulfa Antibiotics Hives     Hot and cold flashes, N&V, diarrhea       Social History     Social History   • Marital status:      Spouse name: N/A   • Number of children: N/A   • Years of education: N/A  "    Occupational History   • Not on file.     Social History Main Topics   • Smoking status: Never Smoker   • Smokeless tobacco: Never Used   • Alcohol use No   • Drug use: No   • Sexual activity: Not on file     Other Topics Concern   • Not on file     Social History Narrative    Lives in [place] w/ and inlaws    Working full time @[employer]        BP 94/60 (BP Location: Left arm, Patient Position: Sitting, Cuff Size: Large Adult)  Pulse 57  Temp 97.8 °F (36.6 °C) (Temporal Artery )   Resp 18  Ht 165.1 cm (65\")  Wt 83.9 kg (185 lb 0.2 oz)  SpO2 99%  BMI 30.79 kg/m2    Physical Exam   Constitutional: She is oriented to person, place, and time. She appears well-developed and well-nourished. She is cooperative.   HENT:   Head: Normocephalic and atraumatic.   Mouth/Throat: Mucous membranes are normal.   Eyes: No scleral icterus.   Cardiovascular: Normal rate, regular rhythm and normal heart sounds.    Pulmonary/Chest: Effort normal and breath sounds normal. No respiratory distress.   Abdominal: Soft. Bowel sounds are normal. There is no tenderness.   Incisions well healed   Musculoskeletal: Normal range of motion. She exhibits no edema.   Neurological: She is alert and oriented to person, place, and time.   Skin: Skin is warm and dry. No rash noted.   Psychiatric: She has a normal mood and affect. Judgment normal.         Assessment: 1 year  s/p LSG by WEN on 2/22/17    ICD-10-CM ICD-9-CM   1. Status post bariatric surgery Z98.84 V45.86   2. Obesity, Class I, BMI 30-34.9 E66.9 278.00   3. Fatigue, unspecified type R53.83 780.79         Plan:  Continue w/ good food choices and healthy habits.  Continue to focus on high protein, low carb.  Keep tracking intake. Recommend protein toward 100g. Start tracking calories, target calories 1200. Will meet with dietician today.  Continue routine exercise.  Routine bariatric labs ordered including TSH per patient request.  Continue vitamins w/ adjustments " pending lab results, may d/c some vitamins if appropriate.  Call w/ problems/concerns.     The patient was instructed to follow up in 6 months, sooner if needed.    note: approx 15 of the 25 minute visit was spent counseling on nutrition and necessary dietary/lifestyle modifications.

## 2018-03-01 LAB
25(OH)D3+25(OH)D2 SERPL-MCNC: 40.6 NG/ML
A-TOCOPHEROL VIT E SERPL-MCNC: 5.8 MG/L (ref 5.3–16.8)
ALBUMIN SERPL-MCNC: 4.2 G/DL (ref 3.2–4.8)
ALBUMIN/GLOB SERPL: 1.6 G/DL (ref 1.5–2.5)
ALP SERPL-CCNC: 146 U/L (ref 25–100)
ALT SERPL-CCNC: 26 U/L (ref 7–40)
AST SERPL-CCNC: 28 U/L (ref 0–33)
BASOPHILS # BLD AUTO: 0 10*3/MM3 (ref 0–0.2)
BASOPHILS NFR BLD AUTO: 0 % (ref 0–1)
BILIRUB SERPL-MCNC: 0.6 MG/DL (ref 0.3–1.2)
BUN SERPL-MCNC: 13 MG/DL (ref 9–23)
BUN/CREAT SERPL: 18.6 (ref 7–25)
CALCIUM SERPL-MCNC: 9 MG/DL (ref 8.7–10.4)
CHLORIDE SERPL-SCNC: 105 MMOL/L (ref 99–109)
CO2 SERPL-SCNC: 30 MMOL/L (ref 20–31)
CREAT SERPL-MCNC: 0.7 MG/DL (ref 0.6–1.3)
EOSINOPHIL # BLD AUTO: 0.05 10*3/MM3 (ref 0–0.3)
EOSINOPHIL NFR BLD AUTO: 2.1 % (ref 0–3)
ERYTHROCYTE [DISTWIDTH] IN BLOOD BY AUTOMATED COUNT: 13.9 % (ref 11.3–14.5)
FERRITIN SERPL-MCNC: 231 NG/ML (ref 10–291)
FOLATE SERPL-MCNC: >24 NG/ML (ref 3.2–20)
GFR SERPLBLD CREATININE-BSD FMLA CKD-EPI: 107 ML/MIN/1.73
GFR SERPLBLD CREATININE-BSD FMLA CKD-EPI: 88 ML/MIN/1.73
GLOBULIN SER CALC-MCNC: 2.6 GM/DL
GLUCOSE SERPL-MCNC: 76 MG/DL (ref 70–100)
HCT VFR BLD AUTO: 42.2 % (ref 34.5–44)
HGB BLD-MCNC: 13.5 G/DL (ref 11.5–15.5)
IMM GRANULOCYTES # BLD: 0 10*3/MM3 (ref 0–0.03)
IMM GRANULOCYTES NFR BLD: 0 % (ref 0–0.6)
IRON SERPL-MCNC: 29 MCG/DL (ref 50–175)
LYMPHOCYTES # BLD AUTO: 0.96 10*3/MM3 (ref 0.6–4.8)
LYMPHOCYTES NFR BLD AUTO: 41.2 % (ref 24–44)
MAGNESIUM SERPL-MCNC: 2.3 MG/DL (ref 1.3–2.7)
MCH RBC QN AUTO: 28.4 PG (ref 27–31)
MCHC RBC AUTO-ENTMCNC: 32 G/DL (ref 32–36)
MCV RBC AUTO: 88.7 FL (ref 80–99)
METHYLMALONATE SERPL-SCNC: 358 NMOL/L (ref 0–378)
MONOCYTES # BLD AUTO: 0.34 10*3/MM3 (ref 0–1)
MONOCYTES NFR BLD AUTO: 14.6 % (ref 0–12)
NEUTROPHILS # BLD AUTO: 0.98 10*3/MM3 (ref 1.5–8.3)
NEUTROPHILS NFR BLD AUTO: 42.1 % (ref 41–71)
PHOSPHATE SERPL-MCNC: 3.9 MG/DL (ref 2.4–5.1)
PLATELET # BLD AUTO: 159 10*3/MM3 (ref 150–450)
POTASSIUM SERPL-SCNC: 4.3 MMOL/L (ref 3.5–5.5)
PREALB SERPL-MCNC: 14 MG/DL (ref 10–36)
PROT SERPL-MCNC: 6.8 G/DL (ref 5.7–8.2)
PTH-INTACT SERPL-MCNC: 33 PG/ML (ref 15–65)
RBC # BLD AUTO: 4.76 10*6/MM3 (ref 3.89–5.14)
SODIUM SERPL-SCNC: 141 MMOL/L (ref 132–146)
VIT A SERPL-MCNC: 24 UG/DL (ref 20–65)
VIT B1 BLD-SCNC: 135 NMOL/L (ref 66.5–200)
WBC # BLD AUTO: 2.33 10*3/MM3 (ref 3.5–10.8)
ZINC SERPL-MCNC: 60 UG/DL (ref 56–134)

## 2018-03-23 DIAGNOSIS — F33.0 MILD EPISODE OF RECURRENT MAJOR DEPRESSIVE DISORDER (HCC): ICD-10-CM

## 2018-03-23 RX ORDER — CITALOPRAM 40 MG/1
TABLET ORAL
Qty: 30 TABLET | Refills: 5 | Status: SHIPPED | OUTPATIENT
Start: 2018-03-23 | End: 2018-09-21 | Stop reason: SDUPTHER

## 2018-03-27 RX ORDER — CYANOCOBALAMIN 1000 UG/ML
INJECTION, SOLUTION INTRAMUSCULAR; SUBCUTANEOUS
Refills: 0 | OUTPATIENT
Start: 2018-03-27

## 2018-06-05 ENCOUNTER — OFFICE VISIT (OUTPATIENT)
Dept: INTERNAL MEDICINE | Facility: CLINIC | Age: 53
End: 2018-06-05

## 2018-06-05 VITALS
WEIGHT: 194.8 LBS | RESPIRATION RATE: 18 BRPM | BODY MASS INDEX: 32.42 KG/M2 | HEART RATE: 88 BPM | SYSTOLIC BLOOD PRESSURE: 110 MMHG | DIASTOLIC BLOOD PRESSURE: 64 MMHG | TEMPERATURE: 97.9 F

## 2018-06-05 DIAGNOSIS — J32.9 SINUSITIS, UNSPECIFIED CHRONICITY, UNSPECIFIED LOCATION: ICD-10-CM

## 2018-06-05 DIAGNOSIS — J30.9 ALLERGIC RHINITIS, UNSPECIFIED CHRONICITY, UNSPECIFIED SEASONALITY, UNSPECIFIED TRIGGER: Primary | ICD-10-CM

## 2018-06-05 PROCEDURE — 99213 OFFICE O/P EST LOW 20 MIN: CPT | Performed by: NURSE PRACTITIONER

## 2018-06-05 RX ORDER — AZITHROMYCIN 250 MG/1
TABLET, FILM COATED ORAL
Qty: 6 TABLET | Refills: 0 | Status: SHIPPED | OUTPATIENT
Start: 2018-06-05 | End: 2018-06-26

## 2018-06-05 RX ORDER — MONTELUKAST SODIUM 10 MG/1
10 TABLET ORAL NIGHTLY
Qty: 30 TABLET | Refills: 5 | Status: SHIPPED | OUTPATIENT
Start: 2018-06-05 | End: 2018-12-11 | Stop reason: SDUPTHER

## 2018-06-05 NOTE — PROGRESS NOTES
Chief Complaint   Patient presents with   • Allergies     x2 weeks        Subjective     History of Present Illness   Patient is here today with complaints of allergy symptoms for 2 weeks.  She's tried over-the-counter medications without changes in her symptoms.  Her ears feel full her nose is now hurting she is having new deep congested cough.  She's had no fevers no facial pain.    The following portions of the patient's history were reviewed and updated as appropriate: allergies, current medications, past family history, past medical history, past social history, past surgical history and problem list.    Review of Systems   HENT: Positive for congestion, postnasal drip, rhinorrhea, sinus pressure and sneezing.    Respiratory: Positive for cough.    All other systems reviewed and are negative.      Objective   Physical Exam   Constitutional: She is oriented to person, place, and time. She appears well-developed and well-nourished.   HENT:   Head: Normocephalic and atraumatic.   Bilateral trace fluid bilaterally nasal mucosa boggy tenderness palpated bilateral maxillary sinuses clear postnasal drainage no erythema edema or exudate   Neck: No thyromegaly present.   Cardiovascular: Normal rate, regular rhythm and intact distal pulses.    Pulmonary/Chest: Effort normal and breath sounds normal.   Abdominal: Soft. Bowel sounds are normal. She exhibits no distension. There is no tenderness.   Musculoskeletal: She exhibits no edema.   Lymphadenopathy:     She has no cervical adenopathy.   Neurological: She is alert and oriented to person, place, and time.   Skin: Capillary refill takes 2 to 3 seconds.   Psychiatric: She has a normal mood and affect. Her behavior is normal.   Nursing note and vitals reviewed.      Assessment/Plan   Liliana was seen today for allergies.    Diagnoses and all orders for this visit:    Allergic rhinitis, unspecified chronicity, unspecified seasonality, unspecified trigger  -     montelukast  (SINGULAIR) 10 MG tablet; Take 1 tablet by mouth Every Night.    Sinusitis, unspecified chronicity, unspecified location  -     azithromycin (ZITHROMAX Z-PHIL) 250 MG tablet; Take 2 tablets the first day, then 1 tablet daily for 4 days.      Return if symptoms worsen or fail to improve.  RTC/call  If symptoms worsen  Meds MOA and SE's reviewed and pt v/u

## 2018-06-26 ENCOUNTER — OFFICE VISIT (OUTPATIENT)
Dept: CARDIOLOGY | Facility: HOSPITAL | Age: 53
End: 2018-06-26

## 2018-06-26 VITALS
SYSTOLIC BLOOD PRESSURE: 92 MMHG | OXYGEN SATURATION: 99 % | WEIGHT: 197 LBS | BODY MASS INDEX: 32.82 KG/M2 | DIASTOLIC BLOOD PRESSURE: 56 MMHG | RESPIRATION RATE: 18 BRPM | TEMPERATURE: 97.7 F | HEART RATE: 58 BPM | HEIGHT: 65 IN

## 2018-06-26 DIAGNOSIS — G47.33 OBSTRUCTIVE SLEEP APNEA SYNDROME: ICD-10-CM

## 2018-06-26 DIAGNOSIS — E78.00 PURE HYPERCHOLESTEROLEMIA: ICD-10-CM

## 2018-06-26 DIAGNOSIS — R42 DIZZINESS: ICD-10-CM

## 2018-06-26 DIAGNOSIS — R00.1 SINUS BRADYCARDIA: ICD-10-CM

## 2018-06-26 DIAGNOSIS — R00.2 PALPITATIONS: Primary | ICD-10-CM

## 2018-06-26 DIAGNOSIS — R55 NEAR SYNCOPE: ICD-10-CM

## 2018-06-26 PROCEDURE — 99214 OFFICE O/P EST MOD 30 MIN: CPT | Performed by: NURSE PRACTITIONER

## 2018-06-26 NOTE — PROGRESS NOTES
[facility]    Encounter Date:2018     Liliana Quevedo  [address] [facility] [address]  [phone]    1965    Geremias Ramesh MD    Liliana Quevedo is a 52 y.o. female.      Subjective:     Chief Complaint:  Establish Care (Low BP & Heart rate )       HPI :  Ms. Quevedo comes in to the [facility] today at the request of Dr. Castaneda due to symptoms of low HR, low BP and dizziness.      Patient has previously been evaluated for these symptoms per Dr. Kurt Bower 17, but these symptoms were again noted @ GYN check up on 18 and follow up was requested.      Patient states she began to have feelings of dizziness, palpitations, SOA, light headedness around March maybe once every 2 weeks.  Now, symptoms occur about twice a week for as much as 2 hours.  Symptoms can come on suddenly w/o warning she she feels she has to sit down or will pass out.  She is s/p gastric sleeve 17 months ago and has lost approximately 100 lbs.      Fluid intake is water 4-5 sixteen ounce bottles of water per day.  She is active and takes care of father and grandson during the summer.  During the fall, she works as school .  Both parents have CHF.  Father has afib.  Mother  age 58 r/t cancer but had heart disease, copd, and diabetes.        Cardiac risk factors: dyslipidemia, obesity (BMI 32),   History of CVD, dyslipidemia, hypertension, diabetes.  Tobacco use, sedentary lifestyle, obesity (BMI > 30), gender, age (>50)  Family history of premature coronary artery disease (male < 55 yrs, female <66 yrs)    Allergies   Allergen Reactions   • Ampicillin Hives   • Bactrim [Sulfamethoxazole-Trimethoprim] Hives and GI Intolerance   • Sulfa Antibiotics Hives     Hot and cold flashes, N&V, diarrhea         Current Outpatient Prescriptions:   •  citalopram (CeleXA) 40 MG tablet, TAKE ONE TABLET BY MOUTH DAILY, Disp: 30 tablet, Rfl: 5  •  levothyroxine  "(SYNTHROID, LEVOTHROID) 150 MCG tablet, Take 150 mcg by mouth Daily., Disp: , Rfl:   •  montelukast (SINGULAIR) 10 MG tablet, Take 1 tablet by mouth Every Night., Disp: 30 tablet, Rfl: 5  •  omega-3 acid ethyl esters (LOVAZA) 1 G capsule, Take 4 g by mouth Every Night., Disp: , Rfl:   •  pantoprazole (PROTONIX) 40 MG EC tablet, Take 40 mg by mouth Daily., Disp: , Rfl:   •  pravastatin (PRAVACHOL) 20 MG tablet, Take 20 mg by mouth Every Night., Disp: , Rfl:     The following portions of the patient's history were reviewed and updated as appropriate in Epic:  Problem list, allergies, current medications, past medical and surgical history, past social and family history.     Review of Systems   Constitution: Positive for weakness, malaise/fatigue and weight loss.   HENT: Negative.    Eyes: Negative.    Cardiovascular: Positive for irregular heartbeat and near-syncope.   Respiratory: Positive for shortness of breath.    Endocrine: Negative.    Hematologic/Lymphatic: Negative.    Skin: Negative.    Musculoskeletal: Negative.    Gastrointestinal: Negative.    Genitourinary: Negative.    Neurological: Positive for dizziness, light-headedness and loss of balance.   Psychiatric/Behavioral: Negative.    Allergic/Immunologic: Negative.        Objective:     Vitals:    06/26/18 1512 06/26/18 1514 06/26/18 1515   BP: 92/54 93/52 92/56   BP Location: Left arm  Right arm   Patient Position: Sitting  Standing   Pulse: (!) 49 (!) 48 58   Resp: 18     Temp: 97.7 °F (36.5 °C)     TempSrc: Temporal Artery      SpO2: 99%     Weight: 89.4 kg (197 lb)     Height: 165.1 cm (65\")           Physical Exam   Constitutional: She is oriented to person, place, and time. She appears well-developed and well-nourished. No distress.   HENT:   Head: Normocephalic and atraumatic.   Eyes: Conjunctivae are normal. Pupils are equal, round, and reactive to light. No scleral icterus.   Neck: Neck supple. No JVD present.   Cardiovascular: Normal rate, " regular rhythm and intact distal pulses.  Exam reveals no gallop and no friction rub.    Murmur heard.  I/VI systolic murmur RUSB   Pulmonary/Chest: Effort normal and breath sounds normal. No respiratory distress. She has no wheezes. She has no rales. She exhibits no tenderness.   Musculoskeletal: Normal range of motion. She exhibits no edema.   Neurological: She is alert and oriented to person, place, and time. No cranial nerve deficit.   Skin: Skin is warm and dry.   Psychiatric: She has a normal mood and affect. Her behavior is normal. Thought content normal.       Lab and Diagnostic Review: records from Dr. Castaneda and echo from Dr. Patrick 7/5/16    Assessment and Plan:     1. Symptomatic sinus bradycardia  - Symptoms may also be atypical anginal presentation  - No rate altering medications or antihypertensives  - CAD risk factors noted above.   - Stress Test With Myocardial Perfusion (1 Day); Future  - Holter Monitor - 72 Hour Up To 21 Days; Future    2. Pure hypercholesterolemia  - Pravastatin 20 mg QPM    3. Obstructive sleep apnea syndrome  - States compliance with CPAP    4. Dizziness/ Near syncope  - symptoms occurring more frequently and with greater severity  - Echo report less than one year ago reviewed.  LVEF 50% and no valvular disease  - Stress Test With Myocardial Perfusion (1 Day); Future  - Holter Monitor - 72 Hour Up To 21 Days; Future    Patient will RTC to review monitor data in approximately 3 weeks.  Will contact patient with stress test results when available.

## 2018-06-28 ENCOUNTER — OFFICE VISIT (OUTPATIENT)
Dept: NEUROLOGY | Facility: CLINIC | Age: 53
End: 2018-06-28

## 2018-06-28 VITALS
WEIGHT: 197 LBS | HEIGHT: 65 IN | BODY MASS INDEX: 32.82 KG/M2 | DIASTOLIC BLOOD PRESSURE: 58 MMHG | SYSTOLIC BLOOD PRESSURE: 98 MMHG

## 2018-06-28 DIAGNOSIS — G47.33 OBSTRUCTIVE SLEEP APNEA SYNDROME: Primary | ICD-10-CM

## 2018-06-28 PROCEDURE — 99213 OFFICE O/P EST LOW 20 MIN: CPT | Performed by: PSYCHIATRY & NEUROLOGY

## 2018-06-28 NOTE — PROGRESS NOTES
Subjective   Liliana Quevedo is a 52 y.o. female.     Chief Complaint   Patient presents with   • Obstructive sleep apnea syndrome       History of Present Illness   Originally seen in 11/12 for her severe ABDULLAHI with  during the untreated portion of her split-night sleep study. Started CPAP at 15 cm. 7/15 download showed 100% compliance and AHI of 0.7.  6/16: planning weight loss surgery (sleeve) in about 6 months, in planning stages now. Seeing Dr Ramesh every month. Download again shows 100% compliance (days greater than 4 hours use) and AHI very low at 0.8.  6/17: feels doing well. Using CPAP, feels OK at current pressure. Has download but forgot to bring. Gets equipment through [employer]. Had gastric sleeve in February, has lost almost 70lb.  Wakes rested, no snoring.  Mother, aunt and grandmother all have ABDULLAHI. Download showed 100% compliance, AHI 1.5.  Today: doing well. Total wt loss is about 100lb, getting toward goal. Using machine every night. No change in download: compliance 100% (and using >4hours), and AHI still 1.5. Occasionally wakes noting air blowing, but gets back to sleep easily; pressure not too high. Wakes feeling rested. Son (29) now dx with ABDULLAHI as well.     Allergies   Allergen Reactions   • Ampicillin Hives   • Bactrim [Sulfamethoxazole-Trimethoprim] Hives and GI Intolerance   • Sulfa Antibiotics Hives     Hot and cold flashes, N&V, diarrhea       Current Outpatient Prescriptions on File Prior to Visit   Medication Sig Dispense Refill   • citalopram (CeleXA) 40 MG tablet TAKE ONE TABLET BY MOUTH DAILY 30 tablet 5   • levothyroxine (SYNTHROID, LEVOTHROID) 150 MCG tablet Take 150 mcg by mouth Daily.     • montelukast (SINGULAIR) 10 MG tablet Take 1 tablet by mouth Every Night. 30 tablet 5   • omega-3 acid ethyl esters (LOVAZA) 1 G capsule Take 4 g by mouth Every Night.     • pantoprazole (PROTONIX) 40 MG EC tablet Take 40 mg by mouth Daily.     • pravastatin (PRAVACHOL) 20 MG tablet Take 20 mg  "by mouth Every Night.       No current facility-administered medications on file prior to visit.        Past Medical History:   Diagnosis Date   • Acute sinusitis     Assessed By: Geremias Ramesh (Internal Medicine); Last Assessed: 23 Jan 2015   • Allergic rhinitis    • Carpal tunnel syndrome    • Colon polyp     SIGMOID   • DDD (degenerative disc disease), lumbar    • De Quervain's tenosynovitis    • Depression    • Edema     Assessed By: Geremias Ramesh (Internal Medicine); Last Assessed: 26 Nov 2014   • Elevated alkaline phosphatase level     102   • Fatigue    • Gastric ulcer    • GERD (gastroesophageal reflux disease)     on Protonix.  Says sx's not bad even if misses meds, \"depends on what I eat\".  EGD [facility] 6/16 40 cm, path DE reflux.  serum h. pyl neg   • Hematuria    • Hyperlipidemia    • Hypertension     per prim MD note   • Hypothyroidism     w/ hx thyromegaly s/p CASTILLO.    • Incomplete right bundle branch block    • Low back pain    • Mild cardiomegaly    • Morbid obesity    • Nephrolithiasis    • Osteoarthritis    • Peripheral edema    • Polyp of sigmoid colon    • Scoliosis    • Sleep apnea     CPAP compliant   • Tendinitis    • Viral URI     Assessed By: Geremias Ramesh (Internal Medicine); Last Assessed: 23 Jan 2015   • Vitamin D deficiency    • Weight gain     Assessed By: Geremias Ramesh (Internal Medicine); Last Assessed: 26 Nov 2014       Past Surgical History:   Procedure Laterality Date   • ANKLE SURGERY Right 2000    dislocation repair w/ plate    • CARPAL TUNNEL RELEASE Right    • COLONOSCOPY  2011    screening   • CYST REMOVAL Right     right index finger   • GASTRIC SLEEVE LAPAROSCOPIC N/A 2/22/2017    Procedure: GASTRIC SLEEVE LAPAROSCOPIC;  Surgeon: Sergio Gibbons MD;  Location: [facility];  Service:    • HYSTERECTOMY Bilateral 2004    (open) w/ oophorectomy - uterine fibroids and ovarian cysts   • KNEE SURGERY Left 2015     ligament repair   • LAPAROSCOPIC CHOLECYSTECTOMY  2011    for stones   • " "NOSE SURGERY     • OOPHORECTOMY         Social History     Social History   • Marital status:      Spouse name: N/A   • Number of children: N/A   • Years of education: N/A     Occupational History   • Not on file.     Social History Main Topics   • Smoking status: Never Smoker   • Smokeless tobacco: Never Used   • Alcohol use No   • Drug use: No   • Sexual activity: Not on file     Other Topics Concern   • Not on file     Social History Narrative    Lives in [place] w/ and inlaws    Working full time @[employer]        Review of Systems   Constitutional: Negative for fever and unexpected weight change.   Respiratory: Negative for cough and shortness of breath.    Cardiovascular: Negative for chest pain.       Objective   Blood pressure 98/58, height 165.1 cm (65\"), weight 89.4 kg (197 lb).    Physical Exam   Constitutional: She is oriented to person, place, and time. She appears well-developed and well-nourished.   HENT:   Head: Normocephalic and atraumatic.   Eyes: EOM are normal. Pupils are equal, round, and reactive to light.   Pulmonary/Chest: Effort normal.   Neurological: She is oriented to person, place, and time. She has a normal Finger-Nose-Finger Test and a normal Heel to Shin Test. Gait normal.   Skin: Skin is warm and dry.   Psychiatric: She has a normal mood and affect. Her speech is normal and behavior is normal. Judgment and thought content normal.   Nursing note and vitals reviewed.      Neurologic Exam     Mental Status   Oriented to person, place, and time.   Speech: speech is normal   Level of consciousness: alert  Knowledge: consistent with education.   Able to name object. Normal comprehension.     Cranial Nerves     CN II   Visual fields full to confrontation.     CN III, IV, VI   Pupils are equal, round, and reactive to light.  Extraocular motions are normal.     CN VII   Facial expression full, symmetric.     CN IX, X   CN IX normal.   CN X normal.   Palate: " symmetric    CN XI   CN XI normal.     CN XII   CN XII normal.     Motor Exam   Muscle bulk: normal  Right arm pronator drift: absent  Left arm pronator drift: absent    Strength   Strength 5/5 except as noted.     Sensory Exam   Light touch normal.     Gait, Coordination, and Reflexes     Gait  Gait: normal    Coordination   Finger to nose coordination: normal  Heel to shin coordination: normal    Tremor   Resting tremor: absent  Intention tremor: absent  Action tremor: absent      Assessment/Plan     Liliana was seen today for obstructive sleep apnea syndrome.    Diagnoses and all orders for this visit:    Obstructive sleep apnea syndrome      Discussion/Summary:  Again discussed risks of untreated ABDULLAHI, potential causes of her ABDULLAHI (may be more genetic than related to her weight), lowering of dementia risk with tx, and combining with exercise for overall health. Discussed again may need repeat study if recurrent sx (enumerated).  17 min face to face, 10 min in discussion as above.   Return in about 1 year (around 6/28/2019).

## 2018-07-05 ENCOUNTER — APPOINTMENT (OUTPATIENT)
Dept: CARDIOLOGY | Facility: HOSPITAL | Age: 53
End: 2018-07-05

## 2018-07-16 RX ORDER — PANTOPRAZOLE SODIUM 40 MG/1
TABLET, DELAYED RELEASE ORAL
Qty: 30 TABLET | Refills: 4 | Status: SHIPPED | OUTPATIENT
Start: 2018-07-16 | End: 2018-08-20 | Stop reason: ALTCHOICE

## 2018-08-10 ENCOUNTER — OFFICE VISIT (OUTPATIENT)
Dept: INTERNAL MEDICINE | Facility: CLINIC | Age: 53
End: 2018-08-10

## 2018-08-10 VITALS
DIASTOLIC BLOOD PRESSURE: 68 MMHG | TEMPERATURE: 96.9 F | HEART RATE: 60 BPM | WEIGHT: 198.13 LBS | SYSTOLIC BLOOD PRESSURE: 108 MMHG | BODY MASS INDEX: 32.97 KG/M2

## 2018-08-10 DIAGNOSIS — R42 DIZZINESS: Primary | ICD-10-CM

## 2018-08-10 DIAGNOSIS — R00.2 PALPITATIONS: ICD-10-CM

## 2018-08-10 PROCEDURE — 99214 OFFICE O/P EST MOD 30 MIN: CPT | Performed by: INTERNAL MEDICINE

## 2018-08-10 PROCEDURE — 93000 ELECTROCARDIOGRAM COMPLETE: CPT | Performed by: INTERNAL MEDICINE

## 2018-08-10 NOTE — PROGRESS NOTES
Subjective   Liliana Queveod is a 52 y.o. female.     History of Present Illness     Hepatitis A vaccine #2    2 lightheadness along with minimal palpitations. (new issue)   Patient says that for the past several months she's been having intermittent episodes of palpitations associated with some dizziness and lightheadedness.  Patient denies any shortness breath, dyspnea on exertion, no fever, no chills, no nausea, no vomiting, no diaphoresis, no chest pain, no other systemic symptoms.  She lives a very active lifestyle and this does tend to help with her maintaining her weight but she does not have any episodes of the palpitations.  Recently, she did appearance an episode of lightheadedness at the gym where she did have to stop and not continue her physical activity.    Family history:  Hypertension  CAD  Diabetes        Review of Systems   All other systems reviewed and are negative.      Objective   Physical Exam   Constitutional: She appears well-developed and well-nourished.   HENT:   Head: Normocephalic.   Right Ear: External ear normal.   Left Ear: External ear normal.   Nose: Nose normal.   Mouth/Throat: Oropharynx is clear and moist.   Eyes: Pupils are equal, round, and reactive to light. Conjunctivae are normal.   Neck: Normal range of motion. Neck supple.   Cardiovascular: Normal rate, regular rhythm, normal heart sounds and intact distal pulses.    Pulmonary/Chest: Effort normal and breath sounds normal.   Abdominal: Soft. Bowel sounds are normal.   Musculoskeletal: Normal range of motion.   Neurological: She is alert.   Skin: Skin is warm.   Nursing note and vitals reviewed.        Assessment/Plan   Liliana was seen today for follow-up.    Diagnoses and all orders for this visit:    Dizziness    Palpitations  -     ECG 12 Lead  -     Holter monitor - 72 hour; Future  Recommend not doing any extraneous physical exercise activity until further notice.        ECG 12 Lead  Date/Time: 8/10/2018 3:47  PM  Performed by: TESFAYE BLISS  Authorized by: TESFAYE BLISS   Comparison: not compared with previous ECG   Previous ECG: no previous ECG available  Rhythm: sinus bradycardia  Conduction: conduction normal  ST Segments: ST segments normal  Other: no other findings  Clinical impression: non-specific ECG

## 2018-08-13 ENCOUNTER — TELEPHONE (OUTPATIENT)
Dept: INTERNAL MEDICINE | Facility: CLINIC | Age: 53
End: 2018-08-13

## 2018-08-13 NOTE — TELEPHONE ENCOUNTER
----- Message from Joie Bear sent at 8/13/2018  9:00 AM EDT -----  [phone]  PT WAS HERE Friday AND WAS TO HAVE SECOND HEP A AND REVIEWED OTHER STUFF AND FORGOT TO GET SHOT , NURSE SCHEDULE IS FULL WHEN CAN YOU GET HER IN CALL PT TO DISCUSS

## 2018-08-13 NOTE — TELEPHONE ENCOUNTER
"----- Message from Dilcia Duran sent at 8/13/2018  1:14 PM EDT -----  Concerning holter monitor order, they do not have 72 hour monitor, it would either need to be 48 hour holter, or order for \"72 hour up to 21 day\" and specify either 14 day or 21 day.  Please enter new order.  Thanks  "

## 2018-08-16 DIAGNOSIS — R00.2 PALPITATIONS: Primary | ICD-10-CM

## 2018-08-16 NOTE — TELEPHONE ENCOUNTER
Noted, I cancelled old holter order.   Kacie, can you tell if hep A injection order was entered already before I close out message?

## 2018-08-20 ENCOUNTER — OFFICE VISIT (OUTPATIENT)
Dept: BARIATRICS/WEIGHT MGMT | Facility: CLINIC | Age: 53
End: 2018-08-20

## 2018-08-20 VITALS
TEMPERATURE: 97.7 F | WEIGHT: 194 LBS | HEIGHT: 64 IN | HEART RATE: 58 BPM | DIASTOLIC BLOOD PRESSURE: 70 MMHG | OXYGEN SATURATION: 99 % | BODY MASS INDEX: 33.12 KG/M2 | RESPIRATION RATE: 18 BRPM | SYSTOLIC BLOOD PRESSURE: 118 MMHG

## 2018-08-20 DIAGNOSIS — E55.9 HYPOVITAMINOSIS D: ICD-10-CM

## 2018-08-20 DIAGNOSIS — Z13.21 MALNUTRITION SCREEN: ICD-10-CM

## 2018-08-20 DIAGNOSIS — Z98.84 STATUS POST BARIATRIC SURGERY: Primary | ICD-10-CM

## 2018-08-20 DIAGNOSIS — R53.83 FATIGUE, UNSPECIFIED TYPE: ICD-10-CM

## 2018-08-20 DIAGNOSIS — Z90.3 POSTGASTRECTOMY MALABSORPTION: ICD-10-CM

## 2018-08-20 DIAGNOSIS — K91.2 POSTGASTRECTOMY MALABSORPTION: ICD-10-CM

## 2018-08-20 DIAGNOSIS — Z13.0 SCREENING, IRON DEFICIENCY ANEMIA: ICD-10-CM

## 2018-08-20 DIAGNOSIS — E66.9 OBESITY, CLASS I, BMI 30-34.9: ICD-10-CM

## 2018-08-20 PROCEDURE — 99214 OFFICE O/P EST MOD 30 MIN: CPT | Performed by: PHYSICIAN ASSISTANT

## 2018-08-20 RX ORDER — PANTOPRAZOLE SODIUM 20 MG/1
20 TABLET, DELAYED RELEASE ORAL DAILY
Qty: 30 TABLET | Refills: 5 | Status: SHIPPED | OUTPATIENT
Start: 2018-08-20 | End: 2019-07-02

## 2018-08-20 NOTE — PROGRESS NOTES
"[facility]  [address]  [facility] [address] [phone]        Patient Name:  Liliana Quevedo.  :  1965      Date of Visit: 2018      Reason for Visit:   18 month follow up    HPI: Liliana Quevedo is a 52 y.o. female s/p LSG by GDW on 17    Doing well. Has seen PCP re: dizziness, planning holter monitor as part of workup. Has felt lightheaded at the gym recently.  No GI issues/concerns. Reflux well controlled on pantoprazole 40mg, has very rare episodes, has not tried to d/c. Denies dysphagia, reflux, nausea, vomiting and abdominal pain.  Getting 80g prot/day, protein shake in am. 3 meals a day- snacks between. Salads, fruit, veggies, yogurt, meat- focuses on high protein.   Drinking 64+ fluid oz/day, water only.  Last labs revealed iron 29, , prealbumin 14 advised iron BID and B12 injections, protein 100g .  Taking MVI, B12, B1, Calcium, Vit D, iron and Vit C, was taking iron BID- now QD, she did 2 weeks of injections of B12.  On Pantoprazole. Exercising- was going 5 days a week, PCP told to hold off.      Presurgery weight: 264 pounds.  Today's weight is 88 kg (194 lb) pounds, today's  Body mass index is 33.3 kg/m²., and her weight loss since surgery is 70 pounds.      Past Medical History:   Diagnosis Date   • Acute sinusitis     Assessed By: Geremias Ramesh (Internal Medicine); Last Assessed: 2015   • Allergic rhinitis    • Carpal tunnel syndrome    • Colon polyp     SIGMOID   • DDD (degenerative disc disease), lumbar    • De Quervain's tenosynovitis    • Depression    • Edema     Assessed By: Geremias Ramesh (Internal Medicine); Last Assessed: 2014   • Elevated alkaline phosphatase level     102   • Fatigue    • Gastric ulcer    • GERD (gastroesophageal reflux disease)     on Protonix.  Says sx's not bad even if misses meds, \"depends on what I eat\".  EGD GDW  40 cm, path DE reflux.  serum h. pyl neg   • Hematuria    • " Hyperlipidemia    • Hypertension     per prim MD note   • Hypothyroidism     w/ hx thyromegaly s/p CASTILLO.    • Incomplete right bundle branch block    • Low back pain    • Mild cardiomegaly    • Morbid obesity (CMS/HCC)    • Nephrolithiasis    • Osteoarthritis    • Peripheral edema    • Polyp of sigmoid colon    • Scoliosis    • Sleep apnea     CPAP compliant   • Tendinitis    • Viral URI     Assessed By: Geremias Ramesh (Internal Medicine); Last Assessed: 23 Jan 2015   • Vitamin D deficiency    • Weight gain     Assessed By: Geremias Ramesh (Internal Medicine); Last Assessed: 26 Nov 2014     Past Surgical History:   Procedure Laterality Date   • ANKLE SURGERY Right 2000    dislocation repair w/ plate    • CARPAL TUNNEL RELEASE Right    • COLONOSCOPY  2011    screening   • CYST REMOVAL Right     right index finger   • GASTRIC SLEEVE LAPAROSCOPIC N/A 2/22/2017    Procedure: GASTRIC SLEEVE LAPAROSCOPIC;  Surgeon: Sergio Gibbons MD;  Location: [facility];  Service:    • HYSTERECTOMY Bilateral 2004    (open) w/ oophorectomy - uterine fibroids and ovarian cysts   • KNEE SURGERY Left 2015     ligament repair   • LAPAROSCOPIC CHOLECYSTECTOMY  2011    for stones   • NOSE SURGERY     • OOPHORECTOMY       Outpatient Prescriptions Marked as Taking for the 8/20/18 encounter (Office Visit) with Juani Gonzalez PA-C   Medication Sig Dispense Refill   • citalopram (CeleXA) 40 MG tablet TAKE ONE TABLET BY MOUTH DAILY 30 tablet 5   • levothyroxine (SYNTHROID, LEVOTHROID) 150 MCG tablet Take 150 mcg by mouth Daily.     • montelukast (SINGULAIR) 10 MG tablet Take 1 tablet by mouth Every Night. 30 tablet 5   • omega-3 acid ethyl esters (LOVAZA) 1 G capsule Take 4 g by mouth Every Night.     • pantoprazole (PROTONIX) 40 MG EC tablet TAKE ONE TABLET BY MOUTH DAILY 30 tablet 4   • pravastatin (PRAVACHOL) 20 MG tablet Take 20 mg by mouth Every Night.         Allergies   Allergen Reactions   • Ampicillin Hives   • Bactrim  "[Sulfamethoxazole-Trimethoprim] Hives and GI Intolerance   • Sulfa Antibiotics Hives     Hot and cold flashes, N&V, diarrhea       Social History     Social History   • Marital status:      Spouse name: N/A   • Number of children: N/A   • Years of education: N/A     Occupational History   • Not on file.     Social History Main Topics   • Smoking status: Never Smoker   • Smokeless tobacco: Never Used   • Alcohol use No   • Drug use: No   • Sexual activity: Not on file     Other Topics Concern   • Not on file     Social History Narrative    Lives in [place] w/ and inlaws    Working full time @[employer]        /70 (BP Location: Left arm, Patient Position: Sitting, Cuff Size: Large Adult)   Pulse 58   Temp 97.7 °F (36.5 °C) (Temporal Artery )   Resp 18   Ht 162.6 cm (64\")   Wt 88 kg (194 lb)   SpO2 99%   BMI 33.30 kg/m²     Physical Exam   Constitutional: She is oriented to person, place, and time. She appears well-developed and well-nourished.   HENT:   Head: Normocephalic and atraumatic.   Cardiovascular: Regular rhythm and normal heart sounds.    bradycardia   Pulmonary/Chest: Effort normal and breath sounds normal. No respiratory distress. She has no wheezes.   Abdominal: Soft. Bowel sounds are normal. She exhibits no distension.   Neurological: She is alert and oriented to person, place, and time.   Skin: Skin is warm and dry.   Psychiatric: She has a normal mood and affect. Her behavior is normal. Judgment and thought content normal.         Assessment:  18 months s/p LSG by WEN on 2/22/17    ICD-10-CM ICD-9-CM   1. Status post bariatric surgery Z98.84 V45.86   2. Obesity, Class I, BMI 30-34.9 E66.9 278.00   3. Hypovitaminosis D E55.9 268.9   4. Screening, iron deficiency anemia Z13.0 V78.0   5. Malnutrition screen Z13.21 V77.2   6. Postgastrectomy malabsorption K91.2 579.3    Z90.3    7. Fatigue, unspecified type R53.83 780.79         Plan:  Doing well.  Continue w/ good food " choices and healthy habits.  Continue to focus on high protein, low carb.  Keep tracking intake, target 1200 calories for continued weight loss, 100g protein.  Continue routine exercise when ok per PCP.  Routine bariatric labs ordered. Will reduce protonix to 20mg daily if symptoms well controlled.   Continue vitamins w/ adjustments pending lab results.  Call w/ problems/concerns.     The patient was instructed to follow up in 6 months, sooner if needed.      Total time spent w/ patient 25 minutes and 15 minutes spent counseling the patient on nutrition and necessary dietary/lifestyle modifications.

## 2018-08-22 ENCOUNTER — CLINICAL SUPPORT (OUTPATIENT)
Dept: INTERNAL MEDICINE | Facility: CLINIC | Age: 53
End: 2018-08-22

## 2018-08-22 DIAGNOSIS — Z23 IMMUNIZATION DUE: ICD-10-CM

## 2018-08-22 PROCEDURE — 90632 HEPA VACCINE ADULT IM: CPT | Performed by: INTERNAL MEDICINE

## 2018-08-22 PROCEDURE — 90471 IMMUNIZATION ADMIN: CPT | Performed by: INTERNAL MEDICINE

## 2018-08-23 LAB
25(OH)D3+25(OH)D2 SERPL-MCNC: 46 NG/ML
ALBUMIN SERPL-MCNC: 4.5 G/DL (ref 3.2–4.8)
ALBUMIN/GLOB SERPL: 1.7 G/DL (ref 1.5–2.5)
ALP SERPL-CCNC: 101 U/L (ref 25–100)
ALT SERPL-CCNC: 25 U/L (ref 7–40)
AST SERPL-CCNC: 28 U/L (ref 0–33)
BASOPHILS # BLD AUTO: 0.01 10*3/MM3 (ref 0–0.2)
BASOPHILS NFR BLD AUTO: 0.3 % (ref 0–1)
BILIRUB SERPL-MCNC: 0.8 MG/DL (ref 0.3–1.2)
BUN SERPL-MCNC: 18 MG/DL (ref 9–23)
BUN/CREAT SERPL: 23.1 (ref 7–25)
CALCIUM SERPL-MCNC: 9.7 MG/DL (ref 8.7–10.4)
CHLORIDE SERPL-SCNC: 99 MMOL/L (ref 99–109)
CO2 SERPL-SCNC: 32 MMOL/L (ref 20–31)
CREAT SERPL-MCNC: 0.78 MG/DL (ref 0.6–1.3)
EOSINOPHIL # BLD AUTO: 0.06 10*3/MM3 (ref 0–0.3)
EOSINOPHIL NFR BLD AUTO: 1.6 % (ref 0–3)
ERYTHROCYTE [DISTWIDTH] IN BLOOD BY AUTOMATED COUNT: 13.3 % (ref 11.3–14.5)
FERRITIN SERPL-MCNC: 292 NG/ML (ref 10–291)
FOLATE SERPL-MCNC: 21.07 NG/ML (ref 3.2–20)
GLOBULIN SER CALC-MCNC: 2.7 GM/DL
GLUCOSE SERPL-MCNC: 78 MG/DL (ref 70–100)
HCT VFR BLD AUTO: 43.7 % (ref 34.5–44)
HGB BLD-MCNC: 14.1 G/DL (ref 11.5–15.5)
IMM GRANULOCYTES # BLD: 0 10*3/MM3 (ref 0–0.03)
IMM GRANULOCYTES NFR BLD: 0 % (ref 0–0.6)
IRON SERPL-MCNC: 91 MCG/DL (ref 50–175)
LYMPHOCYTES # BLD AUTO: 1.43 10*3/MM3 (ref 0.6–4.8)
LYMPHOCYTES NFR BLD AUTO: 37.3 % (ref 24–44)
Lab: NORMAL
MCH RBC QN AUTO: 28.8 PG (ref 27–31)
MCHC RBC AUTO-ENTMCNC: 32.3 G/DL (ref 32–36)
MCV RBC AUTO: 89.4 FL (ref 80–99)
METHYLMALONATE SERPL-SCNC: 167 NMOL/L (ref 0–378)
MONOCYTES # BLD AUTO: 0.23 10*3/MM3 (ref 0–1)
MONOCYTES NFR BLD AUTO: 6 % (ref 0–12)
NEUTROPHILS # BLD AUTO: 2.1 10*3/MM3 (ref 1.5–8.3)
NEUTROPHILS NFR BLD AUTO: 54.8 % (ref 41–71)
PLATELET # BLD AUTO: 205 10*3/MM3 (ref 150–450)
POTASSIUM SERPL-SCNC: 4.5 MMOL/L (ref 3.5–5.5)
PREALB SERPL-MCNC: 23 MG/DL (ref 10–36)
PROT SERPL-MCNC: 7.2 G/DL (ref 5.7–8.2)
RBC # BLD AUTO: 4.89 10*6/MM3 (ref 3.89–5.14)
SODIUM SERPL-SCNC: 137 MMOL/L (ref 132–146)
VIT B1 BLD-SCNC: 140.3 NMOL/L (ref 66.5–200)
WBC # BLD AUTO: 3.83 10*3/MM3 (ref 3.5–10.8)

## 2018-09-16 ENCOUNTER — TELEPHONE (OUTPATIENT)
Dept: INTERNAL MEDICINE | Facility: CLINIC | Age: 53
End: 2018-09-16

## 2018-09-21 DIAGNOSIS — R42 VERTIGO: ICD-10-CM

## 2018-09-21 DIAGNOSIS — R42 DIZZINESS: Primary | ICD-10-CM

## 2018-09-21 DIAGNOSIS — F33.0 MILD EPISODE OF RECURRENT MAJOR DEPRESSIVE DISORDER (HCC): ICD-10-CM

## 2018-09-21 RX ORDER — CITALOPRAM 40 MG/1
TABLET ORAL
Qty: 30 TABLET | Refills: 4 | Status: SHIPPED | OUTPATIENT
Start: 2018-09-21 | End: 2019-02-19 | Stop reason: SDUPTHER

## 2018-10-04 ENCOUNTER — TRANSCRIBE ORDERS (OUTPATIENT)
Dept: ADMINISTRATIVE | Facility: HOSPITAL | Age: 53
End: 2018-10-04

## 2018-10-04 DIAGNOSIS — Z12.31 VISIT FOR SCREENING MAMMOGRAM: Primary | ICD-10-CM

## 2018-10-09 ENCOUNTER — HOSPITAL ENCOUNTER (OUTPATIENT)
Dept: CARDIOLOGY | Facility: HOSPITAL | Age: 53
Discharge: HOME | End: 2018-10-09
Attending: INTERNAL MEDICINE | Admitting: INTERNAL MEDICINE

## 2018-10-09 DIAGNOSIS — R42 VERTIGO: ICD-10-CM

## 2018-10-09 DIAGNOSIS — R42 DIZZINESS: ICD-10-CM

## 2018-10-09 LAB
BH CV XLRA MEAS LEFT DIST CCA EDV: 23 CM/SEC
BH CV XLRA MEAS LEFT DIST CCA PSV: 88 CM/SEC
BH CV XLRA MEAS LEFT DIST ICA EDV: 29 CM/SEC
BH CV XLRA MEAS LEFT DIST ICA PSV: 64 CM/SEC
BH CV XLRA MEAS LEFT ICA/CCA RATIO: 0.8
BH CV XLRA MEAS LEFT MID CCA EDV: 25 CM/SEC
BH CV XLRA MEAS LEFT MID CCA PSV: 102 CM/SEC
BH CV XLRA MEAS LEFT MID ICA EDV: 31 CM/SEC
BH CV XLRA MEAS LEFT MID ICA PSV: 67 CM/SEC
BH CV XLRA MEAS LEFT PROX CCA EDV: 23 CM/SEC
BH CV XLRA MEAS LEFT PROX CCA PSV: 138 CM/SEC
BH CV XLRA MEAS LEFT PROX ECA EDV: 20 CM/SEC
BH CV XLRA MEAS LEFT PROX ECA PSV: 60 CM/SEC
BH CV XLRA MEAS LEFT PROX ICA EDV: 26 CM/SEC
BH CV XLRA MEAS LEFT PROX ICA PSV: 60 CM/SEC
BH CV XLRA MEAS LEFT PROX SCLA EDV: 1 CM/SEC
BH CV XLRA MEAS LEFT PROX SCLA PSV: 205 CM/SEC
BH CV XLRA MEAS LEFT VERTEBRAL A EDV: 18 CM/SEC
BH CV XLRA MEAS LEFT VERTEBRAL A PSV: 47 CM/SEC
BH CV XLRA MEAS RIGHT DIST CCA EDV: 21 CM/SEC
BH CV XLRA MEAS RIGHT DIST CCA PSV: 94 CM/SEC
BH CV XLRA MEAS RIGHT DIST ICA EDV: 33 CM/SEC
BH CV XLRA MEAS RIGHT DIST ICA PSV: 91 CM/SEC
BH CV XLRA MEAS RIGHT ICA/CCA RATIO: 0.8
BH CV XLRA MEAS RIGHT MID CCA EDV: 27 CM/SEC
BH CV XLRA MEAS RIGHT MID CCA PSV: 91 CM/SEC
BH CV XLRA MEAS RIGHT MID ICA EDV: 33 CM/SEC
BH CV XLRA MEAS RIGHT MID ICA PSV: 77 CM/SEC
BH CV XLRA MEAS RIGHT PROX CCA EDV: 28 CM/SEC
BH CV XLRA MEAS RIGHT PROX CCA PSV: 129 CM/SEC
BH CV XLRA MEAS RIGHT PROX ECA EDV: 23 CM/SEC
BH CV XLRA MEAS RIGHT PROX ECA PSV: 101 CM/SEC
BH CV XLRA MEAS RIGHT PROX ICA EDV: 26 CM/SEC
BH CV XLRA MEAS RIGHT PROX ICA PSV: 60 CM/SEC
BH CV XLRA MEAS RIGHT PROX SCLA EDV: 1 CM/SEC
BH CV XLRA MEAS RIGHT PROX SCLA PSV: 274 CM/SEC
BH CV XLRA MEAS RIGHT VERTEBRAL A EDV: 15 CM/SEC
BH CV XLRA MEAS RIGHT VERTEBRAL A PSV: 50 CM/SEC
LEFT ARM BP: NORMAL MMHG
RIGHT ARM BP: NORMAL MMHG

## 2018-10-09 PROCEDURE — 93880 EXTRACRANIAL BILAT STUDY: CPT

## 2018-10-09 PROCEDURE — 93880 EXTRACRANIAL BILAT STUDY: CPT | Performed by: INTERNAL MEDICINE

## 2018-10-17 ENCOUNTER — OFFICE VISIT (OUTPATIENT)
Dept: NEUROLOGY | Facility: CLINIC | Age: 53
End: 2018-10-17

## 2018-10-17 VITALS
WEIGHT: 188 LBS | BODY MASS INDEX: 31.32 KG/M2 | HEIGHT: 65 IN | DIASTOLIC BLOOD PRESSURE: 80 MMHG | SYSTOLIC BLOOD PRESSURE: 140 MMHG

## 2018-10-17 DIAGNOSIS — R42 VERTIGO: Primary | ICD-10-CM

## 2018-10-17 PROCEDURE — 99214 OFFICE O/P EST MOD 30 MIN: CPT | Performed by: PSYCHIATRY & NEUROLOGY

## 2018-10-17 NOTE — PROGRESS NOTES
Subjective   Liliana Quevedo is a 53 y.o. female.     Chief Complaint   Patient presents with   • obstructive sleep apnea syndrome       History of Present Illness     Originally seen in 11/12 for her severe ABDULLAHI with  during the untreated portion of her split-night sleep study. Started CPAP at 15 cm. 7/15 download showed 100% compliance and AHI of 0.7.  6/16: planning weight loss surgery (sleeve) in about 6 months, in planning stages now. Seeing Dr Ramesh every month. Download again shows 100% compliance (days greater than 4 hours use) and AHI very low at 0.8.  6/17: feels doing well. Using CPAP, feels OK at current pressure. Has download but forgot to bring. Gets equipment through [employer]. Had gastric sleeve in February, has lost almost 70lb.  Wakes rested, no snoring.  Mother, aunt and grandmother all have ABDULLAHI. Download showed 100% compliance, AHI 1.5.  6/18: doing well. Total wt loss is about 100lb, getting toward goal. Using machine every night. No change in download: compliance 100% (and using >4hours), and AHI still 1.5. Occasionally wakes noting air blowing, but gets back to sleep easily; pressure not too high. Wakes feeling rested. Son (29) now dx with ABDULLAHI as well.    Today: appt scheduled for new sx. Was having dizziness, LH, feeling that room is moving, although not spinning, feeling shaky, sits down and it lasts couple minutes. No nausea. Had tests scheduled, including heart monitor for 2 weeks that was OK.  Carotid ultrasound showed no stenosis and antegrade vertebral artery flow.  Initially noted sx in about April, intermittent, no episodes past few weeks, has occurred as often as twice a week. No change in hearing, no incoordination. No gait trouble except during episode, then feels off balance. Doesn't feel she will pass out. Some bifrontal headache, not necessarily at time of dizziness; no vision sx. No initial trigger or trauma, episodes occur out of the blue, in any position.  Currently has a  "cold but is not always congested.    Allergies   Allergen Reactions   • Ampicillin Hives   • Bactrim [Sulfamethoxazole-Trimethoprim] Hives and GI Intolerance   • Sulfa Antibiotics Hives     Hot and cold flashes, N&V, diarrhea       Current Outpatient Prescriptions on File Prior to Visit   Medication Sig Dispense Refill   • citalopram (CeleXA) 40 MG tablet TAKE ONE TABLET BY MOUTH DAILY 30 tablet 4   • levothyroxine (SYNTHROID, LEVOTHROID) 150 MCG tablet Take 150 mcg by mouth Daily.     • montelukast (SINGULAIR) 10 MG tablet Take 1 tablet by mouth Every Night. 30 tablet 5   • pantoprazole (PROTONIX) 20 MG EC tablet Take 1 tablet by mouth Daily. 30 tablet 5   • omega-3 acid ethyl esters (LOVAZA) 1 G capsule Take 4 g by mouth Every Night.     • pravastatin (PRAVACHOL) 20 MG tablet Take 20 mg by mouth Every Night.       No current facility-administered medications on file prior to visit.        Past Medical History:   Diagnosis Date   • Acute sinusitis     Assessed By: Geremias Ramesh (Internal Medicine); Last Assessed: 23 Jan 2015   • Allergic rhinitis    • Carpal tunnel syndrome    • Colon polyp     SIGMOID   • DDD (degenerative disc disease), lumbar    • De Quervain's tenosynovitis    • Depression    • Edema     Assessed By: Geremias Ramesh (Internal Medicine); Last Assessed: 26 Nov 2014   • Elevated alkaline phosphatase level     102   • Fatigue    • Gastric ulcer    • GERD (gastroesophageal reflux disease)     on Protonix.  Says sx's not bad even if misses meds, \"depends on what I eat\".  EGD GDW 6/16 40 cm, path DE reflux.  serum h. pyl neg   • Hematuria    • Hyperlipidemia    • Hypertension     per prim MD note   • Hypothyroidism     w/ hx thyromegaly s/p CASTILLO.    • Incomplete right bundle branch block    • Low back pain    • Mild cardiomegaly    • Morbid obesity (CMS/HCC)    • Nephrolithiasis    • Osteoarthritis    • Peripheral edema    • Polyp of sigmoid colon    • Scoliosis    • Sleep apnea     CPAP compliant   • " "Tendinitis    • Viral URI     Assessed By: Geremias Ramesh (Internal Medicine); Last Assessed: 23 Jan 2015   • Vitamin D deficiency    • Weight gain     Assessed By: Geremias Ramesh (Internal Medicine); Last Assessed: 26 Nov 2014       Past Surgical History:   Procedure Laterality Date   • ANKLE SURGERY Right 2000    dislocation repair w/ plate    • CARPAL TUNNEL RELEASE Right    • COLONOSCOPY  2011    screening   • CYST REMOVAL Right     right index finger   • GASTRIC SLEEVE LAPAROSCOPIC N/A 2/22/2017    Procedure: GASTRIC SLEEVE LAPAROSCOPIC;  Surgeon: Sergio Gibbons MD;  Location: [facility];  Service:    • HYSTERECTOMY Bilateral 2004    (open) w/ oophorectomy - uterine fibroids and ovarian cysts   • KNEE SURGERY Left 2015     ligament repair   • LAPAROSCOPIC CHOLECYSTECTOMY  2011    for stones   • NOSE SURGERY     • OOPHORECTOMY         Social History     Social History   • Marital status:      Spouse name: N/A   • Number of children: N/A   • Years of education: N/A     Occupational History   • Not on file.     Social History Main Topics   • Smoking status: Never Smoker   • Smokeless tobacco: Never Used   • Alcohol use No   • Drug use: No   • Sexual activity: Defer     Other Topics Concern   • Not on file     Social History Narrative    Lives in [place] w/ and inlaws    Working full time @[employer]        Review of Systems   Constitutional: Negative for fever and unexpected weight change.   Respiratory: Negative for cough and shortness of breath.    Cardiovascular: Negative for chest pain.   Neurological: Positive for dizziness and headaches.       Objective   Blood pressure 140/80, height 165.1 cm (65\"), weight 85.3 kg (188 lb).    Physical Exam   Constitutional: She is oriented to person, place, and time. She appears well-developed and well-nourished.   HENT:   Head: Normocephalic and atraumatic.   Eyes: Pupils are equal, round, and reactive to light. EOM are normal.   Pulmonary/Chest: " Effort normal.   Neurological: She is oriented to person, place, and time. She has a normal Finger-Nose-Finger Test and a normal Heel to Shin Test. Gait normal.   Skin: Skin is warm and dry.   Psychiatric: She has a normal mood and affect. Her speech is normal and behavior is normal. Judgment and thought content normal.   Nursing note and vitals reviewed.      Neurologic Exam     Mental Status   Oriented to person, place, and time.   Speech: speech is normal   Level of consciousness: alert  Knowledge: consistent with education.   Able to name object. Normal comprehension.     Cranial Nerves     CN II   Visual fields full to confrontation.     CN III, IV, VI   Pupils are equal, round, and reactive to light.  Extraocular motions are normal.     CN V   Facial sensation intact.     CN VII   Facial expression full, symmetric.     CN VIII   CN VIII normal.     CN IX, X   CN IX normal.   CN X normal.   Palate: symmetric    CN XI   CN XI normal.     CN XII   CN XII normal.   Optic disks with no papilledema     Motor Exam   Muscle bulk: normal  Right arm pronator drift: absent  Left arm pronator drift: absent    Strength   Strength 5/5 except as noted.     Sensory Exam   Light touch normal.     Gait, Coordination, and Reflexes     Gait  Gait: normal    Coordination   Finger to nose coordination: normal  Heel to shin coordination: normal    Tremor   Resting tremor: absent  Intention tremor: absent  Action tremor: absentNormal fine finger movements       Assessment/Plan     Liliana was seen today for obstructive sleep apnea syndrome.    Diagnoses and all orders for this visit:    Vertigo  -     MRI Brain Without Contrast; Future  -     Ambulatory Referral to ENT (Otolaryngology)      Discussion/Summary:  She describes true vertigo with a hallucination of movement of her environment.  It appears unlikely migrainous given short duration and lack of accompanying headache.  Concern is for brainstem or cerebellar involvement and we  will plan on MRI.  However, if this is unremarkable, I have recommended evaluation by Dr. Jonah Diaz.  Fairly low suspicion of e.g. Menieres, but should have ENT evaluation.  Will keep currently scheduled follow-up for ABDULLAHI, may see her back sooner pending MRI result.

## 2018-10-25 ENCOUNTER — HOSPITAL ENCOUNTER (OUTPATIENT)
Dept: MRI IMAGING | Facility: HOSPITAL | Age: 53
Discharge: HOME | End: 2018-10-25
Attending: PSYCHIATRY & NEUROLOGY | Admitting: PSYCHIATRY & NEUROLOGY

## 2018-10-25 DIAGNOSIS — R42 VERTIGO: ICD-10-CM

## 2018-10-25 PROCEDURE — 70551 MRI BRAIN STEM W/O DYE: CPT

## 2018-10-29 ENCOUNTER — HOSPITAL ENCOUNTER (OUTPATIENT)
Dept: MAMMOGRAPHY | Facility: HOSPITAL | Age: 53
Discharge: HOME | End: 2018-10-29
Admitting: OBSTETRICS & GYNECOLOGY

## 2018-10-29 DIAGNOSIS — Z12.31 VISIT FOR SCREENING MAMMOGRAM: ICD-10-CM

## 2018-10-29 PROCEDURE — 77063 BREAST TOMOSYNTHESIS BI: CPT | Performed by: RADIOLOGY

## 2018-10-29 PROCEDURE — 77063 BREAST TOMOSYNTHESIS BI: CPT

## 2018-10-29 PROCEDURE — 77067 SCR MAMMO BI INCL CAD: CPT | Performed by: RADIOLOGY

## 2018-10-29 PROCEDURE — 77067 SCR MAMMO BI INCL CAD: CPT

## 2018-12-11 DIAGNOSIS — J30.9 ALLERGIC RHINITIS: ICD-10-CM

## 2018-12-11 RX ORDER — MONTELUKAST SODIUM 10 MG/1
TABLET ORAL
Qty: 30 TABLET | Refills: 4 | Status: SHIPPED | OUTPATIENT
Start: 2018-12-11 | End: 2019-05-10 | Stop reason: SDUPTHER

## 2019-02-19 DIAGNOSIS — F33.0 MILD EPISODE OF RECURRENT MAJOR DEPRESSIVE DISORDER (HCC): ICD-10-CM

## 2019-02-19 RX ORDER — CITALOPRAM 40 MG/1
TABLET ORAL
Qty: 30 TABLET | Refills: 3 | Status: SHIPPED | OUTPATIENT
Start: 2019-02-19 | End: 2019-06-23 | Stop reason: SDUPTHER

## 2019-05-10 DIAGNOSIS — J30.9 ALLERGIC RHINITIS: ICD-10-CM

## 2019-05-10 RX ORDER — MONTELUKAST SODIUM 10 MG/1
TABLET ORAL
Qty: 30 TABLET | Refills: 3 | Status: SHIPPED | OUTPATIENT
Start: 2019-05-10 | End: 2019-09-14 | Stop reason: SDUPTHER

## 2019-06-18 ENCOUNTER — TELEPHONE (OUTPATIENT)
Dept: NEUROLOGY | Facility: CLINIC | Age: 54
End: 2019-06-18

## 2019-06-18 NOTE — TELEPHONE ENCOUNTER
Called pt to let her know that she had been scheduled with [facility] per Fabian's request.    Pt need to know:    Appt:  6/27 at 12:45    [place] [address] [address]  [address], [address]  [phone]

## 2019-06-23 DIAGNOSIS — F33.0 MILD EPISODE OF RECURRENT MAJOR DEPRESSIVE DISORDER (HCC): ICD-10-CM

## 2019-06-24 NOTE — TELEPHONE ENCOUNTER
Last OV 08/10/18  Next OV 06/25/19    [place] none   CSA none  UDS none    Last filled on 02/19/19 #30 refills 3

## 2019-06-25 ENCOUNTER — OFFICE VISIT (OUTPATIENT)
Dept: INTERNAL MEDICINE | Facility: CLINIC | Age: 54
End: 2019-06-25

## 2019-06-25 VITALS
DIASTOLIC BLOOD PRESSURE: 68 MMHG | BODY MASS INDEX: 34.34 KG/M2 | WEIGHT: 206.38 LBS | HEART RATE: 50 BPM | RESPIRATION RATE: 20 BRPM | SYSTOLIC BLOOD PRESSURE: 102 MMHG | OXYGEN SATURATION: 98 % | TEMPERATURE: 97.4 F

## 2019-06-25 DIAGNOSIS — K21.9 GASTROESOPHAGEAL REFLUX DISEASE WITHOUT ESOPHAGITIS: ICD-10-CM

## 2019-06-25 DIAGNOSIS — L91.8 SKIN TAG: Primary | ICD-10-CM

## 2019-06-25 PROCEDURE — 99213 OFFICE O/P EST LOW 20 MIN: CPT | Performed by: INTERNAL MEDICINE

## 2019-06-25 RX ORDER — CITALOPRAM 40 MG/1
TABLET ORAL
Qty: 90 TABLET | Refills: 2 | Status: SHIPPED | OUTPATIENT
Start: 2019-06-25 | End: 2020-03-30

## 2019-06-25 NOTE — PROGRESS NOTES
"Subjective   Liliana Quevedo is a 53 y.o. female.     History of Present Illness     The following portions of the patient's history were reviewed and updated as appropriate: allergies, current medications, past family history, past medical history, past social history, past surgical history and problem list.    Left eye stye  Duration 1 week   Patient has noticed a small \"bump \"on the upper eyelid of her left eye.  No history of any trauma, no irritation, no other systemic symptoms.       2 GERD- recurrent For the past 1 to 2 weeks patient has been having intermittent episodes of breaking through with her reflux symptoms.  Mild heartburn midepigastric, no nausea, no ongoing diarrhea, no abdominal pain, no fever, chills, no weight loss, no other systemic illness.          Review of Systems   All other systems reviewed and are negative.      Objective   Physical Exam   Constitutional: She appears well-developed and well-nourished.   HENT:   Head: Normocephalic and atraumatic.   Eyes: EOM are normal. Pupils are equal, round, and reactive to light.   Cardiovascular: Normal rate, regular rhythm and normal heart sounds.   Pulmonary/Chest: Effort normal and breath sounds normal.   Musculoskeletal: Normal range of motion.   Skin: Skin is warm.   Small individual bump or skin tag on upper eyelid.   Nursing note and vitals reviewed.        Assessment/Plan   Liliana was seen today for eye pain.    Diagnoses and all orders for this visit:    Skin tag  -     Ambulatory Referral to Ophthalmology    Gastroesophageal reflux disease without esophagitis  Recommend trial of over-the-counter PPI for the next 2 to 3 weeks  And transition with Zantac as needed.          "

## 2019-07-02 ENCOUNTER — OFFICE VISIT (OUTPATIENT)
Dept: NEUROLOGY | Facility: CLINIC | Age: 54
End: 2019-07-02

## 2019-07-02 VITALS
RESPIRATION RATE: 16 BRPM | SYSTOLIC BLOOD PRESSURE: 110 MMHG | BODY MASS INDEX: 34.82 KG/M2 | WEIGHT: 209 LBS | HEART RATE: 53 BPM | DIASTOLIC BLOOD PRESSURE: 76 MMHG | HEIGHT: 65 IN | OXYGEN SATURATION: 99 %

## 2019-07-02 DIAGNOSIS — G47.33 OBSTRUCTIVE SLEEP APNEA SYNDROME: Primary | ICD-10-CM

## 2019-07-02 PROCEDURE — 99212 OFFICE O/P EST SF 10 MIN: CPT | Performed by: PSYCHIATRY & NEUROLOGY

## 2019-07-02 RX ORDER — LEVOTHYROXINE SODIUM 137 UG/1
137 TABLET ORAL DAILY
COMMUNITY
Start: 2019-06-26 | End: 2021-04-05 | Stop reason: SDUPTHER

## 2019-07-02 NOTE — PROGRESS NOTES
Subjective   Liliana Quevedo is a 53 y.o. female.     Chief Complaint   Patient presents with   • Sleep Apnea       History of Present Illness     53 y.o. female seen in follow up for ABDULLAHI.  Previously followed by Dr Peralta.       11/2012.  Treated with CPAP 15 cm H20.    S/P Gastric sleeve 2/2017 and had over 100 lb wt loss.      Compliant with CPAP and receiving benefit.  Improved sleep quality and daytime alertness.      Renew mask and supplies on scheduled basis.      DME:  [facility]     Allergies   Allergen Reactions   • Ampicillin Hives   • Bactrim [Sulfamethoxazole-Trimethoprim] Hives and GI Intolerance   • Sulfa Antibiotics Hives     Hot and cold flashes, N&V, diarrhea       Current Outpatient Medications on File Prior to Visit   Medication Sig Dispense Refill   • citalopram (CeleXA) 40 MG tablet TAKE ONE TABLET BY MOUTH DAILY 90 tablet 2   • levothyroxine (SYNTHROID, LEVOTHROID) 137 MCG tablet      • montelukast (SINGULAIR) 10 MG tablet TAKE ONE TABLET BY MOUTH ONCE NIGHTLY 30 tablet 3   • [DISCONTINUED] levothyroxine (SYNTHROID, LEVOTHROID) 150 MCG tablet Take 150 mcg by mouth Daily.     • [DISCONTINUED] omega-3 acid ethyl esters (LOVAZA) 1 G capsule Take 4 g by mouth Every Night.     • [DISCONTINUED] pantoprazole (PROTONIX) 20 MG EC tablet Take 1 tablet by mouth Daily. 30 tablet 5   • [DISCONTINUED] pravastatin (PRAVACHOL) 20 MG tablet Take 20 mg by mouth Every Night.       No current facility-administered medications on file prior to visit.        Past Medical History:   Diagnosis Date   • Acute sinusitis     Assessed By: Geremias Ramesh (Internal Medicine); Last Assessed: 23 Jan 2015   • Allergic rhinitis    • Carpal tunnel syndrome    • Colon polyp     SIGMOID   • DDD (degenerative disc disease), lumbar    • De Quervain's tenosynovitis    • Depression    • Edema     Assessed By: Geremias Ramesh (Internal Medicine); Last Assessed: 26 Nov 2014   • Elevated alkaline phosphatase level     102   • Fatigue   "  • Gastric ulcer    • GERD (gastroesophageal reflux disease)     on Protonix.  Says sx's not bad even if misses meds, \"depends on what I eat\".  EGD GDW 6/16 40 cm, path DE reflux.  serum h. pyl neg   • Hematuria    • Hyperlipidemia    • Hypertension     per prim MD note   • Hypothyroidism     w/ hx thyromegaly s/p CASTILLO.    • Incomplete right bundle branch block    • Low back pain    • Mild cardiomegaly    • Morbid obesity (CMS/HCC)    • Nephrolithiasis    • Osteoarthritis    • Peripheral edema    • Polyp of sigmoid colon    • Scoliosis    • Sleep apnea     CPAP compliant   • Tendinitis    • Viral URI     Assessed By: Geremias Ramesh (Internal Medicine); Last Assessed: 23 Jan 2015   • Vitamin D deficiency    • Weight gain     Assessed By: Geremias Ramesh (Internal Medicine); Last Assessed: 26 Nov 2014       Past Surgical History:   Procedure Laterality Date   • ANKLE SURGERY Right 2000    dislocation repair w/ plate    • CARPAL TUNNEL RELEASE Right    • COLONOSCOPY  2011    screening   • CYST REMOVAL Right     right index finger   • GASTRIC SLEEVE LAPAROSCOPIC N/A 2/22/2017    Procedure: GASTRIC SLEEVE LAPAROSCOPIC;  Surgeon: Sergio Gibbons MD;  Location: [facility];  Service:    • HYSTERECTOMY Bilateral 2004    (open) w/ oophorectomy - uterine fibroids and ovarian cysts   • KNEE SURGERY Left 2015     ligament repair   • LAPAROSCOPIC CHOLECYSTECTOMY  2011    for stones   • NOSE SURGERY     • OOPHORECTOMY Bilateral 2004       Social History     Socioeconomic History   • Marital status:      Spouse name: Not on file   • Number of children: Not on file   • Years of education: Not on file   • Highest education level: Not on file   Tobacco Use   • Smoking status: Never Smoker   • Smokeless tobacco: Never Used   Substance and Sexual Activity   • Alcohol use: No   • Drug use: No   • Sexual activity: Defer   Social History Narrative    Lives in [place] w/ and inlaws    Working full time @[employer]  " "      Review of Systems   Constitutional: Negative for unexpected weight change.   Respiratory: Negative for shortness of breath.    Neurological: Positive for dizziness.       Objective   Blood pressure 110/76, pulse 53, resp. rate 16, height 165.1 cm (65\"), weight 94.8 kg (209 lb), SpO2 99 %, not currently breastfeeding.    Physical Exam   Constitutional: She is oriented to person, place, and time. She appears well-developed and well-nourished.   HENT:   Head: Normocephalic and atraumatic.   Eyes: EOM are normal. Pupils are equal, round, and reactive to light.   Pulmonary/Chest: Effort normal.   Neurological: She is oriented to person, place, and time. She has a normal Finger-Nose-Finger Test and a normal Heel to Shin Test. Gait normal.   Skin: Skin is warm and dry.   Psychiatric: She has a normal mood and affect. Her speech is normal and behavior is normal. Judgment and thought content normal.   Nursing note and vitals reviewed.      Neurologic Exam     Mental Status   Oriented to person, place, and time.   Speech: speech is normal   Level of consciousness: alert  Knowledge: consistent with education.   Able to name object. Normal comprehension.     Cranial Nerves     CN II   Visual fields full to confrontation.     CN III, IV, VI   Pupils are equal, round, and reactive to light.  Extraocular motions are normal.     CN V   Facial sensation intact.     CN VII   Facial expression full, symmetric.     CN VIII   CN VIII normal.     CN IX, X   CN IX normal.   CN X normal.   Palate: symmetric    CN XI   CN XI normal.     CN XII   CN XII normal.   Optic disks with no papilledema     Motor Exam   Muscle bulk: normal  Right arm pronator drift: absent  Left arm pronator drift: absent    Strength   Strength 5/5 except as noted.     Sensory Exam   Light touch normal.     Gait, Coordination, and Reflexes     Gait  Gait: normal    Coordination   Finger to nose coordination: normal  Heel to shin coordination: normal    Tremor "   Resting tremor: absent  Intention tremor: absent  Action tremor: absentNormal fine finger movements       Assessment/Plan     Liliana was seen today for sleep apnea.    Diagnoses and all orders for this visit:    Obstructive sleep apnea syndrome      Compliant with CPAP and receiving benefit.

## 2019-09-09 ENCOUNTER — OFFICE VISIT (OUTPATIENT)
Dept: INTERNAL MEDICINE | Facility: CLINIC | Age: 54
End: 2019-09-09

## 2019-09-09 VITALS
TEMPERATURE: 97.6 F | HEIGHT: 63 IN | DIASTOLIC BLOOD PRESSURE: 60 MMHG | WEIGHT: 210 LBS | OXYGEN SATURATION: 97 % | BODY MASS INDEX: 37.21 KG/M2 | RESPIRATION RATE: 20 BRPM | HEART RATE: 52 BPM | SYSTOLIC BLOOD PRESSURE: 102 MMHG

## 2019-09-09 DIAGNOSIS — Z00.00 WELL ADULT EXAM: Primary | ICD-10-CM

## 2019-09-09 DIAGNOSIS — K21.9 GASTROESOPHAGEAL REFLUX DISEASE WITHOUT ESOPHAGITIS: ICD-10-CM

## 2019-09-09 DIAGNOSIS — Z63.6 CAREGIVER STRESS: ICD-10-CM

## 2019-09-09 DIAGNOSIS — Z13.220 LIPID SCREENING: ICD-10-CM

## 2019-09-09 PROBLEM — N39.3 FEMALE STRESS INCONTINENCE: Status: ACTIVE | Noted: 2017-12-20

## 2019-09-09 LAB
ALBUMIN SERPL-MCNC: 4.6 G/DL (ref 3.5–5.2)
ALBUMIN/GLOB SERPL: 1.8 G/DL
ALP SERPL-CCNC: 123 U/L (ref 39–117)
ALT SERPL W P-5'-P-CCNC: 14 U/L (ref 1–33)
ANION GAP SERPL CALCULATED.3IONS-SCNC: 7.7 MMOL/L (ref 5–15)
AST SERPL-CCNC: 18 U/L (ref 1–32)
BILIRUB SERPL-MCNC: 0.4 MG/DL (ref 0.2–1.2)
BUN BLD-MCNC: 11 MG/DL (ref 6–20)
BUN/CREAT SERPL: 13.9 (ref 7–25)
CALCIUM SPEC-SCNC: 9.7 MG/DL (ref 8.6–10.5)
CHLORIDE SERPL-SCNC: 100 MMOL/L (ref 98–107)
CHOLEST SERPL-MCNC: 164 MG/DL (ref 0–200)
CO2 SERPL-SCNC: 31.3 MMOL/L (ref 22–29)
CREAT BLD-MCNC: 0.79 MG/DL (ref 0.57–1)
DEPRECATED RDW RBC AUTO: 42.2 FL (ref 37–54)
ERYTHROCYTE [DISTWIDTH] IN BLOOD BY AUTOMATED COUNT: 12.8 % (ref 12.3–15.4)
GFR SERPL CREATININE-BSD FRML MDRD: 76 ML/MIN/1.73
GLOBULIN UR ELPH-MCNC: 2.6 GM/DL
GLUCOSE BLD-MCNC: 84 MG/DL (ref 65–99)
HCT VFR BLD AUTO: 43.1 % (ref 34–46.6)
HDLC SERPL-MCNC: 45 MG/DL (ref 40–60)
HGB BLD-MCNC: 13.7 G/DL (ref 12–15.9)
LDLC SERPL CALC-MCNC: 95 MG/DL (ref 0–100)
LDLC/HDLC SERPL: 2.11 {RATIO}
MCH RBC QN AUTO: 28.6 PG (ref 26.6–33)
MCHC RBC AUTO-ENTMCNC: 31.8 G/DL (ref 31.5–35.7)
MCV RBC AUTO: 90 FL (ref 79–97)
PLATELET # BLD AUTO: 219 10*3/MM3 (ref 140–450)
PMV BLD AUTO: 11 FL (ref 6–12)
POTASSIUM BLD-SCNC: 3.9 MMOL/L (ref 3.5–5.2)
PROT SERPL-MCNC: 7.2 G/DL (ref 6–8.5)
RBC # BLD AUTO: 4.79 10*6/MM3 (ref 3.77–5.28)
SODIUM BLD-SCNC: 139 MMOL/L (ref 136–145)
T4 FREE SERPL-MCNC: 1.55 NG/DL (ref 0.93–1.7)
TRIGL SERPL-MCNC: 121 MG/DL (ref 0–150)
TSH SERPL DL<=0.05 MIU/L-ACNC: 0.34 UIU/ML (ref 0.27–4.2)
VLDLC SERPL-MCNC: 24.2 MG/DL (ref 5–40)
WBC NRBC COR # BLD: 4.09 10*3/MM3 (ref 3.4–10.8)

## 2019-09-09 PROCEDURE — 36415 COLL VENOUS BLD VENIPUNCTURE: CPT | Performed by: INTERNAL MEDICINE

## 2019-09-09 PROCEDURE — 84439 ASSAY OF FREE THYROXINE: CPT | Performed by: INTERNAL MEDICINE

## 2019-09-09 PROCEDURE — 99396 PREV VISIT EST AGE 40-64: CPT | Performed by: INTERNAL MEDICINE

## 2019-09-09 PROCEDURE — 85027 COMPLETE CBC AUTOMATED: CPT | Performed by: INTERNAL MEDICINE

## 2019-09-09 PROCEDURE — 84443 ASSAY THYROID STIM HORMONE: CPT | Performed by: INTERNAL MEDICINE

## 2019-09-09 PROCEDURE — 80053 COMPREHEN METABOLIC PANEL: CPT | Performed by: INTERNAL MEDICINE

## 2019-09-09 PROCEDURE — 80061 LIPID PANEL: CPT | Performed by: INTERNAL MEDICINE

## 2019-09-09 RX ORDER — PANTOPRAZOLE SODIUM 40 MG/1
40 TABLET, DELAYED RELEASE ORAL DAILY
Qty: 30 TABLET | Refills: 3 | Status: SHIPPED | OUTPATIENT
Start: 2019-09-09 | End: 2021-04-05

## 2019-09-09 SDOH — SOCIAL STABILITY - SOCIAL INSECURITY: DEPENDENT RELATIVE NEEDING CARE AT HOME: Z63.6

## 2019-09-09 NOTE — PROGRESS NOTES
"Subjective   Liliana Quevedo is a 53 y.o. female.     History of Present Illness     The following portions of the patient's history were reviewed and updated as appropriate: allergies, current medications, past family history, past medical history, past social history, past surgical history and problem list.        Complete Adult Physical    1 vertigo/nystagmus- Patient underwent movement eye test and balance test.  The results of these test are currently pending.  She says that she has not had any recent exacerbations or    2 GERD- chronic with recent exacerbations.  Patient says that she has been having intermittent episodes of heartburn and reflux.  Normally her reflux has been controlled on Protonix.  She has been out of this medication because she has not had to do with any of the symptoms.    3 caregiver stress-patient says that she has been under a lot of stress here lately with the caring of her father.  She is the sole caregiver for her father along with the help of her .  Patient says that \"I just do not have much time for myself \"because she also has to take care of her grandkids for about 2 hours while parents are in transition from work on a daily basis.    Diet regular diet      Exercise: At least 3-4 times a week      Social History: No EtOH consumption, no IV drug use, no marijuana, no illicit drugs.        Preventative Screenings  Mammogram: due in October  Pap smear will be done next month October      Immunizations:      Review of Systems   All other systems reviewed and are negative.      Objective   Physical Exam   Constitutional: She is oriented to person, place, and time. She appears well-developed and well-nourished.   HENT:   Head: Normocephalic.   Right Ear: External ear normal.   Left Ear: External ear normal.   Nose: Nose normal.   Mouth/Throat: Oropharynx is clear and moist.   Eyes: Conjunctivae and EOM are normal. Pupils are equal, round, and reactive to light.   Neck: Normal " range of motion. Neck supple.   Cardiovascular: Normal rate, regular rhythm, normal heart sounds and intact distal pulses.   Pulmonary/Chest: Effort normal and breath sounds normal.   Abdominal: Soft. Bowel sounds are normal.   Musculoskeletal: Normal range of motion.   Neurological: She is alert and oriented to person, place, and time.   Skin: Skin is warm.   Psychiatric: She has a normal mood and affect. Her behavior is normal. Judgment and thought content normal.   Nursing note and vitals reviewed.        Assessment/Plan   Liliana was seen today for annual exam.    Diagnoses and all orders for this visit:    Well adult exam  -     CBC (No Diff)  -     Comprehensive Metabolic Panel  -     T4, Free  -     TSH    Gastroesophageal reflux disease without esophagitis  -     pantoprazole (PROTONIX) 40 MG EC tablet; Take 1 tablet by mouth Daily.  If symptoms do not improve patient has been instructed to follow-up in clinic.    Caregiver stress    Lipid screening  -     Lipid Panel  Anticipatory guidance:  Recommend frequent scheduled breaks throughout the week or personal time in regards to caregiver promotion and balance.  Recommend routine ophthalmology visit.  Recommend routine dental visit.

## 2019-09-14 DIAGNOSIS — J30.9 ALLERGIC RHINITIS: ICD-10-CM

## 2019-09-17 RX ORDER — MONTELUKAST SODIUM 10 MG/1
TABLET ORAL
Qty: 30 TABLET | Refills: 2 | Status: SHIPPED | OUTPATIENT
Start: 2019-09-17 | End: 2019-12-15 | Stop reason: SDUPTHER

## 2019-10-03 ENCOUNTER — TRANSCRIBE ORDERS (OUTPATIENT)
Dept: ADMINISTRATIVE | Facility: HOSPITAL | Age: 54
End: 2019-10-03

## 2019-10-03 DIAGNOSIS — Z12.31 VISIT FOR SCREENING MAMMOGRAM: Primary | ICD-10-CM

## 2019-10-21 ENCOUNTER — PRIOR AUTHORIZATION (OUTPATIENT)
Dept: INTERNAL MEDICINE | Facility: CLINIC | Age: 54
End: 2019-10-21

## 2019-11-05 ENCOUNTER — OFFICE VISIT (OUTPATIENT)
Dept: INTERNAL MEDICINE | Facility: CLINIC | Age: 54
End: 2019-11-05

## 2019-11-05 VITALS
DIASTOLIC BLOOD PRESSURE: 70 MMHG | SYSTOLIC BLOOD PRESSURE: 110 MMHG | TEMPERATURE: 97.5 F | WEIGHT: 207.4 LBS | HEART RATE: 65 BPM | RESPIRATION RATE: 16 BRPM | OXYGEN SATURATION: 96 % | HEIGHT: 63 IN | BODY MASS INDEX: 36.75 KG/M2

## 2019-11-05 DIAGNOSIS — J30.2 SEASONAL ALLERGIC RHINITIS, UNSPECIFIED TRIGGER: Primary | ICD-10-CM

## 2019-11-05 PROCEDURE — 99213 OFFICE O/P EST LOW 20 MIN: CPT | Performed by: PHYSICIAN ASSISTANT

## 2019-11-05 RX ORDER — INFLUENZA VIRUS VACCINE 15; 15; 15; 15 UG/.5ML; UG/.5ML; UG/.5ML; UG/.5ML
SUSPENSION INTRAMUSCULAR
Refills: 0 | COMMUNITY
Start: 2019-10-04 | End: 2019-11-05

## 2019-11-05 NOTE — PROGRESS NOTES
Chief Complaint   Patient presents with   • Sinus Problem     x1 week, cough, sneezing, itchy , scratchy throat,sinus pressure       Subjective       History of Present Illness     Liliana Quevedo is a 54 y.o. female. She presents with 1 week history of allergy symptoms. She reports dry cough, sneezing, sinus congestion, dry irritated throat, and itchy, watery eyes. She takes singulair nightly but no other allergy meds. She has not tried anything in addition to singulair. She does have seasonal allergies, particularly in the fall with change in seasons.     The following portions of the patient's history were reviewed and updated as appropriate: allergies, current medications, past medical history, past social history and problem list.    Allergies   Allergen Reactions   • Ampicillin Hives   • Bactrim [Sulfamethoxazole-Trimethoprim] Hives and GI Intolerance   • Sulfa Antibiotics Hives     Hot and cold flashes, N&V, diarrhea     Social History     Tobacco Use   • Smoking status: Never Smoker   • Smokeless tobacco: Never Used   Substance Use Topics   • Alcohol use: No         Current Outpatient Medications:   •  citalopram (CeleXA) 40 MG tablet, TAKE ONE TABLET BY MOUTH DAILY, Disp: 90 tablet, Rfl: 2  •  levothyroxine (SYNTHROID, LEVOTHROID) 137 MCG tablet, , Disp: , Rfl:   •  montelukast (SINGULAIR) 10 MG tablet, TAKE ONE TABLET BY MOUTH ONCE NIGHTLY, Disp: 30 tablet, Rfl: 2  •  pantoprazole (PROTONIX) 40 MG EC tablet, Take 1 tablet by mouth Daily., Disp: 30 tablet, Rfl: 3    Review of Systems   Constitutional: Negative for chills, fatigue and fever.   HENT: Positive for congestion, postnasal drip, sinus pressure, sneezing and sore throat. Negative for ear pain and trouble swallowing.    Eyes: Positive for discharge and itching (irritation/ dry). Negative for pain and redness.   Respiratory: Positive for cough. Negative for shortness of breath and wheezing.    Cardiovascular: Negative for chest pain.    Gastrointestinal: Negative for abdominal pain, nausea and vomiting.   Genitourinary: Negative for dysuria.   Neurological: Negative for dizziness and headache.       Objective   Vitals:    11/05/19 1216   BP: 110/70   Pulse: 65   Resp: 16   Temp: 97.5 °F (36.4 °C)   SpO2: 96%     Physical Exam   Constitutional: She appears well-developed and well-nourished.   HENT:   Head: Normocephalic and atraumatic.   Right Ear: Tympanic membrane, external ear and ear canal normal.   Left Ear: Tympanic membrane, external ear and ear canal normal.   Mouth/Throat: Oropharynx is clear and moist and mucous membranes are normal.   Eyes: Conjunctivae are normal. Right eye exhibits no chemosis and no discharge. Left eye exhibits no chemosis and no discharge. Right conjunctiva is not injected. Left conjunctiva is not injected.   Neck: Neck supple.   Cardiovascular: Normal rate and regular rhythm.   No murmur heard.  Pulmonary/Chest: Effort normal and breath sounds normal. She has no wheezes. She has no rales.   Lymphadenopathy:     She has no cervical adenopathy.   Skin: No rash noted.   Psychiatric: She has a normal mood and affect. Her behavior is normal.             Assessment/Plan   Liliana was seen today for sinus problem.    Diagnoses and all orders for this visit:    Seasonal allergic rhinitis, unspecified trigger        Advised Xyzal 5mg nightly x1-2 weeks (not covered by insurance, OTC only), Mucinex-D 2x/ daily with large glass of water, and Alaway drops for eye irritation.  Advised warm tea with honey for further relief of her dry irritated throat.        Return if symptoms worsen or fail to improve.

## 2019-11-27 ENCOUNTER — HOSPITAL ENCOUNTER (OUTPATIENT)
Dept: MAMMOGRAPHY | Facility: HOSPITAL | Age: 54
Discharge: HOME | End: 2019-11-27
Admitting: OBSTETRICS & GYNECOLOGY

## 2019-11-27 DIAGNOSIS — Z12.31 VISIT FOR SCREENING MAMMOGRAM: ICD-10-CM

## 2019-11-27 PROCEDURE — 77067 SCR MAMMO BI INCL CAD: CPT

## 2019-11-27 PROCEDURE — 77063 BREAST TOMOSYNTHESIS BI: CPT

## 2019-11-27 PROCEDURE — 77063 BREAST TOMOSYNTHESIS BI: CPT | Performed by: RADIOLOGY

## 2019-11-27 PROCEDURE — 77067 SCR MAMMO BI INCL CAD: CPT | Performed by: RADIOLOGY

## 2019-12-09 ENCOUNTER — OFFICE VISIT (OUTPATIENT)
Dept: INTERNAL MEDICINE | Facility: CLINIC | Age: 54
End: 2019-12-09

## 2019-12-09 VITALS
DIASTOLIC BLOOD PRESSURE: 60 MMHG | OXYGEN SATURATION: 98 % | BODY MASS INDEX: 37.24 KG/M2 | TEMPERATURE: 97.9 F | RESPIRATION RATE: 20 BRPM | WEIGHT: 210.25 LBS | SYSTOLIC BLOOD PRESSURE: 98 MMHG | HEART RATE: 54 BPM

## 2019-12-09 DIAGNOSIS — K21.9 GASTROESOPHAGEAL REFLUX DISEASE WITHOUT ESOPHAGITIS: Primary | ICD-10-CM

## 2019-12-09 DIAGNOSIS — M79.645 THUMB PAIN, LEFT: ICD-10-CM

## 2019-12-09 PROCEDURE — 99213 OFFICE O/P EST LOW 20 MIN: CPT | Performed by: INTERNAL MEDICINE

## 2019-12-09 NOTE — PROGRESS NOTES
Subjective   Liliana Quevedo is a 54 y.o. female.     History of Present Illness     The following portions of the patient's history were reviewed and updated as appropriate: allergies, current medications, past family history, past medical history, past social history, past surgical history and problem list.    1 gerd-  Chronic and controlled.  Patient says that the Protonix is no longer covered by insurance and she was wondering about other PPI therapy medications available.  Patient denies any abdominal discomfort, no nausea, no vomiting, no reflux.    2 left thumb pain   Duration 2 months  Sx Patient says that she has been having this for the past 2 months, but in the past 1  Week  It has been gradually getting worse     Review of Systems   All other systems reviewed and are negative.      Objective   Physical Exam   Constitutional: She is oriented to person, place, and time. She appears well-developed and well-nourished.   HENT:   Head: Normocephalic.   Right Ear: External ear normal.   Left Ear: External ear normal.   Nose: Nose normal.   Mouth/Throat: Oropharynx is clear and moist.   Eyes: Pupils are equal, round, and reactive to light. Conjunctivae and EOM are normal.   Neck: Normal range of motion. Neck supple.   Cardiovascular: Normal rate, regular rhythm and normal heart sounds.   Pulmonary/Chest: Effort normal and breath sounds normal.   Musculoskeletal: Normal range of motion.   Neurological: She is alert and oriented to person, place, and time.   Nursing note and vitals reviewed.        Assessment/Plan   Liliana was seen today for med refill.    Diagnoses and all orders for this visit:    Gastroesophageal reflux disease without esophagitis-recommend trying 1 of the other over-the-counter PPIs either omeprazole, Prevacid, Prilosec to see if 1 of these medications will help    Thumb pain, left-symptoms are more suggestive of tendinitis    Other orders  -     diclofenac (VOLTAREN) 1 % gel gel; Apply 4 g  topically to the appropriate area as directed 3 (Three) Times a Day.

## 2019-12-15 DIAGNOSIS — J30.9 ALLERGIC RHINITIS: ICD-10-CM

## 2019-12-16 RX ORDER — MONTELUKAST SODIUM 10 MG/1
TABLET ORAL
Qty: 30 TABLET | Refills: 1 | Status: SHIPPED | OUTPATIENT
Start: 2019-12-16 | End: 2020-02-12

## 2020-02-12 DIAGNOSIS — J30.9 ALLERGIC RHINITIS: ICD-10-CM

## 2020-02-12 RX ORDER — MONTELUKAST SODIUM 10 MG/1
TABLET ORAL
Qty: 30 TABLET | Refills: 0 | Status: SHIPPED | OUTPATIENT
Start: 2020-02-12 | End: 2020-03-12

## 2020-03-12 DIAGNOSIS — J30.9 ALLERGIC RHINITIS: ICD-10-CM

## 2020-03-12 RX ORDER — MONTELUKAST SODIUM 10 MG/1
TABLET ORAL
Qty: 30 TABLET | Refills: 0 | Status: SHIPPED | OUTPATIENT
Start: 2020-03-12 | End: 2020-07-22

## 2020-03-30 DIAGNOSIS — F33.0 MILD EPISODE OF RECURRENT MAJOR DEPRESSIVE DISORDER (HCC): ICD-10-CM

## 2020-03-30 RX ORDER — CITALOPRAM 40 MG/1
TABLET ORAL
Qty: 90 TABLET | Refills: 1 | Status: SHIPPED | OUTPATIENT
Start: 2020-03-30 | End: 2020-09-28

## 2020-06-09 ENCOUNTER — OFFICE VISIT (OUTPATIENT)
Dept: INTERNAL MEDICINE | Facility: CLINIC | Age: 55
End: 2020-06-09

## 2020-06-09 VITALS
HEART RATE: 68 BPM | RESPIRATION RATE: 21 BRPM | DIASTOLIC BLOOD PRESSURE: 62 MMHG | SYSTOLIC BLOOD PRESSURE: 104 MMHG | WEIGHT: 218 LBS | BODY MASS INDEX: 38.62 KG/M2

## 2020-06-09 DIAGNOSIS — K21.9 GASTROESOPHAGEAL REFLUX DISEASE WITHOUT ESOPHAGITIS: Primary | ICD-10-CM

## 2020-06-09 PROCEDURE — 99213 OFFICE O/P EST LOW 20 MIN: CPT | Performed by: INTERNAL MEDICINE

## 2020-06-09 NOTE — PROGRESS NOTES
Subjective   Liliana Quevedo is a 54 y.o. female.     History of Present Illness     The following portions of the patient's history were reviewed and updated as appropriate: allergies, current medications, past family history, past medical history, past social history, past surgical history and problem list.    GERD- chronic, recurrent.  Patient says that she been taking famotidine and this is helped partially along with her diet and lifestyle modification.  Patient says that she is also lost weight and also with exercising.  Overall she says that she is having intermittent episodes of breakthrough symptoms.    Review of Systems   All other systems reviewed and are negative.      Objective   Physical Exam   Constitutional: She is oriented to person, place, and time. She appears well-developed and well-nourished.   HENT:   Head: Normocephalic.   Nose: Nose normal.   Eyes: Pupils are equal, round, and reactive to light. Conjunctivae and EOM are normal.   Neck: Normal range of motion. Neck supple.   Cardiovascular: Normal rate, regular rhythm and normal heart sounds.   Pulmonary/Chest: Effort normal and breath sounds normal.   Musculoskeletal: Normal range of motion.   Neurological: She is alert and oriented to person, place, and time.   Skin: Skin is warm.   Nursing note and vitals reviewed.        Assessment/Plan   Liliana was seen today for gastroesophageal reflux disease without esophagitis.    Diagnoses and all orders for this visit:    Gastroesophageal reflux disease without esophagitis    Recommend patient take Protonix along with the famotidine as this may help decrease her acid reflux symptoms.  Be sure to monitor diet very closely.

## 2020-07-07 ENCOUNTER — OFFICE VISIT (OUTPATIENT)
Dept: NEUROLOGY | Facility: CLINIC | Age: 55
End: 2020-07-07

## 2020-07-07 VITALS
DIASTOLIC BLOOD PRESSURE: 68 MMHG | BODY MASS INDEX: 38.98 KG/M2 | OXYGEN SATURATION: 98 % | TEMPERATURE: 97.5 F | WEIGHT: 220 LBS | HEART RATE: 59 BPM | SYSTOLIC BLOOD PRESSURE: 102 MMHG | HEIGHT: 63 IN

## 2020-07-07 DIAGNOSIS — G47.33 OBSTRUCTIVE SLEEP APNEA SYNDROME: Primary | ICD-10-CM

## 2020-07-07 PROCEDURE — 99212 OFFICE O/P EST SF 10 MIN: CPT | Performed by: PSYCHIATRY & NEUROLOGY

## 2020-07-07 NOTE — PROGRESS NOTES
"Subjective   Liliana Quevedo is a 54 y.o. female.     Chief Complaint   Patient presents with   • Obstructive Sleep Apnea Syndrome     1yr follow up        History of Present Illness     54 y.o. female seen in follow up for ABDULLAHI.  Last visit on 7/2/20 renewed CPAP mask and supplies.      11/2012.      Continues on CPAP 15 cm H20.    S/P Gastric sleeve 2/2017    Recent wt gain 13 lbs.     Compliant with CPAP and receiving benefit.  Improved sleep quality and daytime alertness.      Renew mask and supplies on scheduled basis.      DME:  [employer]     Allergies   Allergen Reactions   • Ampicillin Hives   • Bactrim [Sulfamethoxazole-Trimethoprim] Hives and GI Intolerance   • Sulfa Antibiotics Hives     Hot and cold flashes, N&V, diarrhea       Current Outpatient Medications on File Prior to Visit   Medication Sig Dispense Refill   • citalopram (CeleXA) 40 MG tablet TAKE ONE TABLET BY MOUTH DAILY 90 tablet 1   • diclofenac (VOLTAREN) 1 % gel gel Apply 4 g topically to the appropriate area as directed 3 (Three) Times a Day. 100 g 2   • levothyroxine (SYNTHROID, LEVOTHROID) 137 MCG tablet      • montelukast (SINGULAIR) 10 MG tablet TAKE ONE TABLET BY MOUTH ONCE NIGHTLY 30 tablet 0   • pantoprazole (PROTONIX) 40 MG EC tablet Take 1 tablet by mouth Daily. 30 tablet 3     No current facility-administered medications on file prior to visit.        Past Medical History:   Diagnosis Date   • Acute sinusitis     Assessed By: Geremias Ramesh (Internal Medicine); Last Assessed: 23 Jan 2015   • Allergic rhinitis    • Carpal tunnel syndrome    • Colon polyp     SIGMOID   • DDD (degenerative disc disease), lumbar    • De Quervain's tenosynovitis    • Depression    • Edema     Assessed By: Geremias Ramesh (Internal Medicine); Last Assessed: 26 Nov 2014   • Elevated alkaline phosphatase level     102   • Fatigue    • Gastric ulcer    • GERD (gastroesophageal reflux disease)     on Protonix.  Says sx's not bad even if misses meds, \"depends on " "what I eat\".  EGD [facility] 6/16 40 cm, path DE reflux.  serum h. pyl neg   • Hematuria    • Hyperlipidemia    • Hypertension     per prim MD note   • Hypothyroidism     w/ hx thyromegaly s/p CASTILLO.    • Incomplete right bundle branch block    • Low back pain    • Mild cardiomegaly    • Morbid obesity (CMS/HCC)    • Nephrolithiasis    • Osteoarthritis    • Peripheral edema    • Polyp of sigmoid colon    • Scoliosis    • Sleep apnea     CPAP compliant   • Tendinitis    • Viral URI     Assessed By: Geremias Ramesh (Internal Medicine); Last Assessed: 23 Jan 2015   • Vitamin D deficiency    • Weight gain     Assessed By: Geremias Ramesh (Internal Medicine); Last Assessed: 26 Nov 2014       Past Surgical History:   Procedure Laterality Date   • ANKLE SURGERY Right 2000    dislocation repair w/ plate    • CARPAL TUNNEL RELEASE Right    • COLONOSCOPY  2011    screening   • CYST REMOVAL Right     right index finger   • GASTRIC SLEEVE LAPAROSCOPIC N/A 2/22/2017    Procedure: GASTRIC SLEEVE LAPAROSCOPIC;  Surgeon: Sergio Gibbons MD;  Location: [facility];  Service:    • HYSTERECTOMY Bilateral 2004    (open) w/ oophorectomy - uterine fibroids and ovarian cysts   • KNEE SURGERY Left 2015     ligament repair   • LAPAROSCOPIC CHOLECYSTECTOMY  2011    for stones   • NOSE SURGERY     • OOPHORECTOMY Bilateral 2004       Social History     Socioeconomic History   • Marital status:      Spouse name: Not on file   • Number of children: Not on file   • Years of education: Not on file   • Highest education level: Not on file   Tobacco Use   • Smoking status: Never Smoker   • Smokeless tobacco: Never Used   Substance and Sexual Activity   • Alcohol use: No   • Drug use: No   • Sexual activity: Defer   Social History Narrative    Lives in [place] w/ and inlaws    Working full time @[employer]        Review of Systems   Constitutional: Negative for unexpected weight change.   Respiratory: Negative for shortness of breath.  " "  Neurological: Positive for dizziness.       Objective   Blood pressure 102/68, pulse 59, temperature 97.5 °F (36.4 °C), height 160 cm (62.99\"), weight 99.8 kg (220 lb), SpO2 98 %, not currently breastfeeding.    Physical Exam   Constitutional: She is oriented to person, place, and time. She appears well-developed and well-nourished.   HENT:   Head: Normocephalic and atraumatic.   Eyes: Pupils are equal, round, and reactive to light. EOM are normal.   Pulmonary/Chest: Effort normal.   Neurological: She is oriented to person, place, and time. She has a normal Finger-Nose-Finger Test and a normal Heel to Shin Test. Gait normal.   Skin: Skin is warm and dry.   Psychiatric: She has a normal mood and affect. Her speech is normal and behavior is normal. Judgment and thought content normal.   Nursing note and vitals reviewed.      Neurologic Exam     Mental Status   Oriented to person, place, and time.   Speech: speech is normal   Level of consciousness: alert  Knowledge: consistent with education.   Able to name object. Normal comprehension.     Cranial Nerves     CN II   Visual fields full to confrontation.     CN III, IV, VI   Pupils are equal, round, and reactive to light.  Extraocular motions are normal.     CN V   Facial sensation intact.     CN VII   Facial expression full, symmetric.     CN VIII   CN VIII normal.     CN IX, X   CN IX normal.   CN X normal.   Palate: symmetric    CN XI   CN XI normal.     CN XII   CN XII normal.         Motor Exam   Muscle bulk: normal  Right arm pronator drift: absent  Left arm pronator drift: absent    Strength   Strength 5/5 except as noted.     Sensory Exam   Light touch normal.     Gait, Coordination, and Reflexes     Gait  Gait: normal    Coordination   Finger to nose coordination: normal  Heel to shin coordination: normal    Tremor   Resting tremor: absent  Intention tremor: absent  Action tremor: absentNormal fine finger movements       Assessment/Plan     Liliana was seen " today for obstructive sleep apnea syndrome.    Diagnoses and all orders for this visit:    Obstructive sleep apnea syndrome      Continue CPAP 15 mc H20

## 2020-07-19 DIAGNOSIS — J30.9 ALLERGIC RHINITIS: ICD-10-CM

## 2020-07-21 DIAGNOSIS — J30.9 ALLERGIC RHINITIS: ICD-10-CM

## 2020-07-22 RX ORDER — MONTELUKAST SODIUM 10 MG/1
10 TABLET ORAL NIGHTLY
Qty: 30 TABLET | Refills: 0 | Status: SHIPPED | OUTPATIENT
Start: 2020-07-22 | End: 2020-09-15

## 2020-07-22 RX ORDER — MONTELUKAST SODIUM 10 MG/1
TABLET ORAL
Qty: 30 TABLET | Refills: 0 | Status: SHIPPED | OUTPATIENT
Start: 2020-07-22 | End: 2020-07-22 | Stop reason: SDUPTHER

## 2020-08-14 ENCOUNTER — OFFICE VISIT (OUTPATIENT)
Dept: INTERNAL MEDICINE | Facility: CLINIC | Age: 55
End: 2020-08-14

## 2020-08-14 VITALS
WEIGHT: 221.38 LBS | BODY MASS INDEX: 39.22 KG/M2 | SYSTOLIC BLOOD PRESSURE: 110 MMHG | RESPIRATION RATE: 20 BRPM | OXYGEN SATURATION: 98 % | DIASTOLIC BLOOD PRESSURE: 80 MMHG | TEMPERATURE: 98.6 F | HEART RATE: 64 BPM

## 2020-08-14 DIAGNOSIS — R42 VERTIGO: Primary | ICD-10-CM

## 2020-08-14 DIAGNOSIS — R42 DIZZINESS: ICD-10-CM

## 2020-08-14 PROCEDURE — 93000 ELECTROCARDIOGRAM COMPLETE: CPT | Performed by: INTERNAL MEDICINE

## 2020-08-14 PROCEDURE — 99214 OFFICE O/P EST MOD 30 MIN: CPT | Performed by: INTERNAL MEDICINE

## 2020-08-14 NOTE — PROGRESS NOTES
"Subjective   Liliana Quevedo is a 54 y.o. female.     History of Present Illness     The following portions of the patient's history were reviewed and updated as appropriate: allergies, current medications, past family history, past medical history, past social history, past surgical history and problem list.    Dizziness- Patient says that she has had two episodes within the 2-3 weeks.  Patient says that she was driving with her  when she had a unmistakable sensation of queasiness, lightheadedness, flushing sensation, \"it felt like my energy was just being drained \"and even presyncopal symptoms.   had to stop the car in order to make sure that she composed herself.  Patient had another episode that occurred while she was sitting and feeling like she was going to faint.  No shortness of breath, no chest pain, no palpitations, no other systemic symptoms.      Family History   Atrial fibrillation  CHF     Review of Systems   All other systems reviewed and are negative.      Objective   Physical Exam   Constitutional: She is oriented to person, place, and time. She appears well-developed and well-nourished.   HENT:   Head: Normocephalic.   Right Ear: External ear normal.   Left Ear: External ear normal.   Nose: Nose normal.   Mouth/Throat: Oropharynx is clear and moist.   Eyes: Pupils are equal, round, and reactive to light. Conjunctivae and EOM are normal.   Neck: Normal range of motion. Neck supple.   Cardiovascular: Normal rate, regular rhythm, normal heart sounds and intact distal pulses.   Pulmonary/Chest: Effort normal and breath sounds normal.   Neurological: She is alert and oriented to person, place, and time.   Skin: Skin is warm.   Psychiatric: She has a normal mood and affect. Her behavior is normal. Judgment and thought content normal.   Nursing note and vitals reviewed.        Assessment/Plan   Liliana was seen today for dizziness.    Diagnoses and all orders for this visit:    Spent 30 " minutes face-to-face with patient, 25 minutes use to review physical exam, EKG findings, and discuss treatment and management approach.    Vertigo  -     ECG 12 Lead  -     Cancel: Ambulatory Referral to Cardiology  -     Ambulatory Referral to Cardiology    Dizziness  -     ECG 12 Lead; Future  -     ECG 12 Lead  -     Cancel: Ambulatory Referral to Cardiology  -     Ambulatory Referral to Cardiology          ECG 12 Lead  Date/Time: 8/14/2018 12:31 PM  Performed by: Geremias Ramesh MD  Authorized by: Geremias Ramesh MD   Comparison: compared with previous ECG from 9/21/2018  Comparison to previous ECG: Unchanged, previous mild RBBB  Rhythm: sinus bradycardia  Rate: normal  Conduction: conduction normal  ST Segments: ST segments normal  T Waves: T waves normal  QRS axis: normal  Other: no other findings  Other findings: T wave abnormality    Clinical impression: non-specific ECG

## 2020-09-03 ENCOUNTER — CONSULT (OUTPATIENT)
Dept: CARDIOLOGY | Facility: CLINIC | Age: 55
End: 2020-09-03

## 2020-09-03 VITALS
BODY MASS INDEX: 40.85 KG/M2 | TEMPERATURE: 97.7 F | WEIGHT: 222 LBS | HEART RATE: 58 BPM | SYSTOLIC BLOOD PRESSURE: 110 MMHG | DIASTOLIC BLOOD PRESSURE: 68 MMHG | OXYGEN SATURATION: 99 % | HEIGHT: 62 IN

## 2020-09-03 DIAGNOSIS — R55 SYNCOPE AND COLLAPSE: ICD-10-CM

## 2020-09-03 DIAGNOSIS — R00.1 BRADYCARDIA: Primary | ICD-10-CM

## 2020-09-03 DIAGNOSIS — R55 NEAR SYNCOPE: Primary | ICD-10-CM

## 2020-09-03 DIAGNOSIS — R42 DIZZINESS: ICD-10-CM

## 2020-09-03 DIAGNOSIS — K21.9 CHEST PAIN DUE TO GERD: ICD-10-CM

## 2020-09-03 DIAGNOSIS — R07.9 CHEST PAIN, UNSPECIFIED TYPE: ICD-10-CM

## 2020-09-03 DIAGNOSIS — R00.1 SINUS BRADYCARDIA: ICD-10-CM

## 2020-09-03 DIAGNOSIS — R07.9 CHEST PAIN DUE TO GERD: ICD-10-CM

## 2020-09-03 DIAGNOSIS — E78.00 PURE HYPERCHOLESTEROLEMIA: ICD-10-CM

## 2020-09-03 PROCEDURE — 99244 OFF/OP CNSLTJ NEW/EST MOD 40: CPT | Performed by: PHYSICIAN ASSISTANT

## 2020-09-03 PROCEDURE — 93000 ELECTROCARDIOGRAM COMPLETE: CPT | Performed by: PHYSICIAN ASSISTANT

## 2020-09-15 DIAGNOSIS — J30.9 ALLERGIC RHINITIS: ICD-10-CM

## 2020-09-15 RX ORDER — MONTELUKAST SODIUM 10 MG/1
TABLET ORAL
Qty: 30 TABLET | Refills: 0 | Status: SHIPPED | OUTPATIENT
Start: 2020-09-15 | End: 2020-10-13

## 2020-09-28 DIAGNOSIS — F33.0 MILD EPISODE OF RECURRENT MAJOR DEPRESSIVE DISORDER (HCC): ICD-10-CM

## 2020-09-28 RX ORDER — CITALOPRAM 40 MG/1
TABLET ORAL
Qty: 30 TABLET | Refills: 0 | Status: SHIPPED | OUTPATIENT
Start: 2020-09-28 | End: 2020-10-29

## 2020-10-05 ENCOUNTER — OFFICE VISIT (OUTPATIENT)
Dept: INTERNAL MEDICINE | Facility: CLINIC | Age: 55
End: 2020-10-05

## 2020-10-05 VITALS
SYSTOLIC BLOOD PRESSURE: 106 MMHG | WEIGHT: 221 LBS | TEMPERATURE: 96.8 F | BODY MASS INDEX: 40.42 KG/M2 | RESPIRATION RATE: 20 BRPM | HEART RATE: 60 BPM | OXYGEN SATURATION: 95 % | DIASTOLIC BLOOD PRESSURE: 70 MMHG

## 2020-10-05 DIAGNOSIS — E55.9 VITAMIN D DEFICIENCY: ICD-10-CM

## 2020-10-05 DIAGNOSIS — J30.2 SEASONAL ALLERGIES: ICD-10-CM

## 2020-10-05 DIAGNOSIS — E03.9 HYPOTHYROIDISM, UNSPECIFIED TYPE: Primary | ICD-10-CM

## 2020-10-05 DIAGNOSIS — Z13.220 LIPID SCREENING: ICD-10-CM

## 2020-10-05 DIAGNOSIS — F33.0 MILD EPISODE OF RECURRENT MAJOR DEPRESSIVE DISORDER (HCC): ICD-10-CM

## 2020-10-05 LAB
25(OH)D3 SERPL-MCNC: 48.5 NG/ML (ref 30–100)
ALBUMIN SERPL-MCNC: 4.3 G/DL (ref 3.5–5.2)
ALBUMIN/GLOB SERPL: 1.5 G/DL
ALP SERPL-CCNC: 114 U/L (ref 39–117)
ALT SERPL W P-5'-P-CCNC: 13 U/L (ref 1–33)
ANION GAP SERPL CALCULATED.3IONS-SCNC: 9 MMOL/L (ref 5–15)
AST SERPL-CCNC: 19 U/L (ref 1–32)
BILIRUB SERPL-MCNC: 0.4 MG/DL (ref 0–1.2)
BUN SERPL-MCNC: 11 MG/DL (ref 6–20)
BUN/CREAT SERPL: 13.8 (ref 7–25)
CALCIUM SPEC-SCNC: 9.5 MG/DL (ref 8.6–10.5)
CHLORIDE SERPL-SCNC: 102 MMOL/L (ref 98–107)
CHOLEST SERPL-MCNC: 190 MG/DL (ref 0–200)
CO2 SERPL-SCNC: 29 MMOL/L (ref 22–29)
CREAT SERPL-MCNC: 0.8 MG/DL (ref 0.57–1)
DEPRECATED RDW RBC AUTO: 41.3 FL (ref 37–54)
ERYTHROCYTE [DISTWIDTH] IN BLOOD BY AUTOMATED COUNT: 13.2 % (ref 12.3–15.4)
GFR SERPL CREATININE-BSD FRML MDRD: 75 ML/MIN/1.73
GLOBULIN UR ELPH-MCNC: 2.9 GM/DL
GLUCOSE SERPL-MCNC: 84 MG/DL (ref 65–99)
HCT VFR BLD AUTO: 40.6 % (ref 34–46.6)
HDLC SERPL-MCNC: 52 MG/DL (ref 40–60)
HGB BLD-MCNC: 13.6 G/DL (ref 12–15.9)
LDLC SERPL CALC-MCNC: 114 MG/DL (ref 0–100)
LDLC/HDLC SERPL: 2.18 {RATIO}
MCH RBC QN AUTO: 29.1 PG (ref 26.6–33)
MCHC RBC AUTO-ENTMCNC: 33.5 G/DL (ref 31.5–35.7)
MCV RBC AUTO: 86.9 FL (ref 79–97)
PLATELET # BLD AUTO: 209 10*3/MM3 (ref 140–450)
PMV BLD AUTO: 11.1 FL (ref 6–12)
POTASSIUM SERPL-SCNC: 4 MMOL/L (ref 3.5–5.2)
PROT SERPL-MCNC: 7.2 G/DL (ref 6–8.5)
RBC # BLD AUTO: 4.67 10*6/MM3 (ref 3.77–5.28)
SODIUM SERPL-SCNC: 140 MMOL/L (ref 136–145)
T4 FREE SERPL-MCNC: 1.33 NG/DL (ref 0.93–1.7)
TRIGL SERPL-MCNC: 122 MG/DL (ref 0–150)
TSH SERPL DL<=0.05 MIU/L-ACNC: 4.11 UIU/ML (ref 0.27–4.2)
VLDLC SERPL-MCNC: 24.4 MG/DL (ref 5–40)
WBC # BLD AUTO: 4.34 10*3/MM3 (ref 3.4–10.8)

## 2020-10-05 PROCEDURE — 84439 ASSAY OF FREE THYROXINE: CPT | Performed by: INTERNAL MEDICINE

## 2020-10-05 PROCEDURE — 85027 COMPLETE CBC AUTOMATED: CPT | Performed by: INTERNAL MEDICINE

## 2020-10-05 PROCEDURE — 80053 COMPREHEN METABOLIC PANEL: CPT | Performed by: INTERNAL MEDICINE

## 2020-10-05 PROCEDURE — 84443 ASSAY THYROID STIM HORMONE: CPT | Performed by: INTERNAL MEDICINE

## 2020-10-05 PROCEDURE — 82306 VITAMIN D 25 HYDROXY: CPT | Performed by: INTERNAL MEDICINE

## 2020-10-05 PROCEDURE — 99214 OFFICE O/P EST MOD 30 MIN: CPT | Performed by: INTERNAL MEDICINE

## 2020-10-05 PROCEDURE — 36415 COLL VENOUS BLD VENIPUNCTURE: CPT | Performed by: INTERNAL MEDICINE

## 2020-10-05 PROCEDURE — 80061 LIPID PANEL: CPT | Performed by: INTERNAL MEDICINE

## 2020-10-05 NOTE — PROGRESS NOTES
Subjective   Liliana Quevedo is a 54 y.o. female.     History of Present Illness     The following portions of the patient's history were reviewed and updated as appropriate: allergies, current medications, past family history, past medical history, past social history, past surgical history and problem list.    1 depression-chronic and stable.  Patient reports no new onset of any worsening depression, suicidal ideations, or emotional liability threats towards everybody else.  Patient continues on the Celexa and has had no other issues at this time.    2 Hypothyroid-chronic and stable.  Patient continues on levothyroxine    3 seasonal allergiies-chronic and stable.  No recent exacerbations with her seasonal allergies    4 cardiology follow up.  Patient has been wearing a Holter monitor over the past 2 to 3 weeks    Review of Systems   All other systems reviewed and are negative.      Objective   Physical Exam  Vitals signs and nursing note reviewed.   Constitutional:       Appearance: Normal appearance.   HENT:      Head: Normocephalic and atraumatic.      Right Ear: Tympanic membrane normal.      Left Ear: Tympanic membrane normal.      Nose: Nose normal.      Mouth/Throat:      Mouth: Mucous membranes are moist.   Eyes:      Extraocular Movements: Extraocular movements intact.      Pupils: Pupils are equal, round, and reactive to light.   Neck:      Musculoskeletal: Normal range of motion and neck supple.   Cardiovascular:      Rate and Rhythm: Normal rate.      Pulses: Normal pulses.      Heart sounds: Normal heart sounds.   Pulmonary:      Effort: Pulmonary effort is normal.      Breath sounds: Normal breath sounds.   Abdominal:      General: Bowel sounds are normal.      Palpations: Abdomen is soft.   Neurological:      Mental Status: She is alert.           Assessment/Plan   Liliana was seen today for med refill and labs only.    Diagnoses and all orders for this visit:    Hypothyroidism, unspecified  type    Mild episode of recurrent major depressive disorder (CMS/HCC)  -     CBC (No Diff)  -     Comprehensive Metabolic Panel  -     T4, Free  -     TSH    Seasonal allergies    Lipid screening  -     Lipid Panel    Vitamin D deficiency  -     Vitamin D 25 hydroxy; Future  -     Vitamin D 25 hydroxy

## 2020-10-06 ENCOUNTER — TELEPHONE (OUTPATIENT)
Dept: CARDIOLOGY | Facility: CLINIC | Age: 55
End: 2020-10-06

## 2020-10-06 NOTE — TELEPHONE ENCOUNTER
Joie Dejesus PA Ethington, Susan D, RN             This patient has a normal holter monitor.  Most of her symptoms of dizziness related to a normal sinus rhythm.   Can you please notify patient? Thank you.   SYED Topete      Called patient. Spoke to spouse. Notified of message above from Stacie LYNCH. Verbalized understanding.

## 2020-10-13 DIAGNOSIS — J30.9 ALLERGIC RHINITIS: ICD-10-CM

## 2020-10-13 RX ORDER — MONTELUKAST SODIUM 10 MG/1
TABLET ORAL
Qty: 90 TABLET | Refills: 0 | Status: SHIPPED | OUTPATIENT
Start: 2020-10-13 | End: 2021-01-19

## 2020-10-29 DIAGNOSIS — F33.0 MILD EPISODE OF RECURRENT MAJOR DEPRESSIVE DISORDER (HCC): ICD-10-CM

## 2020-10-31 RX ORDER — CITALOPRAM 40 MG/1
TABLET ORAL
Qty: 90 TABLET | Refills: 3 | Status: SHIPPED | OUTPATIENT
Start: 2020-10-31 | End: 2021-10-06 | Stop reason: SDUPTHER

## 2020-11-05 ENCOUNTER — OFFICE VISIT (OUTPATIENT)
Dept: CARDIOLOGY | Facility: CLINIC | Age: 55
End: 2020-11-05

## 2020-11-05 ENCOUNTER — TRANSCRIBE ORDERS (OUTPATIENT)
Dept: ADMINISTRATIVE | Facility: HOSPITAL | Age: 55
End: 2020-11-05

## 2020-11-05 VITALS
BODY MASS INDEX: 35.99 KG/M2 | OXYGEN SATURATION: 98 % | HEIGHT: 65 IN | DIASTOLIC BLOOD PRESSURE: 66 MMHG | WEIGHT: 216 LBS | TEMPERATURE: 98 F | SYSTOLIC BLOOD PRESSURE: 116 MMHG | HEART RATE: 53 BPM

## 2020-11-05 DIAGNOSIS — Z12.31 VISIT FOR SCREENING MAMMOGRAM: Primary | ICD-10-CM

## 2020-11-05 DIAGNOSIS — R42 DIZZINESS: ICD-10-CM

## 2020-11-05 DIAGNOSIS — R00.2 PALPITATIONS: Primary | ICD-10-CM

## 2020-11-05 PROCEDURE — 99212 OFFICE O/P EST SF 10 MIN: CPT | Performed by: PHYSICIAN ASSISTANT

## 2020-11-05 NOTE — PROGRESS NOTES
[facility] - Office Note  Liliana Quevedo         [address] [facility] [address]  1965   [phone] (home)      LOCATION:  [place] office.  Visit Type: Follow Up.    PCP:  Geremias Ramesh MD    11/05/20   Liliana Quevedo is a 55 y.o.  female  currently employed.      Chief Complaint:   FU on test results for dizziness, chest pain.      PROBLEM LIST/PMHx:  1.  Dizziness with Near Syncope   A.  Normal BioTel 30 day event monitor 10/2020 without arrhythmias or pauses.  2.  Dyslipidemia  3.  ABDULLAHI, CPAP compliant  4.  Hypothyroid  5.  Obesity, s/p remote gastric sleeve 2/2017  6.  Depression  7.  GERD with h/o gastric ulcer  8.  Seasonal Allergies/Rhinitis  9.  Sinus Bradycardia, asymptomatic.  10.  Chest Pain   A.  Cardiolite MPS x 2 ordered 2018/2020  - patient did not pursue study.      Allergies   Allergen Reactions   • Ampicillin Hives   • Bactrim [Sulfamethoxazole-Trimethoprim] Hives and GI Intolerance   • Sulfa Antibiotics Hives     Hot and cold flashes, N&V, diarrhea         Current Outpatient Medications:   •  citalopram (CeleXA) 40 MG tablet, TAKE ONE TABLET BY MOUTH DAILY, Disp: 90 tablet, Rfl: 3  •  diclofenac (VOLTAREN) 1 % gel gel, Apply 4 g topically to the appropriate area as directed 3 (Three) Times a Day., Disp: 100 g, Rfl: 2  •  levothyroxine (SYNTHROID, LEVOTHROID) 137 MCG tablet, Take 137 mcg by mouth Daily., Disp: , Rfl:   •  montelukast (SINGULAIR) 10 MG tablet, TAKE ONE TABLET BY MOUTH ONCE NIGHTLY, Disp: 90 tablet, Rfl: 0  •  pantoprazole (PROTONIX) 40 MG EC tablet, Take 1 tablet by mouth Daily., Disp: 30 tablet, Rfl: 3    HPI  Liliana Quevedo is here today for follow up on dizziness and chest pain.  We saw her in consultation at which time we ordered a monitor and stress test.  She never had the stress test done.  Her monitor was read as normal and she was notified of results via phone.  She presents today with continued dizziness but no pre  "syncope.  She states it's the same feeling she's had recently and in the past.  She was symptomatic while wearing the monitor but all symptoms corresponded to NSR.  No arrhythmias or pauses were noted on the monitor.  She states just sitting still she can experience dizziness.  Sometimes the room spins, others it does not.  She was worked up for \"vertigo\" 2 years ago with and MRI, carotid duplex, balance study.  She was told everything was normal.  She is very concerned and tired from feeling poor all of the time.            The following portions of the patient's history were reviewed in the chart and updated as appropriate: allergies, current medications, past family history, past medical history, past social history, past surgical history and problem list.    Review of Systems   Constitution: Positive for malaise/fatigue. Negative for chills, fever, night sweats and weight loss.   Cardiovascular: Negative for chest pain, irregular heartbeat, leg swelling and near-syncope.   Respiratory: Negative for cough and shortness of breath.    Neurological: Positive for dizziness, headaches and light-headedness. Negative for focal weakness, loss of balance, sensory change and weakness.   All other systems reviewed and are negative.            height is 165.1 cm (65\") and weight is 98 kg (216 lb). Her temperature is 98 °F (36.7 °C). Her blood pressure is 116/66 and her pulse is 53. Her oxygen saturation is 98%.   Constitutional:       Appearance: Normal appearance. Well-developed.   Pulmonary:      Effort: Pulmonary effort is normal.      Breath sounds: Normal breath sounds. No wheezing. No rhonchi. No rales.   Cardiovascular:      Normal rate. Regular rhythm.   Pulses:     Intact distal pulses.   Abdominal:      General: Bowel sounds are normal.      Palpations: Abdomen is soft.   Neurological:      Mental Status: Alert.           Procedures     Assessment/ Plan   Diagnoses and all orders for this visit:    1. Palpitations " (Primary):  Reviewed MRI, carotid duplex reports from 2018.  Reviewed monitor results from last month in that symptoms correlated with NSR.  I will refer to ENT here on [facility] for further evaluation of vertigo.  Will also repeat MRI to rule out any changes or need for concern.  She is to follow with PCP/ENT moving forward - cardiology will see on PRN basis.        Joie Dejesus PA-C functioning independently.  11/5/2020 14:27 EST

## 2020-11-19 ENCOUNTER — HOSPITAL ENCOUNTER (OUTPATIENT)
Dept: MRI IMAGING | Facility: HOSPITAL | Age: 55
Discharge: HOME | End: 2020-11-19
Admitting: PHYSICIAN ASSISTANT

## 2020-11-19 DIAGNOSIS — R42 DIZZINESS: ICD-10-CM

## 2020-11-19 PROCEDURE — 70551 MRI BRAIN STEM W/O DYE: CPT

## 2020-11-23 ENCOUNTER — TELEPHONE (OUTPATIENT)
Dept: CARDIOLOGY | Facility: CLINIC | Age: 55
End: 2020-11-23

## 2020-11-24 NOTE — TELEPHONE ENCOUNTER
Very small abnormality of unclear significance.  Would have her follow-up with neurology for further evaluation.

## 2020-12-02 DIAGNOSIS — R55 NEAR SYNCOPE: ICD-10-CM

## 2020-12-02 DIAGNOSIS — R42 DIZZINESS: Primary | ICD-10-CM

## 2020-12-02 DIAGNOSIS — R07.9 CHEST PAIN, UNSPECIFIED TYPE: ICD-10-CM

## 2020-12-07 ENCOUNTER — HOSPITAL ENCOUNTER (OUTPATIENT)
Dept: CARDIOLOGY | Facility: HOSPITAL | Age: 55
Discharge: HOME | End: 2020-12-07
Admitting: PHYSICIAN ASSISTANT

## 2020-12-07 DIAGNOSIS — R42 DIZZINESS: ICD-10-CM

## 2020-12-07 DIAGNOSIS — R55 NEAR SYNCOPE: ICD-10-CM

## 2020-12-07 LAB
BH CV ECHO MEAS - BSA(HAYCOCK): 2.1 M^2
BH CV ECHO MEAS - BSA: 2 M^2
BH CV ECHO MEAS - BZI_BMI: 35.1 KILOGRAMS/M^2
BH CV ECHO MEAS - BZI_METRIC_HEIGHT: 165.1 CM
BH CV ECHO MEAS - BZI_METRIC_WEIGHT: 95.7 KG
BH CV XLRA MEAS LEFT DIST CCA EDV: 19.3 CM/SEC
BH CV XLRA MEAS LEFT DIST CCA PSV: 68.8 CM/SEC
BH CV XLRA MEAS LEFT ICA/CCA RATIO: 0.87
BH CV XLRA MEAS LEFT MID CCA EDV: 17.7 CM/SEC
BH CV XLRA MEAS LEFT MID CCA PSV: 83.7 CM/SEC
BH CV XLRA MEAS LEFT MID ICA EDV: 27.1 CM/SEC
BH CV XLRA MEAS LEFT MID ICA PSV: 59.7 CM/SEC
BH CV XLRA MEAS LEFT PROX CCA EDV: 14.5 CM/SEC
BH CV XLRA MEAS LEFT PROX CCA PSV: 81.7 CM/SEC
BH CV XLRA MEAS LEFT PROX ECA EDV: 10.6 CM/SEC
BH CV XLRA MEAS LEFT PROX ECA PSV: 47.9 CM/SEC
BH CV XLRA MEAS LEFT PROX ICA EDV: 12.2 CM/SEC
BH CV XLRA MEAS LEFT PROX ICA PSV: 38.9 CM/SEC
BH CV XLRA MEAS LEFT PROX SCLA EDV: 0 CM/SEC
BH CV XLRA MEAS LEFT PROX SCLA PSV: 49.9 CM/SEC
BH CV XLRA MEAS LEFT VERTEBRAL A EDV: 18.5 CM/SEC
BH CV XLRA MEAS LEFT VERTEBRAL A PSV: 49.9 CM/SEC
BH CV XLRA MEAS RIGHT DIST CCA EDV: 18.7 CM/SEC
BH CV XLRA MEAS RIGHT DIST CCA PSV: 80.5 CM/SEC
BH CV XLRA MEAS RIGHT ICA/CCA RATIO: 0.63
BH CV XLRA MEAS RIGHT MID CCA EDV: 19.9 CM/SEC
BH CV XLRA MEAS RIGHT MID CCA PSV: 91 CM/SEC
BH CV XLRA MEAS RIGHT MID ICA EDV: 20 CM/SEC
BH CV XLRA MEAS RIGHT MID ICA PSV: 49.1 CM/SEC
BH CV XLRA MEAS RIGHT PROX CCA EDV: 14 CM/SEC
BH CV XLRA MEAS RIGHT PROX CCA PSV: 76.4 CM/SEC
BH CV XLRA MEAS RIGHT PROX ECA EDV: 12.2 CM/SEC
BH CV XLRA MEAS RIGHT PROX ECA PSV: 66.4 CM/SEC
BH CV XLRA MEAS RIGHT PROX ICA EDV: 23.6 CM/SEC
BH CV XLRA MEAS RIGHT PROX ICA PSV: 50.7 CM/SEC
BH CV XLRA MEAS RIGHT PROX SCLA EDV: 0 CM/SEC
BH CV XLRA MEAS RIGHT PROX SCLA PSV: 65.9 CM/SEC
BH CV XLRA MEAS RIGHT VERTEBRAL A EDV: 14.9 CM/SEC
BH CV XLRA MEAS RIGHT VERTEBRAL A PSV: 35 CM/SEC

## 2020-12-07 PROCEDURE — 93880 EXTRACRANIAL BILAT STUDY: CPT

## 2020-12-07 PROCEDURE — 93880 EXTRACRANIAL BILAT STUDY: CPT | Performed by: INTERNAL MEDICINE

## 2020-12-08 ENCOUNTER — TELEPHONE (OUTPATIENT)
Dept: CARDIOLOGY | Facility: CLINIC | Age: 55
End: 2020-12-08

## 2020-12-08 NOTE — TELEPHONE ENCOUNTER
Joie Dejesus PA Ethington, Susan D, RN             Please call the patient and let her know that her carotid duplex looks good.  No stenosis of any significance  bilaterally.   Thank you.      Called patient to report message above from Stacie LYNCH. No answer. No voice mail.

## 2020-12-09 ENCOUNTER — HOSPITAL ENCOUNTER (OUTPATIENT)
Dept: CARDIOLOGY | Facility: HOSPITAL | Age: 55
Discharge: HOME | End: 2020-12-09
Admitting: PHYSICIAN ASSISTANT

## 2020-12-09 DIAGNOSIS — R55 NEAR SYNCOPE: ICD-10-CM

## 2020-12-09 DIAGNOSIS — R42 DIZZINESS: ICD-10-CM

## 2020-12-09 LAB
BH CV ECHO MEAS - AO MAX PG (FULL): -0.45 MMHG
BH CV ECHO MEAS - AO MAX PG: 6 MMHG
BH CV ECHO MEAS - AO MEAN PG (FULL): 1 MMHG
BH CV ECHO MEAS - AO MEAN PG: 3 MMHG
BH CV ECHO MEAS - AO ROOT AREA (BSA CORRECTED): 1.6
BH CV ECHO MEAS - AO ROOT AREA: 8 CM^2
BH CV ECHO MEAS - AO ROOT DIAM: 3.2 CM
BH CV ECHO MEAS - AO V2 MAX: 125 CM/SEC
BH CV ECHO MEAS - AO V2 MEAN: 78.1 CM/SEC
BH CV ECHO MEAS - AO V2 VTI: 28.1 CM
BH CV ECHO MEAS - ASC AORTA: 3 CM
BH CV ECHO MEAS - AVA(I,A): 2.8 CM^2
BH CV ECHO MEAS - AVA(I,D): 2.8 CM^2
BH CV ECHO MEAS - AVA(V,A): 3.2 CM^2
BH CV ECHO MEAS - AVA(V,D): 3.2 CM^2
BH CV ECHO MEAS - BSA(HAYCOCK): 2.1 M^2
BH CV ECHO MEAS - BSA: 2 M^2
BH CV ECHO MEAS - BZI_BMI: 35.1 KILOGRAMS/M^2
BH CV ECHO MEAS - BZI_METRIC_HEIGHT: 165.1 CM
BH CV ECHO MEAS - BZI_METRIC_WEIGHT: 95.7 KG
BH CV ECHO MEAS - EDV(CUBED): 71.5 ML
BH CV ECHO MEAS - EDV(MOD-SP2): 82 ML
BH CV ECHO MEAS - EDV(MOD-SP4): 96 ML
BH CV ECHO MEAS - EDV(TEICH): 76.4 ML
BH CV ECHO MEAS - EF(CUBED): 71.2 %
BH CV ECHO MEAS - EF(MOD-BP): 61 %
BH CV ECHO MEAS - EF(MOD-SP2): 58.5 %
BH CV ECHO MEAS - EF(MOD-SP4): 63.5 %
BH CV ECHO MEAS - EF(TEICH): 63.3 %
BH CV ECHO MEAS - ESV(CUBED): 20.6 ML
BH CV ECHO MEAS - ESV(MOD-SP2): 34 ML
BH CV ECHO MEAS - ESV(MOD-SP4): 35 ML
BH CV ECHO MEAS - ESV(TEICH): 28 ML
BH CV ECHO MEAS - FS: 34 %
BH CV ECHO MEAS - IVS/LVPW: 0.92
BH CV ECHO MEAS - IVSD: 0.81 CM
BH CV ECHO MEAS - LA DIMENSION: 3.9 CM
BH CV ECHO MEAS - LA/AO: 1.2
BH CV ECHO MEAS - LAD MAJOR: 5 CM
BH CV ECHO MEAS - LAT PEAK E' VEL: 8.5 CM/SEC
BH CV ECHO MEAS - LATERAL E/E' RATIO: 9.8
BH CV ECHO MEAS - LV DIASTOLIC VOL/BSA (35-75): 47.4 ML/M^2
BH CV ECHO MEAS - LV MASS(C)D: 107.1 GRAMS
BH CV ECHO MEAS - LV MASS(C)DI: 52.9 GRAMS/M^2
BH CV ECHO MEAS - LV MAX PG: 6.5 MMHG
BH CV ECHO MEAS - LV MEAN PG: 2 MMHG
BH CV ECHO MEAS - LV SYSTOLIC VOL/BSA (12-30): 17.3 ML/M^2
BH CV ECHO MEAS - LV V1 MAX: 127 CM/SEC
BH CV ECHO MEAS - LV V1 MEAN: 66.8 CM/SEC
BH CV ECHO MEAS - LV V1 VTI: 25.2 CM
BH CV ECHO MEAS - LVIDD: 4.2 CM
BH CV ECHO MEAS - LVIDS: 2.7 CM
BH CV ECHO MEAS - LVLD AP2: 6.7 CM
BH CV ECHO MEAS - LVLD AP4: 6.6 CM
BH CV ECHO MEAS - LVLS AP2: 5.2 CM
BH CV ECHO MEAS - LVLS AP4: 5.3 CM
BH CV ECHO MEAS - LVOT AREA (M): 3.1 CM^2
BH CV ECHO MEAS - LVOT AREA: 3.1 CM^2
BH CV ECHO MEAS - LVOT DIAM: 2 CM
BH CV ECHO MEAS - LVPWD: 0.88 CM
BH CV ECHO MEAS - MED PEAK E' VEL: 6.3 CM/SEC
BH CV ECHO MEAS - MEDIAL E/E' RATIO: 13.2
BH CV ECHO MEAS - MV A MAX VEL: 74 CM/SEC
BH CV ECHO MEAS - MV DEC SLOPE: 356 CM/SEC^2
BH CV ECHO MEAS - MV DEC TIME: 0.29 SEC
BH CV ECHO MEAS - MV E MAX VEL: 82.9 CM/SEC
BH CV ECHO MEAS - MV E/A: 1.1
BH CV ECHO MEAS - MV P1/2T MAX VEL: 96.6 CM/SEC
BH CV ECHO MEAS - MV P1/2T: 79.5 MSEC
BH CV ECHO MEAS - MVA P1/2T LCG: 2.3 CM^2
BH CV ECHO MEAS - MVA(P1/2T): 2.8 CM^2
BH CV ECHO MEAS - PA ACC SLOPE: 330 CM/SEC^2
BH CV ECHO MEAS - PA ACC TIME: 0.2 SEC
BH CV ECHO MEAS - PA PR(ACCEL): -12.4 MMHG
BH CV ECHO MEAS - RAP SYSTOLE: 3 MMHG
BH CV ECHO MEAS - RVSP: 20 MMHG
BH CV ECHO MEAS - SI(AO): 111.7 ML/M^2
BH CV ECHO MEAS - SI(CUBED): 25.2 ML/M^2
BH CV ECHO MEAS - SI(LVOT): 39.1 ML/M^2
BH CV ECHO MEAS - SI(MOD-SP2): 23.7 ML/M^2
BH CV ECHO MEAS - SI(MOD-SP4): 30.1 ML/M^2
BH CV ECHO MEAS - SI(TEICH): 23.9 ML/M^2
BH CV ECHO MEAS - SV(AO): 226 ML
BH CV ECHO MEAS - SV(CUBED): 50.9 ML
BH CV ECHO MEAS - SV(LVOT): 79.2 ML
BH CV ECHO MEAS - SV(MOD-SP2): 48 ML
BH CV ECHO MEAS - SV(MOD-SP4): 61 ML
BH CV ECHO MEAS - SV(TEICH): 48.4 ML
BH CV ECHO MEAS - TR MAX PG: 17 MMHG
BH CV ECHO MEAS - TR MAX VEL: 208.3 CM/SEC
BH CV ECHO MEASUREMENTS AVERAGE E/E' RATIO: 11.2
BH CV XLRA - RV BASE: 3.1 CM
BH CV XLRA - RV LENGTH: 5 CM
BH CV XLRA - RV MID: 2.7 CM
LEFT ATRIUM VOLUME INDEX: 24.2 ML/M^2
LEFT ATRIUM VOLUME: 49 ML

## 2020-12-09 PROCEDURE — 93306 TTE W/DOPPLER COMPLETE: CPT

## 2020-12-09 PROCEDURE — 93306 TTE W/DOPPLER COMPLETE: CPT | Performed by: INTERNAL MEDICINE

## 2020-12-14 ENCOUNTER — TELEPHONE (OUTPATIENT)
Dept: CARDIOLOGY | Facility: CLINIC | Age: 55
End: 2020-12-14

## 2020-12-14 ENCOUNTER — OFFICE VISIT (OUTPATIENT)
Dept: NEUROLOGY | Facility: CLINIC | Age: 55
End: 2020-12-14

## 2020-12-14 VITALS
HEIGHT: 65 IN | TEMPERATURE: 97.5 F | DIASTOLIC BLOOD PRESSURE: 78 MMHG | BODY MASS INDEX: 37.45 KG/M2 | WEIGHT: 224.8 LBS | OXYGEN SATURATION: 98 % | HEART RATE: 69 BPM | SYSTOLIC BLOOD PRESSURE: 106 MMHG

## 2020-12-14 DIAGNOSIS — G47.33 OBSTRUCTIVE SLEEP APNEA SYNDROME: Primary | ICD-10-CM

## 2020-12-14 DIAGNOSIS — G43.C0 PERIODIC HEADACHE SYNDROME, NOT INTRACTABLE: ICD-10-CM

## 2020-12-14 PROBLEM — IMO0002 STROKE SYNDROME: Status: RESOLVED | Noted: 2017-05-03 | Resolved: 2020-12-14

## 2020-12-14 PROCEDURE — 99215 OFFICE O/P EST HI 40 MIN: CPT | Performed by: PSYCHIATRY & NEUROLOGY

## 2020-12-14 RX ORDER — TOPIRAMATE 25 MG/1
TABLET ORAL
Qty: 120 TABLET | Refills: 3 | Status: SHIPPED | OUTPATIENT
Start: 2020-12-14 | End: 2021-02-08 | Stop reason: SDUPTHER

## 2020-12-14 NOTE — TELEPHONE ENCOUNTER
Joie Dejesus PA Ethington, Susan D, RN             Please call patient and let her know that the echo is normal - read by Dr. Diaz who requested she have this done.   I haven't looked in epic for follow up but with her next appt, she probably needs to see Dr. Kramer.  Can you double check on this?   Thanks.   SYED Topete      Called patient to report message above from . No answer. No voice mail. Message sent to .

## 2020-12-14 NOTE — PROGRESS NOTES
Subjective   Liliana Quevedo is a 55 y.o. female.     Chief Complaint   Patient presents with   • Post MRI       History of Present Illness     55 y.o. female seen in follow up for ABDULLAHI, presents for new problem of HA, dizziness.   Last visit on 7/2/20 renewed CPAP mask and supplies.     MRI Brain, my review of films, 11/19/2020 normal, question of abnl in L yulisa is artifact  C U/S normal   Labs 10/5/2020 TSH, CBC, CMP, Vit D NCS   Echo - normal 12/9/2020    Pt with c/o dizziness and near syncope, CP.  Reports feeling hot, clammy, dizziness lasting for 25 - 30 minutes.  Sx can occur sitting or standing.  Holter 2017 unremarkable.     Sensation of feeling, drained, blurry vision and has to sit down.  Sitting down helps.  Assoc with HA.  Located in R forehead.  Sharp and throbbing.  HA always assoc with sx.  Frequency is daily.  Lasts for an hour.  Tylenol of some benefit.  Dizziness assoc with sx.       11/2012.      Continues on CPAP 15 cm H20.    S/P Gastric sleeve 2/2017    Recent wt gain 13 lbs.     Compliant with CPAP and receiving benefit.  Improved sleep quality and daytime alertness.      Renew mask and supplies on scheduled basis.      DME:  [employer]     Allergies   Allergen Reactions   • Ampicillin Hives   • Bactrim [Sulfamethoxazole-Trimethoprim] Hives and GI Intolerance   • Sulfa Antibiotics Hives     Hot and cold flashes, N&V, diarrhea       Current Outpatient Medications on File Prior to Visit   Medication Sig Dispense Refill   • citalopram (CeleXA) 40 MG tablet TAKE ONE TABLET BY MOUTH DAILY 90 tablet 3   • diclofenac (VOLTAREN) 1 % gel gel Apply 4 g topically to the appropriate area as directed 3 (Three) Times a Day. 100 g 2   • levothyroxine (SYNTHROID, LEVOTHROID) 137 MCG tablet Take 137 mcg by mouth Daily.     • montelukast (SINGULAIR) 10 MG tablet TAKE ONE TABLET BY MOUTH ONCE NIGHTLY 90 tablet 0   • pantoprazole (PROTONIX) 40 MG EC tablet Take 1 tablet by mouth Daily. 30 tablet 3     No current  "facility-administered medications on file prior to visit.        Past Medical History:   Diagnosis Date   • Acute sinusitis     Assessed By: Geremias Ramesh (Internal Medicine); Last Assessed: 23 Jan 2015   • Allergic rhinitis    • Carpal tunnel syndrome    • Colon polyp     SIGMOID   • DDD (degenerative disc disease), lumbar    • De Quervain's tenosynovitis    • Depression    • Edema     Assessed By: Geremias Ramesh (Internal Medicine); Last Assessed: 26 Nov 2014   • Elevated alkaline phosphatase level     102   • Fatigue    • Gastric ulcer    • GERD (gastroesophageal reflux disease)     on Protonix.  Says sx's not bad even if misses meds, \"depends on what I eat\".  EGD GDW 6/16 40 cm, path DE reflux.  serum h. pyl neg   • Hematuria    • Hyperlipidemia    • Hypertension     per prim MD note   • Hypothyroidism     w/ hx thyromegaly s/p CASTILLO.    • Incomplete right bundle branch block    • Low back pain    • Mild cardiomegaly    • Morbid obesity (CMS/HCC)    • Nephrolithiasis    • Osteoarthritis    • Peripheral edema    • Polyp of sigmoid colon    • Scoliosis    • Sleep apnea     CPAP compliant   • Tendinitis    • Viral URI     Assessed By: Geremias Ramesh (Internal Medicine); Last Assessed: 23 Jan 2015   • Vitamin D deficiency    • Weight gain     Assessed By: Geremias Ramesh (Internal Medicine); Last Assessed: 26 Nov 2014       Past Surgical History:   Procedure Laterality Date   • ANKLE SURGERY Right 2000    dislocation repair w/ plate    • CARPAL TUNNEL RELEASE Right    • COLONOSCOPY  2011    screening   • CYST REMOVAL Right     right index finger   • GASTRIC SLEEVE LAPAROSCOPIC N/A 2/22/2017    Procedure: GASTRIC SLEEVE LAPAROSCOPIC;  Surgeon: Sergio Gibbons MD;  Location: [facility];  Service:    • HYSTERECTOMY Bilateral 2004    (open) w/ oophorectomy - uterine fibroids and ovarian cysts   • KNEE SURGERY Left 2015     ligament repair   • LAPAROSCOPIC CHOLECYSTECTOMY  2011    for stones   • NOSE SURGERY     • OOPHORECTOMY " "Bilateral 2004       Social History     Socioeconomic History   • Marital status:      Spouse name: Not on file   • Number of children: Not on file   • Years of education: Not on file   • Highest education level: Not on file   Tobacco Use   • Smoking status: Never Smoker   • Smokeless tobacco: Never Used   Substance and Sexual Activity   • Alcohol use: No   • Drug use: No   • Sexual activity: Defer   Social History Narrative    Lives in [place] w/ and inlaws    Working full time @[employer]        Review of Systems   Constitutional: Negative for activity change, fatigue and unexpected weight change.   HENT: Negative for tinnitus and trouble swallowing.    Eyes: Positive for visual disturbance. Negative for photophobia.   Respiratory: Negative for apnea, cough, choking and shortness of breath.    Cardiovascular: Negative for leg swelling.   Gastrointestinal: Negative for nausea and vomiting.   Endocrine: Negative for cold intolerance and heat intolerance.   Genitourinary: Negative for difficulty urinating, frequency, menstrual problem and urgency.   Musculoskeletal: Negative for back pain, gait problem, myalgias and neck pain.   Skin: Negative for color change and rash.   Allergic/Immunologic: Negative for immunocompromised state.   Neurological: Positive for dizziness, syncope, weakness and headaches. Negative for tremors, seizures, facial asymmetry, speech difficulty, light-headedness and numbness.   Hematological: Negative for adenopathy. Does not bruise/bleed easily.   Psychiatric/Behavioral: Negative for behavioral problems, confusion, decreased concentration, hallucinations and sleep disturbance.       Objective     Vitals:    12/14/20 1002   BP: 106/78   Pulse: 69   Temp: 97.5 °F (36.4 °C)   SpO2: 98%   Weight: 102 kg (224 lb 12.8 oz)   Height: 165.1 cm (65\")       Physical Exam   Constitutional: She is oriented to person, place, and time. She appears well-developed.   HENT:   Head: " Normocephalic and atraumatic.   Eyes: Pupils are equal, round, and reactive to light. EOM are normal.   Pulmonary/Chest: Effort normal.   Neurological: She is oriented to person, place, and time. She has a normal Finger-Nose-Finger Test and a normal Heel to Shin Test. Gait normal.   Skin: Skin is warm and dry.   Psychiatric: Her speech is normal and behavior is normal. Judgment and thought content normal.   Nursing note and vitals reviewed.      Neurologic Exam     Mental Status   Oriented to person, place, and time.   Speech: speech is normal   Level of consciousness: alert  Knowledge: consistent with education.   Able to name object. Normal comprehension.     Cranial Nerves     CN II   Visual fields full to confrontation.     CN III, IV, VI   Pupils are equal, round, and reactive to light.  Extraocular motions are normal.     CN V   Facial sensation intact.     CN VII   Facial expression full, symmetric.     CN VIII   CN VIII normal.     CN IX, X   CN IX normal.   CN X normal.   Palate: symmetric    CN XI   CN XI normal.     CN XII   CN XII normal.         Motor Exam   Muscle bulk: normal  Right arm pronator drift: absent  Left arm pronator drift: absent    Strength   Strength 5/5 except as noted.     Sensory Exam   Light touch normal.     Gait, Coordination, and Reflexes     Gait  Gait: normal    Coordination   Finger to nose coordination: normal  Heel to shin coordination: normal    Tremor   Resting tremor: absent  Intention tremor: absent  Action tremor: absentNormal fine finger movements       .Diagnoses and all orders for this visit:    1. Obstructive sleep apnea syndrome (Primary)  Assessment & Plan:  Continue CPAP   Compliant with CPAP receiving benefit        2. Periodic headache syndrome, not intractable  Assessment & Plan:  Headaches are newly identified.  Medication changes per orders.     Start TPM            Other orders  -     topiramate (Topamax) 25 MG tablet; Take one tablet twice a day for one  week, then two tablets twice a day thereafter  Dispense: 120 tablet; Refill: 3

## 2021-01-15 ENCOUNTER — TRANSCRIBE ORDERS (OUTPATIENT)
Dept: ADMINISTRATIVE | Facility: HOSPITAL | Age: 56
End: 2021-01-15

## 2021-01-15 DIAGNOSIS — Z78.0 MENOPAUSE: Primary | ICD-10-CM

## 2021-01-18 DIAGNOSIS — J30.9 ALLERGIC RHINITIS: ICD-10-CM

## 2021-01-19 RX ORDER — MONTELUKAST SODIUM 10 MG/1
TABLET ORAL
Qty: 90 TABLET | Refills: 2 | Status: SHIPPED | OUTPATIENT
Start: 2021-01-19 | End: 2021-10-14

## 2021-02-08 ENCOUNTER — OFFICE VISIT (OUTPATIENT)
Dept: NEUROLOGY | Facility: CLINIC | Age: 56
End: 2021-02-08

## 2021-02-08 VITALS
WEIGHT: 220 LBS | DIASTOLIC BLOOD PRESSURE: 68 MMHG | BODY MASS INDEX: 36.65 KG/M2 | HEART RATE: 62 BPM | SYSTOLIC BLOOD PRESSURE: 108 MMHG | HEIGHT: 65 IN | OXYGEN SATURATION: 99 %

## 2021-02-08 DIAGNOSIS — G43.C0 PERIODIC HEADACHE SYNDROME, NOT INTRACTABLE: ICD-10-CM

## 2021-02-08 DIAGNOSIS — G47.33 OBSTRUCTIVE SLEEP APNEA SYNDROME: Primary | Chronic | ICD-10-CM

## 2021-02-08 PROCEDURE — 99214 OFFICE O/P EST MOD 30 MIN: CPT | Performed by: PSYCHIATRY & NEUROLOGY

## 2021-02-08 RX ORDER — TOPIRAMATE 100 MG/1
100 TABLET, FILM COATED ORAL 2 TIMES DAILY
Qty: 60 TABLET | Refills: 6 | Status: SHIPPED | OUTPATIENT
Start: 2021-02-08 | End: 2021-08-09

## 2021-02-08 RX ORDER — RIZATRIPTAN BENZOATE 10 MG/1
10 TABLET ORAL ONCE AS NEEDED
Qty: 12 TABLET | Refills: 2 | Status: SHIPPED | OUTPATIENT
Start: 2021-02-08 | End: 2021-06-01

## 2021-02-08 NOTE — PROGRESS NOTES
"Chief Complaint  Sleep Apnea    Subjective          Liliana Quevedo presents to [facility] for ABDULLAHI, HA, dizziness.    History of Present Illness    55 y.o. female returns in follow up.  Last visit on 12/14/20 renewed CPAP mask and supplies, rx TPM.      Migraines    Dizziness over the weekend.  Episodes occur every three weeks.  Lasts for 3 minutes.  Intense sensation followed by a HA.    HA frequency is once a week.  Last for a few days.  Taking Tylenol.      Problem history:    MRI Brain 11/19/2020 normal, question of abnl in L yulisa is artifact  C U/S normal   Labs 10/5/2020 TSH, CBC, CMP, Vit D NCS   Echo - normal 12/9/2020    Pt with c/o dizziness and near syncope, CP.  Reports feeling hot, clammy, dizziness lasting for 25 - 30 minutes.  Sx can occur sitting or standing.  Holter 2017 unremarkable.      Sensation of feeling, drained, blurry vision and has to sit down.  Sitting down helps.  Assoc with HA.  Located in R forehead.  Sharp and throbbing.  HA always assoc with sx.  Frequency is daily.  Lasts for an hour.  Tylenol of some benefit.  Dizziness assoc with sx.       ABDULLAHI     11/2012.       Continues on CPAP 15 cm H20.     S/P Gastric sleeve 2/2017     Wt stable.      Compliant with CPAP and receiving benefit.  Improved sleep quality and daytime alertness.       Renew mask and supplies on scheduled basis.       DME:  [employer]     Objective   Vital Signs:   /68   Pulse 62   Ht 165.1 cm (65\")   Wt 99.8 kg (220 lb)   SpO2 99%   BMI 36.61 kg/m²     Physical Exam  Eyes:      Extraocular Movements: EOM normal.      Pupils: Pupils are equal, round, and reactive to light.   Neurological:      Mental Status: She is oriented to person, place, and time.      Gait: Gait is intact.      Deep Tendon Reflexes: Strength normal.   Psychiatric:         Speech: Speech normal.          Neurologic Exam     Mental Status   Oriented to person, place, and time.   Speech: speech is normal "   Level of consciousness: alert  Knowledge: good and consistent with education.   Normal comprehension.     Cranial Nerves   Cranial nerves II through XII intact.     CN II   Visual fields full to confrontation.   Visual acuity: normal  Right visual field deficit: none  Left visual field deficit: none     CN III, IV, VI   Pupils are equal, round, and reactive to light.  Extraocular motions are normal.   Nystagmus: none   Diplopia: none  Ophthalmoparesis: none  Upgaze: normal  Downgaze: normal  Conjugate gaze: present    CN V   Facial sensation intact.   Right corneal reflex: normal  Left corneal reflex: normal    CN VII   Right facial weakness: none  Left facial weakness: none    CN VIII   Hearing: intact    CN IX, X   Palate: symmetric  Right gag reflex: normal  Left gag reflex: normal    CN XI   Right sternocleidomastoid strength: normal  Left sternocleidomastoid strength: normal    CN XII   Tongue: not atrophic  Fasciculations: absent  Tongue deviation: none    Motor Exam   Muscle bulk: normal  Overall muscle tone: normal    Strength   Strength 5/5 throughout.     Sensory Exam   Light touch normal.     Gait, Coordination, and Reflexes     Gait  Gait: normal    Tremor   Resting tremor: absent  Intention tremor: absent  Action tremor: absent    Reflexes   Reflexes 2+ except as noted.      Result Review :   The following data was reviewed by: Kurt Brand MD on 02/08/2021:  Common labs    Common Labsle 10/5/20 10/5/20 10/5/20    0935 0935 0935   BUN  11    Creatinine  0.80    eGFR Non African Am  75    Sodium  140    Potassium  4.0    Chloride  102    Calcium  9.5    Albumin  4.30    Total Bilirubin  0.4    Alkaline Phosphatase  114    AST (SGOT)  19    ALT (SGPT)  13    WBC 4.34     Hemoglobin 13.6     Hematocrit 40.6     Platelets 209     Triglycerides   122   HDL Cholesterol   52   LDL Cholesterol    114 (A)   (A) Abnormal value                      Assessment and Plan    Problem List Items Addressed This  Visit        Other    Obstructive sleep apnea syndrome - Primary (Chronic)    Current Assessment & Plan     Compliant with CPAP and receiving benefit         Periodic headache syndrome, not intractable (Chronic)    Current Assessment & Plan     Headaches are improving with treatment.  Medication changes per orders.     Increase  mg BID    Add Maxalt              Relevant Medications    topiramate (TOPAMAX) 100 MG tablet    rizatriptan (Maxalt) 10 MG tablet          Follow Up   No follow-ups on file.  Patient was given instructions and counseling regarding her condition or for health maintenance advice. Please see specific information pulled into the AVS if appropriate.

## 2021-02-08 NOTE — ASSESSMENT & PLAN NOTE
Headaches are improving with treatment.  Medication changes per orders.     Increase  mg BID    Add Maxalt

## 2021-02-10 ENCOUNTER — APPOINTMENT (OUTPATIENT)
Dept: MAMMOGRAPHY | Facility: HOSPITAL | Age: 56
End: 2021-02-10

## 2021-02-17 ENCOUNTER — OFFICE VISIT (OUTPATIENT)
Dept: INTERNAL MEDICINE | Facility: CLINIC | Age: 56
End: 2021-02-17

## 2021-02-17 VITALS
HEART RATE: 55 BPM | OXYGEN SATURATION: 98 % | SYSTOLIC BLOOD PRESSURE: 122 MMHG | WEIGHT: 218.8 LBS | BODY MASS INDEX: 36.41 KG/M2 | DIASTOLIC BLOOD PRESSURE: 62 MMHG | RESPIRATION RATE: 18 BRPM | TEMPERATURE: 98.9 F

## 2021-02-17 DIAGNOSIS — J30.2 SEASONAL ALLERGIES: Primary | ICD-10-CM

## 2021-02-17 PROCEDURE — 99213 OFFICE O/P EST LOW 20 MIN: CPT | Performed by: INTERNAL MEDICINE

## 2021-02-17 RX ORDER — LEVOCETIRIZINE DIHYDROCHLORIDE 5 MG/1
5 TABLET, FILM COATED ORAL EVERY EVENING
Qty: 30 TABLET | Refills: 2 | Status: SHIPPED | OUTPATIENT
Start: 2021-02-17 | End: 2021-04-05 | Stop reason: SDUPTHER

## 2021-02-17 NOTE — PROGRESS NOTES
Subjective   Liliana Quevedo is a 55 y.o. female.     History of Present Illness     The following portions of the patient's history were reviewed and updated as appropriate: allergies, current medications, past family history, past medical history, past social history, past surgical history and problem list.    1 seasonal allergies- chronic and frequent sneezing and watery eyes.  Patient says that she has been having frequent watery eyes, sneezing and has been using her Singulair on a regular basis but this medication has not helped.  She has not tried any other over-the-counter medication     2 migraine-chronic and responded well to current medications.  Patient says this was the reason why she was having dizziness previously and was diagnosed with complex migraines.  Ever since starting on the medication she has been doing very well.    Review of Systems   All other systems reviewed and are negative.      Objective   Physical Exam  Vitals signs and nursing note reviewed.   Constitutional:       Appearance: Normal appearance. She is normal weight.   HENT:      Head: Normocephalic and atraumatic.      Right Ear: Tympanic membrane, ear canal and external ear normal.      Left Ear: Tympanic membrane, ear canal and external ear normal.      Nose: Nose normal.      Mouth/Throat:      Mouth: Mucous membranes are moist.   Eyes:      Extraocular Movements: Extraocular movements intact.      Conjunctiva/sclera: Conjunctivae normal.      Pupils: Pupils are equal, round, and reactive to light.   Neck:      Musculoskeletal: Normal range of motion and neck supple.   Cardiovascular:      Rate and Rhythm: Normal rate.      Pulses: Normal pulses.   Pulmonary:      Effort: Pulmonary effort is normal.      Breath sounds: Normal breath sounds.   Abdominal:      General: Abdomen is flat.   Skin:     General: Skin is warm.   Neurological:      General: No focal deficit present.      Mental Status: She is alert.   Psychiatric:          Mood and Affect: Mood normal.         Behavior: Behavior normal.         Thought Content: Thought content normal.         Judgment: Judgment normal.           Assessment/Plan   Diagnoses and all orders for this visit:    1. Seasonal allergies (Primary)  -     levocetirizine (Xyzal) 5 MG tablet; Take 1 tablet by mouth Every Evening.  Dispense: 30 tablet; Refill: 2    Continue with the Singulair

## 2021-04-05 ENCOUNTER — OFFICE VISIT (OUTPATIENT)
Dept: INTERNAL MEDICINE | Facility: CLINIC | Age: 56
End: 2021-04-05

## 2021-04-05 VITALS
WEIGHT: 216.4 LBS | TEMPERATURE: 97.7 F | BODY MASS INDEX: 36.01 KG/M2 | HEART RATE: 50 BPM | DIASTOLIC BLOOD PRESSURE: 72 MMHG | OXYGEN SATURATION: 95 % | SYSTOLIC BLOOD PRESSURE: 120 MMHG

## 2021-04-05 DIAGNOSIS — J30.2 SEASONAL ALLERGIES: ICD-10-CM

## 2021-04-05 DIAGNOSIS — K21.9 GASTROESOPHAGEAL REFLUX DISEASE WITHOUT ESOPHAGITIS: Primary | ICD-10-CM

## 2021-04-05 DIAGNOSIS — E03.9 HYPOTHYROIDISM, UNSPECIFIED TYPE: ICD-10-CM

## 2021-04-05 PROCEDURE — 99214 OFFICE O/P EST MOD 30 MIN: CPT | Performed by: INTERNAL MEDICINE

## 2021-04-05 RX ORDER — LEVOTHYROXINE SODIUM 137 UG/1
137 TABLET ORAL DAILY
Qty: 90 TABLET | Refills: 3 | Status: SHIPPED | OUTPATIENT
Start: 2021-04-05 | End: 2021-07-01 | Stop reason: SDUPTHER

## 2021-04-05 RX ORDER — LEVOCETIRIZINE DIHYDROCHLORIDE 5 MG/1
5 TABLET, FILM COATED ORAL EVERY EVENING
Qty: 30 TABLET | Refills: 4 | Status: SHIPPED | OUTPATIENT
Start: 2021-04-05 | End: 2021-10-11

## 2021-04-05 NOTE — PROGRESS NOTES
Subjective   Liliana Quevedo is a 55 y.o. female.     History of Present Illness     The following portions of the patient's history were reviewed and updated as appropriate: allergies, current medications, past family history, past medical history, past social history, past surgical history and problem list.    1 GERD-chronic and worsening.  Patient continues on PPI therapy and has added the famotidine (H2 blocker) despite adding these medication she continues to have intermittent episodes of breakthrough heartburn symptoms localized epigastric no fever, no chills, no nausea, no vomiting or diarrhea, no other systemic signs.    2 hypothyroid- chronic and stable.  Patient continues on the levothyroxine this medication has been doing very well with control of her symptoms.    3 seasonal allergies - chronic and mild exacerbations.  Patient is doing better and excellent response to the Xyzal.    Review of Systems   All other systems reviewed and are negative.      Objective   Physical Exam  Vitals and nursing note reviewed.   Constitutional:       Appearance: Normal appearance. She is normal weight.   HENT:      Head: Normocephalic and atraumatic.      Nose: Nose normal.      Mouth/Throat:      Mouth: Mucous membranes are moist.   Eyes:      Extraocular Movements: Extraocular movements intact.      Pupils: Pupils are equal, round, and reactive to light.   Cardiovascular:      Rate and Rhythm: Normal rate.      Pulses: Normal pulses.      Heart sounds: Normal heart sounds.   Pulmonary:      Effort: Pulmonary effort is normal.      Breath sounds: Normal breath sounds.   Abdominal:      General: Bowel sounds are normal.      Palpations: Abdomen is soft.   Musculoskeletal:         General: Normal range of motion.      Cervical back: Normal range of motion.   Skin:     General: Skin is warm.      Capillary Refill: Capillary refill takes less than 2 seconds.   Neurological:      General: No focal deficit present.      Mental  Status: She is alert.   Psychiatric:         Mood and Affect: Mood normal.         Behavior: Behavior normal.         Thought Content: Thought content normal.         Judgment: Judgment normal.           Assessment/Plan   Diagnoses and all orders for this visit:    1. Gastroesophageal reflux disease without esophagitis (Primary)  -     Ambulatory referral for Screening EGD.  Continue on with PPI and H2 blocker medication.    2. Seasonal allergies  -     levocetirizine (Xyzal) 5 MG tablet; Take 1 tablet by mouth Every Evening.  Dispense: 30 tablet; Refill: 4    3. Hypothyroidism, unspecified type  -     levothyroxine (SYNTHROID, LEVOTHROID) 137 MCG tablet; Take 1 tablet by mouth Daily.  Dispense: 90 tablet; Refill: 3

## 2021-04-09 ENCOUNTER — APPOINTMENT (OUTPATIENT)
Dept: PREADMISSION TESTING | Facility: HOSPITAL | Age: 56
End: 2021-04-09

## 2021-04-09 PROCEDURE — U0004 COV-19 TEST NON-CDC HGH THRU: HCPCS

## 2021-04-09 PROCEDURE — C9803 HOPD COVID-19 SPEC COLLECT: HCPCS

## 2021-04-10 LAB — SARS-COV-2 RNA NOSE QL NAA+PROBE: NOT DETECTED

## 2021-04-12 ENCOUNTER — OUTSIDE FACILITY SERVICE (OUTPATIENT)
Dept: GASTROENTEROLOGY | Facility: CLINIC | Age: 56
End: 2021-04-12

## 2021-04-12 PROCEDURE — 88305 TISSUE EXAM BY PATHOLOGIST: CPT | Performed by: INTERNAL MEDICINE

## 2021-04-12 PROCEDURE — 88342 IMHCHEM/IMCYTCHM 1ST ANTB: CPT | Performed by: INTERNAL MEDICINE

## 2021-04-12 PROCEDURE — 43239 EGD BIOPSY SINGLE/MULTIPLE: CPT | Performed by: INTERNAL MEDICINE

## 2021-04-13 ENCOUNTER — LAB REQUISITION (OUTPATIENT)
Dept: LAB | Facility: HOSPITAL | Age: 56
End: 2021-04-13

## 2021-04-13 DIAGNOSIS — K21.9 GASTRO-ESOPHAGEAL REFLUX DISEASE WITHOUT ESOPHAGITIS: ICD-10-CM

## 2021-04-13 DIAGNOSIS — R12 HEARTBURN: ICD-10-CM

## 2021-04-15 ENCOUNTER — HOSPITAL ENCOUNTER (OUTPATIENT)
Dept: BONE DENSITY | Facility: HOSPITAL | Age: 56
Discharge: HOME | End: 2021-04-15
Admitting: PHYSICIAN ASSISTANT

## 2021-04-15 DIAGNOSIS — Z78.0 MENOPAUSE: ICD-10-CM

## 2021-04-15 LAB
CYTO UR: NORMAL
LAB AP CASE REPORT: NORMAL
LAB AP CLINICAL INFORMATION: NORMAL
LAB AP SPECIAL STAINS: NORMAL
PATH REPORT.FINAL DX SPEC: NORMAL
PATH REPORT.GROSS SPEC: NORMAL

## 2021-04-15 PROCEDURE — 77080 DXA BONE DENSITY AXIAL: CPT

## 2021-04-20 ENCOUNTER — TELEPHONE (OUTPATIENT)
Dept: GASTROENTEROLOGY | Facility: CLINIC | Age: 56
End: 2021-04-20

## 2021-04-20 NOTE — TELEPHONE ENCOUNTER
----- Message from Figueroa Meade MD sent at 4/19/2021  5:06 PM EDT -----  Let Ms. Quevedo know there were changes of reflux on esophageal biopsies.  She should continue the proton pump medication daily.  Also send patient a handout on GERD.

## 2021-05-07 ENCOUNTER — HOSPITAL ENCOUNTER (OUTPATIENT)
Dept: MAMMOGRAPHY | Facility: HOSPITAL | Age: 56
Discharge: HOME | End: 2021-05-07
Admitting: OBSTETRICS & GYNECOLOGY

## 2021-05-07 DIAGNOSIS — Z12.31 VISIT FOR SCREENING MAMMOGRAM: ICD-10-CM

## 2021-05-07 PROCEDURE — 77067 SCR MAMMO BI INCL CAD: CPT

## 2021-05-07 PROCEDURE — 77067 SCR MAMMO BI INCL CAD: CPT | Performed by: RADIOLOGY

## 2021-05-07 PROCEDURE — 77063 BREAST TOMOSYNTHESIS BI: CPT | Performed by: RADIOLOGY

## 2021-05-07 PROCEDURE — 77063 BREAST TOMOSYNTHESIS BI: CPT

## 2021-05-28 ENCOUNTER — TELEPHONE (OUTPATIENT)
Dept: INTERNAL MEDICINE | Facility: CLINIC | Age: 56
End: 2021-05-28

## 2021-05-28 ENCOUNTER — OFFICE VISIT (OUTPATIENT)
Dept: INTERNAL MEDICINE | Facility: CLINIC | Age: 56
End: 2021-05-28

## 2021-05-28 VITALS
WEIGHT: 211 LBS | SYSTOLIC BLOOD PRESSURE: 102 MMHG | BODY MASS INDEX: 35.11 KG/M2 | HEART RATE: 56 BPM | DIASTOLIC BLOOD PRESSURE: 68 MMHG | OXYGEN SATURATION: 97 % | TEMPERATURE: 98.9 F | RESPIRATION RATE: 16 BRPM

## 2021-05-28 DIAGNOSIS — J32.9 SINUSITIS, UNSPECIFIED CHRONICITY, UNSPECIFIED LOCATION: Primary | ICD-10-CM

## 2021-05-28 LAB — SARS-COV-2 RNA NOSE QL NAA+PROBE: NOT DETECTED

## 2021-05-28 PROCEDURE — U0004 COV-19 TEST NON-CDC HGH THRU: HCPCS | Performed by: NURSE PRACTITIONER

## 2021-05-28 PROCEDURE — 99213 OFFICE O/P EST LOW 20 MIN: CPT | Performed by: NURSE PRACTITIONER

## 2021-05-28 RX ORDER — AZITHROMYCIN 250 MG/1
TABLET, FILM COATED ORAL
Qty: 6 TABLET | Refills: 0 | Status: SHIPPED | OUTPATIENT
Start: 2021-05-28 | End: 2021-08-09

## 2021-05-28 NOTE — TELEPHONE ENCOUNTER
PT calling in again stating that the Z-pack medication hasn't been fulfilled yet at her pharmacy.  Please send to:  [facility] [address] [place] [address] AT [facility] [address] - [phone]    [phone] FX

## 2021-05-28 NOTE — TELEPHONE ENCOUNTER
Liliana Quevedo [phone]  Spoke to pt, advised medication has been called in [address]. Good verbal understanding.

## 2021-05-28 NOTE — TELEPHONE ENCOUNTER
PT was seen earlier this morning.  PT called the office because her Z-pack medication hasn't been fulfilled yet at her pharmacy yet today.  She thought it was discussed during her visit.    PT uses  [facility] [address] AT [address] - [phone] [facility] [phone] FX     For additional questions or concerns PT can be reached at [phone]

## 2021-06-01 RX ORDER — RIZATRIPTAN BENZOATE 10 MG/1
TABLET ORAL
Qty: 12 TABLET | Refills: 5 | Status: SHIPPED | OUTPATIENT
Start: 2021-06-01 | End: 2022-04-03 | Stop reason: SDUPTHER

## 2021-07-01 ENCOUNTER — LAB (OUTPATIENT)
Dept: LAB | Facility: HOSPITAL | Age: 56
End: 2021-07-01

## 2021-07-01 ENCOUNTER — OFFICE VISIT (OUTPATIENT)
Dept: ENDOCRINOLOGY | Facility: CLINIC | Age: 56
End: 2021-07-01

## 2021-07-01 VITALS
SYSTOLIC BLOOD PRESSURE: 98 MMHG | BODY MASS INDEX: 34.66 KG/M2 | DIASTOLIC BLOOD PRESSURE: 68 MMHG | HEART RATE: 50 BPM | WEIGHT: 208 LBS | OXYGEN SATURATION: 100 % | HEIGHT: 65 IN

## 2021-07-01 DIAGNOSIS — E89.0 POSTABLATIVE HYPOTHYROIDISM: Primary | ICD-10-CM

## 2021-07-01 DIAGNOSIS — E89.0 POSTABLATIVE HYPOTHYROIDISM: ICD-10-CM

## 2021-07-01 DIAGNOSIS — E03.9 HYPOTHYROIDISM, UNSPECIFIED TYPE: ICD-10-CM

## 2021-07-01 LAB — TSH SERPL DL<=0.05 MIU/L-ACNC: 0.13 UIU/ML (ref 0.27–4.2)

## 2021-07-01 PROCEDURE — 99213 OFFICE O/P EST LOW 20 MIN: CPT | Performed by: INTERNAL MEDICINE

## 2021-07-01 PROCEDURE — 84443 ASSAY THYROID STIM HORMONE: CPT

## 2021-07-01 RX ORDER — LEVOTHYROXINE SODIUM 137 UG/1
137 TABLET ORAL DAILY
Qty: 90 TABLET | Refills: 3 | Status: SHIPPED | OUTPATIENT
Start: 2021-07-01 | End: 2022-07-05 | Stop reason: SDUPTHER

## 2021-07-01 NOTE — PROGRESS NOTES
"     Office Note      Date: 2021  Patient Name: Liliana Quevedo  MRN: [id number]  : 1965    Chief Complaint   Patient presents with   • Hypothyroidism       History of Present Illness:   Liliana Quevedo is a 55 y.o. female who presents for Hypothyroidism   she has hypothyroidism following I 131 therapy for hyperthyroidism  She is on 137 mcg per day. Of generic levothyroxine and is here for routine annual follow up  ----------------------------------------------  There has been no change to her medical hx.  ----------------------------------------------  tsh was normal in October  She has no questions for me       Subjective        Review of Systems:   Review of Systems   Constitutional: Negative for fatigue and unexpected weight change.   HENT: Negative for trouble swallowing and voice change.    Eyes: Negative for visual disturbance.   Endocrine: Positive for cold intolerance. Negative for heat intolerance.   Psychiatric/Behavioral: Negative for dysphoric mood and sleep disturbance. The patient is not nervous/anxious.        The following portions of the patient's history were reviewed and updated as appropriate: allergies, current medications, past family history, past medical history, past social history, past surgical history and problem list.    Objective     Visit Vitals  BP 98/68 (BP Location: Left arm, Patient Position: Sitting, Cuff Size: Adult)   Pulse 50   Ht 165.1 cm (65\")   Wt 94.3 kg (208 lb)   SpO2 100%   BMI 34.61 kg/m²       Labs:    CBC w/DIFF  Lab Results   Component Value Date    WBC 4.34 10/05/2020    RBC 4.67 10/05/2020    HGB 13.6 10/05/2020    HCT 40.6 10/05/2020    MCV 86.9 10/05/2020    MCH 29.1 10/05/2020    MCHC 33.5 10/05/2020    RDW 13.2 10/05/2020    RDWSD 41.3 10/05/2020    MPV 11.1 10/05/2020     10/05/2020    NEUTRORELPCT 54.8 2018    LYMPHORELPCT 37.3 2018    MONORELPCT 6.0 2018    EOSRELPCT 1.6 2018    BASORELPCT 0.3 2018    " AUTOIGPER 0.1 02/23/2017    NEUTROABS 2.10 08/20/2018    LYMPHSABS 1.43 08/20/2018    MONOSABS 0.23 08/20/2018    EOSABS 0.06 08/20/2018    BASOSABS 0.01 08/20/2018    AUTOIGNUM 0.01 02/23/2017       T4  Free T4   Date Value Ref Range Status   10/05/2020 1.33 0.93 - 1.70 ng/dL Final       TSH  No results found for: TSHBASE     Physical Exam:  Physical Exam  Vitals reviewed.   Constitutional:       Appearance: Normal appearance.   Eyes:      Extraocular Movements: Extraocular movements intact.   Neck:      Comments: No palpable thyroid tissue   Lymphadenopathy:      Cervical: No cervical adenopathy.   Neurological:      Mental Status: She is alert.   Psychiatric:         Mood and Affect: Mood normal.         Thought Content: Thought content normal.         Judgment: Judgment normal.         Assessment / Plan      Assessment & Plan:  Problem List Items Addressed This Visit        Other    Hypothyroidism - Primary    Overview     folloing I 131 therapy for hyperthyroidism          Current Assessment & Plan     Clinically euthyroid  Will check tsh and adjust accordingly.          Relevant Medications    levothyroxine (SYNTHROID, LEVOTHROID) 137 MCG tablet    Other Relevant Orders    TSH           Samm Ashley MD   07/01/2021

## 2021-08-09 ENCOUNTER — OFFICE VISIT (OUTPATIENT)
Dept: NEUROLOGY | Facility: CLINIC | Age: 56
End: 2021-08-09

## 2021-08-09 VITALS
DIASTOLIC BLOOD PRESSURE: 62 MMHG | OXYGEN SATURATION: 99 % | BODY MASS INDEX: 34.82 KG/M2 | HEIGHT: 65 IN | WEIGHT: 209 LBS | HEART RATE: 49 BPM | SYSTOLIC BLOOD PRESSURE: 118 MMHG

## 2021-08-09 DIAGNOSIS — G47.33 OBSTRUCTIVE SLEEP APNEA SYNDROME: Chronic | ICD-10-CM

## 2021-08-09 DIAGNOSIS — G43.C0 PERIODIC HEADACHE SYNDROME, NOT INTRACTABLE: Primary | Chronic | ICD-10-CM

## 2021-08-09 PROCEDURE — 99214 OFFICE O/P EST MOD 30 MIN: CPT | Performed by: NURSE PRACTITIONER

## 2021-08-09 RX ORDER — TOPIRAMATE 50 MG/1
50 TABLET, FILM COATED ORAL 2 TIMES DAILY
Qty: 60 TABLET | Refills: 2 | Status: SHIPPED | OUTPATIENT
Start: 2021-08-09 | End: 2021-10-04

## 2021-08-09 NOTE — ASSESSMENT & PLAN NOTE
Headaches are improving with treatment.  Medication changes per orders.     Decrease TPM to 50 mg PO BID     Start Aimovig 70 mg SC Q28D, patient V/U of how to use auto-injector, first does given in office.     Continue Maxalt

## 2021-08-09 NOTE — PROGRESS NOTES
Subjective:     Patient ID: Liliana Quevedo is a 55 y.o. female.    CC:   Chief Complaint   Patient presents with   • Obstructive sleep apnea syndrome       HPI:   History of Present Illness     55 y.o. female returns in follow up.  Last visit on 2/8/21 renewed CPAP mask and supplies, increased TPM, added Maxalt.      Migraines  TPM was increased to 100 mg PO BID. Noticing decreased concentration, losing train of thought, and word finding difficulty impacting her work.     HA frequency has decreased to once every other week. Dizziness has decreased, is less intense. Provoked by stress. Located in temples and forehead. +N, denies vomiting. + Photophobia/phonophobia, scent sensitivity. Blurry vision.      Preventatives: TPM  Abortives: Maxalt     Dizzy episodes once every 3 weeks, lasts for 3 minutes. Closes eyes and it passes.     Problem history:     MRI Brain 11/19/2020 normal, question of abnl in L yulisa is artifact  C U/S normal   Labs 10/5/2020 TSH, CBC, CMP, Vit D NCS   Echo - normal 12/9/2020    Pt with c/o dizziness and near syncope, CP.  Reports feeling hot, clammy, dizziness lasting for 25 - 30 minutes.  Sx can occur sitting or standing.  Holter 2017 unremarkable.      Sensation of feeling, drained, blurry vision and has to sit down.  Sitting down helps.  Assoc with HA.  Located in R forehead.  Sharp and throbbing.  HA always assoc with sx.  Frequency is daily.  Lasts for an hour.  Tylenol of some benefit.  Dizziness assoc with sx.       ABDULLAHI      11/2012.       Continues on CPAP 15 cm H20.     S/P Gastric sleeve 2/2017     Wt stable.      Compliant with CPAP and receiving benefit.  Improved sleep quality and daytime alertness.    Renew mask and supplies on scheduled basis.       DME:  [employer]    The following portions of the patient's history were reviewed and updated as appropriate: allergies, current medications, past family history, past medical history, past social history, past surgical history  "and problem list.    Past Medical History:   Diagnosis Date   • Acute sinusitis     Assessed By: Geremias Ramesh (Internal Medicine); Last Assessed: 23 Jan 2015   • Allergic rhinitis    • Carpal tunnel syndrome    • Colon polyp     SIGMOID   • DDD (degenerative disc disease), lumbar    • De Quervain's tenosynovitis    • Depression    • Edema     Assessed By: Geremias Ramesh (Internal Medicine); Last Assessed: 26 Nov 2014   • Elevated alkaline phosphatase level     102   • Fatigue    • Gastric ulcer    • GERD (gastroesophageal reflux disease)     on Protonix.  Says sx's not bad even if misses meds, \"depends on what I eat\".  EGD [facility] 6/16 40 cm, path DE reflux.  serum h. pyl neg   • Hematuria    • Hyperlipidemia    • Hypertension     per prim MD note   • Hypothyroidism     w/ hx thyromegaly s/p CASTILLO.    • Incomplete right bundle branch block    • Low back pain    • Mild cardiomegaly    • Morbid obesity (CMS/HCC)    • Nephrolithiasis    • Osteoarthritis    • Peripheral edema    • Polyp of sigmoid colon    • Scoliosis    • Sleep apnea     CPAP compliant   • Tendinitis    • Viral URI     Assessed By: Geremias Ramesh (Internal Medicine); Last Assessed: 23 Jan 2015   • Vitamin D deficiency    • Weight gain     Assessed By: Geremias Ramesh (Internal Medicine); Last Assessed: 26 Nov 2014       Past Surgical History:   Procedure Laterality Date   • ANKLE SURGERY Right 2000    dislocation repair w/ plate    • CARPAL TUNNEL RELEASE Right    • COLONOSCOPY  2011    screening   • CYST REMOVAL Right     right index finger   • GASTRIC SLEEVE LAPAROSCOPIC N/A 2/22/2017    Procedure: GASTRIC SLEEVE LAPAROSCOPIC;  Surgeon: Sergio Gibbons MD;  Location: [facility];  Service:    • HYSTERECTOMY Bilateral 2004    (open) w/ oophorectomy - uterine fibroids and ovarian cysts   • KNEE SURGERY Left 2015     ligament repair   • LAPAROSCOPIC CHOLECYSTECTOMY  2011    for stones   • NOSE SURGERY     • OOPHORECTOMY Bilateral 2004       Social History " "    Socioeconomic History   • Marital status:      Spouse name: Not on file   • Number of children: Not on file   • Years of education: Not on file   • Highest education level: Not on file   Tobacco Use   • Smoking status: Never Smoker   • Smokeless tobacco: Never Used   Vaping Use   • Vaping Use: Never used   Substance and Sexual Activity   • Alcohol use: No   • Drug use: No   • Sexual activity: Defer       Family History   Problem Relation Age of Onset   • Cancer Other         COLON CANCER   • Arthritis Other    • Sleep apnea Other    • Obesity Mother    • Diabetes Mother    • Hypertension Mother    • Cancer Mother    • Sleep apnea Mother    • Breast cancer Maternal Aunt         DX AGE LATE 50'S   • Obesity Father    • Obesity Sister    • Ovarian cancer Sister 47   • Obesity Brother    • Diabetes Brother    • Hypertension Brother    • Obesity Maternal Grandmother    • Diabetes Maternal Grandmother    • Hypertension Maternal Grandmother    • Heart attack Maternal Grandmother    • Sleep apnea Maternal Grandmother    • Cancer Maternal Grandmother         Objective:  /62   Pulse (!) 49   Ht 165.1 cm (65\")   Wt 94.8 kg (209 lb)   SpO2 99%   BMI 34.78 kg/m²     Neurologic Exam     Mental Status   Oriented to person, place, and time.   Follows 3 step commands.   Attention: normal. Concentration: normal.   Speech: speech is normal   Level of consciousness: alert  Knowledge: good and consistent with education.   Able to name object. Able to read. Able to repeat. Able to write. Normal comprehension.     Cranial Nerves     CN II   Visual fields full to confrontation.   Visual acuity: normal  Right visual field deficit: none  Left visual field deficit: none     CN III, IV, VI   Pupils are equal, round, and reactive to light.  Extraocular motions are normal.   Right pupil: Shape: regular. Reactivity: brisk. Consensual response: intact.   Left pupil: Shape: regular. Reactivity: brisk. Consensual response: " intact.   Nystagmus: none   Diplopia: none  Ophthalmoparesis: none  Upgaze: normal  Downgaze: normal  Conjugate gaze: present  Vestibulo-ocular reflex: present    CN V   Facial sensation intact.   Right corneal reflex: normal  Left corneal reflex: normal    CN VII   Right facial weakness: none  Left facial weakness: none    CN VIII   Hearing: intact    CN IX, X   Palate: symmetric  Right gag reflex: normal  Left gag reflex: normal    CN XI   Right sternocleidomastoid strength: normal  Left sternocleidomastoid strength: normal    CN XII   Tongue: not atrophic  Fasciculations: absent  Tongue deviation: none    Motor Exam   Muscle bulk: normal  Overall muscle tone: normal  Right arm tone: normal  Left arm tone: normal  Right leg tone: normal  Left leg tone: normal    Strength   Strength 5/5 throughout.     Sensory Exam   Light touch normal.     Gait, Coordination, and Reflexes     Gait  Gait: normal    Coordination   Finger to nose coordination: normal    Tremor   Resting tremor: absent  Intention tremor: absent  Action tremor: absent    Reflexes   Reflexes 2+ except as noted.       Physical Exam  Vitals and nursing note reviewed.   Constitutional:       Appearance: Normal appearance.   HENT:      Head: Normocephalic and atraumatic.   Eyes:      Extraocular Movements: Extraocular movements intact and EOM normal.      Pupils: Pupils are equal, round, and reactive to light.   Skin:     General: Skin is warm and dry.   Neurological:      Mental Status: She is alert and oriented to person, place, and time.      Coordination: Finger-Nose-Finger Test normal.      Gait: Gait is intact.      Deep Tendon Reflexes: Strength normal.   Psychiatric:         Mood and Affect: Mood normal.         Speech: Speech normal.         Assessment/Plan:       Diagnoses and all orders for this visit:    1. Periodic headache syndrome, not intractable (Primary)  Assessment & Plan:  Headaches are improving with treatment.  Medication changes per  orders.     Decrease TPM to 50 mg PO BID     Start Aimovig 70 mg SC Q28D, patient V/U of how to use auto-injector, first does given in office.     Continue Maxalt         Orders:  -     topiramate (Topamax) 50 MG tablet; Take 1 tablet by mouth 2 (Two) Times a Day.  Dispense: 60 tablet; Refill: 2    2. Obstructive sleep apnea syndrome  Assessment & Plan:  Stable on CPAP              Reviewed medications, potential side effects and signs and symptoms to report. Discussed risk versus benefits of treatment plan with patient and/or family-including medications, labs and radiology that may be ordered. Addressed questions and concerns during visit. Patient and/or family verbalized understanding and agree with plan. Patient instructed to call the office with questions or concerns and report to ED with life-threatening symptoms.     AS THE PROVIDER, I PERSONALLY WORE PPE DURING ENTIRE FACE TO FACE ENCOUNTER IN CLINIC WITH THE PATIENT. PATIENT ALSO WORE PPE DURING ENTIRE FACE TO FACE ENCOUNTER EXCEPT FOR A MAX OF 30 SECONDS DURING NEUROLOGICAL EVALUATION OF CRANIAL NERVES AND THEN MASK WAS PLACED BACK OVER PATIENT FACE FOR REMAINDER OF VISIT. I WASHED MY HANDS BEFORE AND AFTER VISIT.    During this visit the following were done:  Labs Reviewed []    Labs Ordered []    Radiology Reports Reviewed []    Radiology Ordered []    PCP Records Reviewed []    Referring Provider Records Reviewed []    ER Records Reviewed []    Hospital Records Reviewed []    History Obtained From Family []    Radiology Images Reviewed []    Other Reviewed []    Records Requested []      Return in about 8 weeks (around 10/4/2021).      Jennifer Rizzo, CASE  8/9/2021

## 2021-08-30 ENCOUNTER — SPECIALTY PHARMACY (OUTPATIENT)
Dept: ONCOLOGY | Facility: HOSPITAL | Age: 56
End: 2021-08-30

## 2021-08-30 RX ORDER — ERENUMAB-AOOE 70 MG/ML
70 INJECTION SUBCUTANEOUS
Qty: 1 ML | Refills: 11 | Status: SHIPPED | OUTPATIENT
Start: 2021-08-30 | End: 2021-10-29 | Stop reason: DRUGHIGH

## 2021-08-30 NOTE — PROGRESS NOTES
Injectable Neurology Medication Teaching        Patient Name/:     Liliana Quevedo    1965  Injectable Neurology Medication Regimen:  Aimovig 70mg SQ Q28 days  Date Started Medication: 2021               Initial Teaching Follow Up Comments      Safety      Storage instructions (away from children; away from heat/cold, sunlight, or moisture)       “How are you storing your medications?”, reminders on storage, proper handling (away from children, managing waste, etc.), disposal of medication with D/C or dosage change     Patient counseled on appropriate storage of medication. Store in refrigerator, away from pets and children. Advised to inspect each syringe prior to use and discard each syringe after use in an appropriate container. Pt verbalized understanding.       Adherence       patient and/or caregiver on how to take medication, take with/without food, assess their adherence potential, stress importance of adherence, ways to manage adherence (pill boxes, phone reminders, calendars), what to do if miss a dose    “How are you taking your medication?” “How are you remembering to take your medication?”, “How many doses have you missed?”, determine reasons for non-adherence (not remembering, side effects, etc), ways to improve, overadherence? Remind patient of ways to improve/maintain adherence Reviewed plan for Aimovig 70mg SQ Q28 days. Discussed importance of compliance. First dose given in office on 2021. First self-injection due 2021. Script processed at [facility] and mailed to patient.     Side Effects/Adverse Reactions       patient on potential side effects, s/s, ways to manage, when to call MD/seek help       Determine if patient experiencing side effects, ways to manage  Discussed potential side effects including but not limited to: injection site reactions, constipation, and hypersensitivity reactions.  Counseled pt on importance of  rotating injection sites. Pt verbalized understanding.      Miscellaneous      Food interactions, DDIs, financial issues Determine if patient started any new medications (analyze for DDI) No DDIs identified with planned medication list and Aimovig.       Additional Notes: Discussed aforementioned material with patient by phone. All questions and concerns addressed. Patient provided with my contact information and instructed to call if any additional questions arise. Notified [facility] [employer] of new script.

## 2021-09-22 ENCOUNTER — TELEPHONE (OUTPATIENT)
Dept: NEUROLOGY | Facility: CLINIC | Age: 56
End: 2021-09-22

## 2021-09-22 NOTE — TELEPHONE ENCOUNTER
Called [facility]-s/w Beatrice, once provider returns tomorrow will fax over a new CPAP machine order.  Fx:[phone]

## 2021-09-22 NOTE — TELEPHONE ENCOUNTER
Caller: Emy Jordynromain Thorne     Relationship: SELF    Best call back number: [phone]    Can the office call and leave a detailed voicemail regarding the call: [x]Yes []No    Do you have an active MyChart: [x]Yes []No   If yes, can the office send a response through [employer] regarding the recall: [x]Yes []No      Have you registered the machine with [facility]: [x]Yes []No    PT IS AT WORK AND DOES NOT HAVE CONFIRMATION NUMBER ON HAND.    If NO, you will need to register the machine first, once you have it registered you can give us a call back.      [facility] phone number: [phone]   [facility] website: [email or web address]    If yes, continue with questions:    How old is the current machine: 9 YEARS OLD    Who is the DME: [employer]

## 2021-09-23 NOTE — TELEPHONE ENCOUNTER
Faxed new CPAP machine order to [facility], included demographics sheet and most recent office note with Jennifer. Thanks!

## 2021-09-24 NOTE — TELEPHONE ENCOUNTER
[facility] called to let us know they received her CPAP machine order and will be able to fill this for her and that they have reached out to the patient to verify her benefits then will get her set up with a new machine. Thanks!

## 2021-09-27 ENCOUNTER — SPECIALTY PHARMACY (OUTPATIENT)
Dept: ONCOLOGY | Facility: HOSPITAL | Age: 56
End: 2021-09-27

## 2021-09-27 NOTE — PROGRESS NOTES
Injectable Neurology Medication Teaching        Patient Name/:     Liliana Quevedo    1965  Injectable Neurology Medication Regimen:  Aimovig 70mg SQ Q28 days  Date Started Medication: 2021               Initial Teaching Follow Up Comments      Safety      Storage instructions (away from children; away from heat/cold, sunlight, or moisture)       “How are you storing your medications?”, reminders on storage, proper handling (away from children, managing waste, etc.), disposal of medication with D/C or dosage change     Patient reports appropriate storage of medication.       Adherence       patient and/or caregiver on how to take medication, take with/without food, assess their adherence potential, stress importance of adherence, ways to manage adherence (pill boxes, phone reminders, calendars), what to do if miss a dose    “How are you taking your medication?” “How are you remembering to take your medication?”, “How many doses have you missed?”, determine reasons for non-adherence (not remembering, side effects, etc), ways to improve, overadherence? Remind patient of ways to improve/maintain adherence Reviewed plan for Aimovig 70mg SQ Q28 days. Discussed importance of compliance. First dose given in office on 2021. First self-injection due 10/4/2021. Script processed at [facility] and mailed to patient.     Side Effects/Adverse Reactions       patient on potential side effects, s/s, ways to manage, when to call MD/seek help       Determine if patient experiencing side effects, ways to manage  Patient denies adverse effects such as injection site reactions, and constipation. .      Miscellaneous      Food interactions, DDIs, financial issues Determine if patient started any new medications (analyze for DDI)       Additional Notes: Discussed aforementioned material with patient by phone on 21. All questions and concerns addressed. Patient provided  with my contact information and instructed to call if any additional questions arise. Notified [facility] [employer] of new script.

## 2021-10-04 ENCOUNTER — OFFICE VISIT (OUTPATIENT)
Dept: NEUROLOGY | Facility: CLINIC | Age: 56
End: 2021-10-04

## 2021-10-04 VITALS
OXYGEN SATURATION: 97 % | HEART RATE: 55 BPM | SYSTOLIC BLOOD PRESSURE: 124 MMHG | BODY MASS INDEX: 35.62 KG/M2 | TEMPERATURE: 97.7 F | DIASTOLIC BLOOD PRESSURE: 78 MMHG | WEIGHT: 213.8 LBS | HEIGHT: 65 IN

## 2021-10-04 DIAGNOSIS — G47.33 OBSTRUCTIVE SLEEP APNEA SYNDROME: Chronic | ICD-10-CM

## 2021-10-04 DIAGNOSIS — G43.C0 PERIODIC HEADACHE SYNDROME, NOT INTRACTABLE: Primary | Chronic | ICD-10-CM

## 2021-10-04 DIAGNOSIS — R42 DIZZINESS: ICD-10-CM

## 2021-10-04 DIAGNOSIS — R42 VERTIGO: ICD-10-CM

## 2021-10-04 PROCEDURE — 99214 OFFICE O/P EST MOD 30 MIN: CPT | Performed by: NURSE PRACTITIONER

## 2021-10-04 NOTE — ASSESSMENT & PLAN NOTE
Headaches are worsening.  Medication changes per orders.     Worsening with stopping TPM     Increase Aimovig to 140 mg SC Q28D     Continue Maxalt PRN

## 2021-10-04 NOTE — PROGRESS NOTES
Subjective:     Patient ID: Liliana Quevedo is a 55 y.o. female.    CC:   Chief Complaint   Patient presents with   • Headache       HPI:   History of Present Illness   55 y.o. female returns in follow up.  Last visit on 8/9/21 renewed CPAP mask and supplies, decreased TPM to 50 mg PO BID, started Aimovig 70 mg, continued Maxalt.      Migraines  Patient stopped TPM due to brain fog, with improvement in brain fog.      HA frequency has increased back to 3 days per week up to daily. Dizziness has returned and improves with closing eyes.  Moderate intensity.     HA frequency has decreased to once every other week. Dizziness has decreased, is less intense. Provoked by stress. Located in temples and forehead. +N, denies vomiting. + Photophobia/phonophobia, scent sensitivity. Blurry vision.       Preventatives: TPM, Aimovig   Abortives: Maxalt      Maxalt is abortive     Problem history:     MRI Brain 11/19/2020 normal, question of abnl in L yulisa is artifact  C U/S normal   Labs 10/5/2020 TSH, CBC, CMP, Vit D NCS   Echo - normal 12/9/2020    Pt with c/o dizziness and near syncope, CP.  Reports feeling hot, clammy, dizziness lasting for 25 - 30 minutes.  Sx can occur sitting or standing.  Holter 2017 unremarkable.      Sensation of feeling, drained, blurry vision and has to sit down.  Sitting down helps.  Assoc with HA.  Located in R forehead.  Sharp and throbbing.  HA always assoc with sx.  Frequency is daily.  Lasts for an hour.  Tylenol of some benefit.  Dizziness assoc with sx.       ABDULLAHI      11/2012.       Continues on CPAP 15 cm H20.     S/P Gastric sleeve 2/2017     Wt stable.      Compliant with CPAP and receiving benefit.  Improved sleep quality and daytime alertness.    Renew mask and supplies on scheduled basis.       DME:  [employer]    The following portions of the patient's history were reviewed and updated as appropriate: allergies, current medications, past family history, past medical history, past  "social history, past surgical history and problem list.    Past Medical History:   Diagnosis Date   • Acute sinusitis     Assessed By: Geremias Ramesh (Internal Medicine); Last Assessed: 23 Jan 2015   • Allergic rhinitis    • Carpal tunnel syndrome    • Colon polyp     SIGMOID   • DDD (degenerative disc disease), lumbar    • De Quervain's tenosynovitis    • Depression    • Edema     Assessed By: Geremias Ramesh (Internal Medicine); Last Assessed: 26 Nov 2014   • Elevated alkaline phosphatase level     102   • Fatigue    • Gastric ulcer    • GERD (gastroesophageal reflux disease)     on Protonix.  Says sx's not bad even if misses meds, \"depends on what I eat\".  EGD GDW 6/16 40 cm, path DE reflux.  serum h. pyl neg   • Hematuria    • Hyperlipidemia    • Hypertension     per prim MD note   • Hypothyroidism     w/ hx thyromegaly s/p CASTILLO.    • Incomplete right bundle branch block    • Low back pain    • Mild cardiomegaly    • Morbid obesity (HCC)    • Nephrolithiasis    • Osteoarthritis    • Peripheral edema    • Polyp of sigmoid colon    • Scoliosis    • Sleep apnea     CPAP compliant   • Tendinitis    • Viral URI     Assessed By: Geremias Ramesh (Internal Medicine); Last Assessed: 23 Jan 2015   • Vitamin D deficiency    • Weight gain     Assessed By: Geremias Ramesh (Internal Medicine); Last Assessed: 26 Nov 2014       Past Surgical History:   Procedure Laterality Date   • ANKLE SURGERY Right 2000    dislocation repair w/ plate    • CARPAL TUNNEL RELEASE Right    • COLONOSCOPY  2011    screening   • CYST REMOVAL Right     right index finger   • GASTRIC SLEEVE LAPAROSCOPIC N/A 2/22/2017    Procedure: GASTRIC SLEEVE LAPAROSCOPIC;  Surgeon: Sergio Gibbons MD;  Location: [facility];  Service:    • HYSTERECTOMY Bilateral 2004    (open) w/ oophorectomy - uterine fibroids and ovarian cysts   • KNEE SURGERY Left 2015     ligament repair   • LAPAROSCOPIC CHOLECYSTECTOMY  2011    for stones   • NOSE SURGERY     • OOPHORECTOMY Bilateral " "2004       Social History     Socioeconomic History   • Marital status:      Spouse name: Not on file   • Number of children: Not on file   • Years of education: Not on file   • Highest education level: Not on file   Tobacco Use   • Smoking status: Never Smoker   • Smokeless tobacco: Never Used   Vaping Use   • Vaping Use: Never used   Substance and Sexual Activity   • Alcohol use: No   • Drug use: No   • Sexual activity: Defer       Family History   Problem Relation Age of Onset   • Cancer Other         COLON CANCER   • Arthritis Other    • Sleep apnea Other    • Obesity Mother    • Diabetes Mother    • Hypertension Mother    • Cancer Mother    • Sleep apnea Mother    • Breast cancer Maternal Aunt         DX AGE LATE 50'S   • Obesity Father    • Obesity Sister    • Ovarian cancer Sister 47   • Obesity Brother    • Diabetes Brother    • Hypertension Brother    • Obesity Maternal Grandmother    • Diabetes Maternal Grandmother    • Hypertension Maternal Grandmother    • Heart attack Maternal Grandmother    • Sleep apnea Maternal Grandmother    • Cancer Maternal Grandmother         Objective:  /78   Pulse 55   Temp 97.7 °F (36.5 °C)   Ht 165.1 cm (65\")   Wt 97 kg (213 lb 12.8 oz)   SpO2 97%   BMI 35.58 kg/m²     Neurologic Exam     Mental Status   Oriented to person, place, and time.   Follows 3 step commands.   Attention: normal. Concentration: normal.   Speech: speech is normal   Level of consciousness: alert  Knowledge: good and consistent with education.   Able to name object. Able to read. Able to repeat. Able to write. Normal comprehension.     Cranial Nerves     CN II   Visual fields full to confrontation.   Visual acuity: normal  Right visual field deficit: none  Left visual field deficit: none     CN III, IV, VI   Pupils are equal, round, and reactive to light.  Extraocular motions are normal.   Right pupil: Shape: regular. Reactivity: brisk. Consensual response: intact.   Left pupil: Shape: " regular. Reactivity: brisk. Consensual response: intact.   Nystagmus: none   Diplopia: none  Ophthalmoparesis: none  Upgaze: normal  Downgaze: normal  Conjugate gaze: present  Vestibulo-ocular reflex: present    CN V   Facial sensation intact.   Right corneal reflex: normal  Left corneal reflex: normal    CN VII   Right facial weakness: none  Left facial weakness: none    CN VIII   Hearing: intact    CN IX, X   Palate: symmetric  Right gag reflex: normal  Left gag reflex: normal    CN XI   Right sternocleidomastoid strength: normal  Left sternocleidomastoid strength: normal    CN XII   Tongue: not atrophic  Fasciculations: absent  Tongue deviation: none    Motor Exam   Muscle bulk: normal  Overall muscle tone: normal    Strength   Strength 5/5 throughout.     Sensory Exam   Light touch normal.     Gait, Coordination, and Reflexes     Gait  Gait: normal    Coordination   Finger to nose coordination: normal    Tremor   Resting tremor: absent  Intention tremor: absent  Action tremor: absent    Reflexes   Reflexes 2+ except as noted.       Physical Exam  Vitals and nursing note reviewed.   Constitutional:       Appearance: Normal appearance.   Eyes:      Extraocular Movements: Extraocular movements intact and EOM normal.      Pupils: Pupils are equal, round, and reactive to light.   Skin:     General: Skin is warm and dry.   Neurological:      Mental Status: She is alert and oriented to person, place, and time.      Coordination: Finger-Nose-Finger Test normal.      Gait: Gait is intact.      Deep Tendon Reflexes: Strength normal.   Psychiatric:         Mood and Affect: Mood normal.         Speech: Speech normal.         Assessment/Plan:       Diagnoses and all orders for this visit:    1. Periodic headache syndrome, not intractable (Primary)  Assessment & Plan:  Headaches are worsening.  Medication changes per orders.     Worsening with stopping TPM     Increase Aimovig to 140 mg SC Q28D     Continue Maxalt PRN            2. Vertigo  -     Ambulatory Referral to Physical Therapy Evaluate and treat, Vestibular    3. Dizziness  Assessment & Plan:  Referred to Vestibular PT       4. Obstructive sleep apnea syndrome  Assessment & Plan:  Stable on CPAP              Reviewed medications, potential side effects and signs and symptoms to report. Discussed risk versus benefits of treatment plan with patient and/or family-including medications, labs and radiology that may be ordered. Addressed questions and concerns during visit. Patient and/or family verbalized understanding and agree with plan. Patient instructed to call the office with questions or concerns and report to ED with life-threatening symptoms.     AS THE PROVIDER, I PERSONALLY WORE PPE DURING ENTIRE FACE TO FACE ENCOUNTER IN CLINIC WITH THE PATIENT. PATIENT ALSO WORE PPE DURING ENTIRE FACE TO FACE ENCOUNTER EXCEPT FOR A MAX OF 30 SECONDS DURING NEUROLOGICAL EVALUATION OF CRANIAL NERVES AND THEN MASK WAS PLACED BACK OVER PATIENT FACE FOR REMAINDER OF VISIT. I WASHED MY HANDS BEFORE AND AFTER VISIT.    During this visit the following were done:  Labs Reviewed []    Labs Ordered []    Radiology Reports Reviewed []    Radiology Ordered []    PCP Records Reviewed []    Referring Provider Records Reviewed []    ER Records Reviewed []    Hospital Records Reviewed []    History Obtained From Family []    Radiology Images Reviewed []    Other Reviewed []    Records Requested []      Return in about 4 months (around 2/4/2022).      Jennifer Rizzo, CASE  10/4/2021

## 2021-10-06 ENCOUNTER — OFFICE VISIT (OUTPATIENT)
Dept: INTERNAL MEDICINE | Facility: CLINIC | Age: 56
End: 2021-10-06

## 2021-10-06 VITALS
HEART RATE: 64 BPM | OXYGEN SATURATION: 97 % | RESPIRATION RATE: 20 BRPM | TEMPERATURE: 97.5 F | DIASTOLIC BLOOD PRESSURE: 70 MMHG | WEIGHT: 213 LBS | BODY MASS INDEX: 35.49 KG/M2 | SYSTOLIC BLOOD PRESSURE: 100 MMHG | HEIGHT: 65 IN

## 2021-10-06 DIAGNOSIS — Z00.00 HEALTHCARE MAINTENANCE: ICD-10-CM

## 2021-10-06 DIAGNOSIS — F33.0 MILD EPISODE OF RECURRENT MAJOR DEPRESSIVE DISORDER (HCC): ICD-10-CM

## 2021-10-06 DIAGNOSIS — E78.00 PURE HYPERCHOLESTEROLEMIA: Primary | ICD-10-CM

## 2021-10-06 DIAGNOSIS — E55.9 VITAMIN D DEFICIENCY: ICD-10-CM

## 2021-10-06 PROCEDURE — 99214 OFFICE O/P EST MOD 30 MIN: CPT | Performed by: INTERNAL MEDICINE

## 2021-10-06 RX ORDER — CITALOPRAM 40 MG/1
40 TABLET ORAL DAILY
Qty: 90 TABLET | Refills: 3 | Status: SHIPPED | OUTPATIENT
Start: 2021-10-06 | End: 2022-09-28

## 2021-10-06 NOTE — PROGRESS NOTES
"Chief Complaint  Med Refill (6 month follow up)    Subjective    Liliana Quevedo is a 55 y.o. female.     Liliana Quevedo presents to [facility] for       History of Present Illness    The following portions of the patient's history were reviewed and updated as appropriate: allergies, current medications, past family history, past medical history, past social history, past surgical history and problem list.    1 migraine- started taking the Aimovig this medication has helped decrease her exacerbations of her migraines tremendously.  It has improved her overall quality life and her activities of daily living.    2 depression-stable and doing well.  Patient continues on current medications and has had no side effects or other issues at this time.      Review of Systems   All other systems reviewed and are negative.      Objective   Vital Signs:   /70   Pulse 64   Temp 97.5 °F (36.4 °C) (Temporal)   Resp 20   Ht 165.1 cm (65\")   Wt 96.6 kg (213 lb)   SpO2 97%   BMI 35.45 kg/m²     Body mass index is 35.45 kg/m².    Physical Exam  Vitals and nursing note reviewed.   Constitutional:       Appearance: Normal appearance. She is normal weight.   HENT:      Head: Normocephalic and atraumatic.      Right Ear: Tympanic membrane, ear canal and external ear normal.      Left Ear: Tympanic membrane, ear canal and external ear normal.      Nose: Nose normal.      Mouth/Throat:      Mouth: Mucous membranes are moist.   Eyes:      Extraocular Movements: Extraocular movements intact.      Pupils: Pupils are equal, round, and reactive to light.   Cardiovascular:      Rate and Rhythm: Normal rate and regular rhythm.      Pulses: Normal pulses.      Heart sounds: Normal heart sounds.   Pulmonary:      Effort: Pulmonary effort is normal.      Breath sounds: Normal breath sounds.   Abdominal:      General: Bowel sounds are normal.      Palpations: Abdomen is soft. "   Musculoskeletal:         General: Normal range of motion.      Cervical back: Normal range of motion and neck supple.   Skin:     General: Skin is warm.      Capillary Refill: Capillary refill takes less than 2 seconds.   Neurological:      General: No focal deficit present.      Mental Status: She is alert.   Psychiatric:         Mood and Affect: Mood normal.         Behavior: Behavior normal.         Thought Content: Thought content normal.         Judgment: Judgment normal.               Assessment and Plan  Diagnoses and all orders for this visit:          Diagnoses and all orders for this visit:    Spent 30 minutes face-to-face with patient    1. Pure hypercholesterolemia (Primary)  -     Lipid Panel; Future    2. Mild episode of recurrent major depressive disorder (HCC)  -     citalopram (CeleXA) 40 MG tablet; Take 1 tablet by mouth Daily.  Dispense: 90 tablet; Refill: 3    3. Vitamin D deficiency  -     Vitamin D 25 hydroxy; Future    4. Healthcare maintenance  -     CBC (No Diff); Future  -     Comprehensive Metabolic Panel; Future  -     T4, Free; Future  -     TSH; Future

## 2021-10-07 ENCOUNTER — TREATMENT (OUTPATIENT)
Dept: PHYSICAL THERAPY | Facility: CLINIC | Age: 56
End: 2021-10-07

## 2021-10-07 ENCOUNTER — LAB (OUTPATIENT)
Dept: LAB | Facility: HOSPITAL | Age: 56
End: 2021-10-07

## 2021-10-07 ENCOUNTER — LAB (OUTPATIENT)
Dept: INTERNAL MEDICINE | Facility: CLINIC | Age: 56
End: 2021-10-07

## 2021-10-07 DIAGNOSIS — E78.00 PURE HYPERCHOLESTEROLEMIA: ICD-10-CM

## 2021-10-07 DIAGNOSIS — Z00.00 HEALTHCARE MAINTENANCE: ICD-10-CM

## 2021-10-07 DIAGNOSIS — R42 VERTIGO: Primary | ICD-10-CM

## 2021-10-07 DIAGNOSIS — E55.9 VITAMIN D DEFICIENCY: ICD-10-CM

## 2021-10-07 LAB
25(OH)D3 SERPL-MCNC: 47.4 NG/ML (ref 30–100)
ALBUMIN SERPL-MCNC: 4.3 G/DL (ref 3.5–5.2)
ALBUMIN/GLOB SERPL: 1.5 G/DL
ALP SERPL-CCNC: 118 U/L (ref 39–117)
ALT SERPL W P-5'-P-CCNC: 14 U/L (ref 1–33)
ANION GAP SERPL CALCULATED.3IONS-SCNC: 7.5 MMOL/L (ref 5–15)
AST SERPL-CCNC: 19 U/L (ref 1–32)
BILIRUB SERPL-MCNC: 0.4 MG/DL (ref 0–1.2)
BUN SERPL-MCNC: 13 MG/DL (ref 6–20)
BUN/CREAT SERPL: 16 (ref 7–25)
CALCIUM SPEC-SCNC: 9.5 MG/DL (ref 8.6–10.5)
CHLORIDE SERPL-SCNC: 103 MMOL/L (ref 98–107)
CHOLEST SERPL-MCNC: 206 MG/DL (ref 0–200)
CO2 SERPL-SCNC: 30.5 MMOL/L (ref 22–29)
CREAT SERPL-MCNC: 0.81 MG/DL (ref 0.57–1)
DEPRECATED RDW RBC AUTO: 43 FL (ref 37–54)
ERYTHROCYTE [DISTWIDTH] IN BLOOD BY AUTOMATED COUNT: 13.4 % (ref 12.3–15.4)
GFR SERPL CREATININE-BSD FRML MDRD: 73 ML/MIN/1.73
GLOBULIN UR ELPH-MCNC: 2.9 GM/DL
GLUCOSE SERPL-MCNC: 78 MG/DL (ref 65–99)
HCT VFR BLD AUTO: 41.6 % (ref 34–46.6)
HDLC SERPL-MCNC: 53 MG/DL (ref 40–60)
HGB BLD-MCNC: 13.3 G/DL (ref 12–15.9)
LDLC SERPL CALC-MCNC: 140 MG/DL (ref 0–100)
LDLC/HDLC SERPL: 2.61 {RATIO}
MCH RBC QN AUTO: 28.1 PG (ref 26.6–33)
MCHC RBC AUTO-ENTMCNC: 32 G/DL (ref 31.5–35.7)
MCV RBC AUTO: 87.8 FL (ref 79–97)
PLATELET # BLD AUTO: 211 10*3/MM3 (ref 140–450)
PMV BLD AUTO: 10.4 FL (ref 6–12)
POTASSIUM SERPL-SCNC: 4.3 MMOL/L (ref 3.5–5.2)
PROT SERPL-MCNC: 7.2 G/DL (ref 6–8.5)
RBC # BLD AUTO: 4.74 10*6/MM3 (ref 3.77–5.28)
SODIUM SERPL-SCNC: 141 MMOL/L (ref 136–145)
T4 FREE SERPL-MCNC: 1.37 NG/DL (ref 0.93–1.7)
TRIGL SERPL-MCNC: 74 MG/DL (ref 0–150)
TSH SERPL DL<=0.05 MIU/L-ACNC: 0.97 UIU/ML (ref 0.27–4.2)
VLDLC SERPL-MCNC: 13 MG/DL (ref 5–40)
WBC # BLD AUTO: 3.36 10*3/MM3 (ref 3.4–10.8)

## 2021-10-07 PROCEDURE — 84439 ASSAY OF FREE THYROXINE: CPT

## 2021-10-07 PROCEDURE — 82306 VITAMIN D 25 HYDROXY: CPT

## 2021-10-07 PROCEDURE — 36415 COLL VENOUS BLD VENIPUNCTURE: CPT

## 2021-10-07 PROCEDURE — 85027 COMPLETE CBC AUTOMATED: CPT

## 2021-10-07 PROCEDURE — 84443 ASSAY THYROID STIM HORMONE: CPT

## 2021-10-07 PROCEDURE — 80061 LIPID PANEL: CPT

## 2021-10-07 PROCEDURE — 97161 PT EVAL LOW COMPLEX 20 MIN: CPT | Performed by: PHYSICAL THERAPIST

## 2021-10-07 PROCEDURE — 80053 COMPREHEN METABOLIC PANEL: CPT

## 2021-10-07 PROCEDURE — 97110 THERAPEUTIC EXERCISES: CPT | Performed by: PHYSICAL THERAPIST

## 2021-10-07 NOTE — PROGRESS NOTES
Physical Therapy Initial Evaluation and Plan of Care      Patient: Liliana Quevedo   : 1965  Diagnosis/ICD-10 Code:  Vertigo [R42]  Referring practitioner: SHAY Owens  Date of Initial Visit: 10/7/2021  Today's Date: 10/7/2021  Patient seen for 1 sessions      Subjective:     Subjective Evaluation    History of Present Illness  Mechanism of injury: Patient relates she has been having dizziness issues at home and work. She relates the room is spinning. There are no causitive factors. It is periodic and does not happen every day. She feels sweaty. No reports of nausea. She thinks it is likely related to migraine. Pt relates she experiences the symptoms a few times per week. It resolves after a few minutes. She rests and stops the stimulation. She does have low BP. No pain associated.       Patient Occupation:            Objective     PT Neuro   10/07/21 0900   Symptom Behavior   Type of Dizziness World moves   Frequency of Dizziness 2-3 time a week   Duration of Dizziness Minutes   Occulomotor Exam Fixation Present   Occular ROM Normal   Spontaneous Nystagmus Absent   Gaze-induced Nystagmus Absent   Smooth Pursuit Saccadic;Symptom Provoking   Saccades Undershoots   Convergence Abnormal   Positional Testing   [place] Right No nystagmus  (positive for symptoms)   [place] Left No nystagmus       Exercise 1  Exercise Name 1: visual tracking - horizontal, vertical, diagonal   Sets/Reps 1: 15 ea   Exercise 2  Exercise Name 2: targets   Sets/Reps 2: 15  Exercise 3  Exercise Name 3: saccades - horizontal and vertical   Sets/Reps 3: 15 ea   Exercise 4  Exercise Name 4: focusing while turning head   Sets/Reps 4: 15 ea    Assessment & Plan     Assessment  Assessment details: Patient is a 55YOF that presents with vertigo related symptoms x 2 years. Patient is negative for inner ear related pathology. She had abnormal occulomotor screen for visual tracking and saccades, as well  as reported associated symptoms. 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 She will be seen for vestibular habituation.   Prognosis: fair    Goals  Plan Goals: ST. Patient will report compliance with initial HEP.   2. Patient will report a 50% decrease in the amount of vertigo episodes.     LT. Patient will be compliant with final HEP.   2. Patient will report < 1 episode of vertigo per week.   3. Patient will deny any symptoms related to exercises.     Plan  Therapy options: will be seen for skilled physical therapy services  Planned therapy interventions: neuromuscular re-education, postural training, strengthening, therapeutic activities and home exercise program  Frequency: 1x week  Duration in visits: 6  Treatment plan discussed with: patient  Plan details: Patient will be seen once every other week in for vestibular habituation.         Timed:  Manual Therapy:    0     mins  45339;  Therapeutic Exercise:    10     mins  74374;     Neuromuscular Maisha:    0    mins  96439;    Therapeutic Activity:     0     mins  24701;     Gait Trainin     mins  63117;     Electrical Stimulation:    0     mins  56606 ( );    Untimed:  Canalith Repositioning    0     mins 41696    Timed Treatment:   10   mins   Total Treatment:     45   mins    PT SIGNATURE: Jennifer Plascencia, PT, DPT, MSCS, CDP  [place] License #[id number]  DATE TREATMENT INITIATED: 10/7/2021    Initial Certification Certification Period: 2022  I certify that the therapy services are furnished while this patient is under my care.  The services outlined above are required by this patient, and will be reviewed every 90 days.     PHYSICIAN: Jennifer Rizzo APRN      DATE:     Please sign and return via fax to [phone].   Thank you,   [facility].

## 2021-10-10 DIAGNOSIS — J30.2 SEASONAL ALLERGIES: ICD-10-CM

## 2021-10-11 RX ORDER — LEVOCETIRIZINE DIHYDROCHLORIDE 5 MG/1
TABLET, FILM COATED ORAL
Qty: 30 TABLET | Refills: 4 | Status: SHIPPED | OUTPATIENT
Start: 2021-10-11 | End: 2022-03-09

## 2021-10-14 DIAGNOSIS — J30.9 ALLERGIC RHINITIS: ICD-10-CM

## 2021-10-14 RX ORDER — MONTELUKAST SODIUM 10 MG/1
TABLET ORAL
Qty: 30 TABLET | Refills: 5 | Status: SHIPPED | OUTPATIENT
Start: 2021-10-14 | End: 2022-04-08

## 2021-10-29 ENCOUNTER — TREATMENT (OUTPATIENT)
Dept: PHYSICAL THERAPY | Facility: CLINIC | Age: 56
End: 2021-10-29

## 2021-10-29 ENCOUNTER — SPECIALTY PHARMACY (OUTPATIENT)
Dept: ONCOLOGY | Facility: HOSPITAL | Age: 56
End: 2021-10-29

## 2021-10-29 DIAGNOSIS — R42 VERTIGO: Primary | ICD-10-CM

## 2021-10-29 PROCEDURE — 97110 THERAPEUTIC EXERCISES: CPT | Performed by: PHYSICAL THERAPIST

## 2021-10-29 PROCEDURE — 97112 NEUROMUSCULAR REEDUCATION: CPT | Performed by: PHYSICAL THERAPIST

## 2021-10-29 RX ORDER — ERENUMAB-AOOE 140 MG/ML
140 INJECTION, SOLUTION SUBCUTANEOUS
Qty: 1 ML | Refills: 11 | Status: SHIPPED | OUTPATIENT
Start: 2021-10-29 | End: 2022-01-07 | Stop reason: CLARIF

## 2021-10-29 NOTE — PROGRESS NOTES
Injectable Neurology Medication Teaching        Patient Name/:     Liliana Quevedo    1965  Injectable Neurology Medication Regimen:  Aimovig 140mg SQ Q28 days  Date Started Medication: 140 mg dose started 21               Initial Teaching Follow Up Comments      Safety      Storage instructions (away from children; away from heat/cold, sunlight, or moisture)       “How are you storing your medications?”, reminders on storage, proper handling (away from children, managing waste, etc.), disposal of medication with D/C or dosage change     Patient reports appropriate storage of medication.       Adherence       patient and/or caregiver on how to take medication, take with/without food, assess their adherence potential, stress importance of adherence, ways to manage adherence (pill boxes, phone reminders, calendars), what to do if miss a dose    “How are you taking your medication?” “How are you remembering to take your medication?”, “How many doses have you missed?”, determine reasons for non-adherence (not remembering, side effects, etc), ways to improve, overadherence? Remind patient of ways to improve/maintain adherence Reviewed plan for Aimovig 140mg SQ Q28 days. Discussed importance of compliance. First dose given in office on 2021. Next self-injection due 2021. Script processed at [employer] and mailed to patient.     Side Effects/Adverse Reactions       patient on potential side effects, s/s, ways to manage, when to call MD/seek help       Determine if patient experiencing side effects, ways to manage  Patient denies adverse effects such as injection site reactions, and constipation. .      Miscellaneous      Food interactions, DDIs, financial issues Determine if patient started any new medications (analyze for DDI)       Additional Notes: Discussed aforementioned material with patient by phone on 10/29/2021. All questions and concerns  addressed. Patient provided with my contact information and instructed to call if any additional questions arise. Notified [facility] retail of new script.

## 2021-10-29 NOTE — PROGRESS NOTES
Physical Therapy Daily Progress Note  Visit: 2  Date of Initial Visit: Type: THERAPY  Noted: 10/7/2021    Patient: Liliana Quevedo   : 1965  Diagnosis/ICD-10 Code:  Vertigo [R42]  Referring practitioner: SHAY Owens  Date of Initial Visit: Type: THERAPY  Noted: 10/7/2021  Today's Date: 10/29/2021  Patient seen for 2 sessions      Subjective:   Patient reports: she has not been feeling well lately but thinks the exercises are getting somewhat better.   Pain: 0/10  Clinical Progress: unchanged  Home Program Compliance: Yes  Treatment has included: therapeutic exercise and neuromuscular re-education    Objective   See Exercise, Manual, and Modality Logs for complete treatment.    PT Neuro          Assessment & Plan     Assessment  Assessment details: Patient with slight improvement in symptoms from 3 weeks prior. She does have improved visual tracking with smooth pursuit movements. Patient was given a few cervical spine stretches along with exercises that she will continue.     Plan  Plan details: Follow up in 2-3 weeks.         Timed:  Manual Therapy:            0     mins  29532;  Therapeutic Exercise:    8    mins  17103;     Neuromuscular Maisha:    20    mins  71943;    Therapeutic Activity:      0     mins  26354;     Gait Trainin    mins  55986;     Electrical Stimulation:    0    mins  83775 ( );     Untimed:  Canalith Repositioning techniques _[id number]_ [id number]      Timed Treatment:   28   mins   Total Treatment:     28   mins      Jennifer Plasecncia PT, DPT, MSCS, CDP  [place] License #: [id number]  Physical Therapist

## 2021-11-17 ENCOUNTER — TREATMENT (OUTPATIENT)
Dept: PHYSICAL THERAPY | Facility: CLINIC | Age: 56
End: 2021-11-17

## 2021-11-17 DIAGNOSIS — R42 VERTIGO: Primary | ICD-10-CM

## 2021-11-17 PROCEDURE — 97112 NEUROMUSCULAR REEDUCATION: CPT | Performed by: PHYSICAL THERAPIST

## 2021-11-17 NOTE — PROGRESS NOTES
Physical Therapy Daily Note/Discharge Summary  Visit: 3  Date of Initial Visit: Type: THERAPY  Noted: 10/7/2021    Patient: Liliana Quevedo   : 1965  Diagnosis/ICD-10 Code:  Vertigo [R42]  Referring practitioner: No ref. provider found  Date of Initial Visit: Type: THERAPY  Noted: 10/7/2021  Today's Date: 2021  Patient seen for 3 sessions      Subjective:   Patient reports: she has been feeling better.   Pain: 0/10  Clinical Progress: improved  Home Program Compliance: Yes  Treatment has included: neuromuscular re-education    Objective   See Exercise, Manual, and Modality Logs for complete treatment.    PT Neuro          Assessment & Plan     Assessment  Assessment details: Patient reporting improved symptoms with only 1 episode of vertigo within the last 2 weeks. Her HEP was updated. She feels she is ready to D/C at this time.     Plan  Plan details: Patient to continue with PT services to improve gait, balance, strength, transfers and overall functional mobility.          Timed:  Manual Therapy:            0     mins  93284;  Therapeutic Exercise:    0    mins  74646;     Neuromuscular Maisha:    15    mins  31485;    Therapeutic Activity:      0     mins  95249;     Gait Trainin    mins  60649;     Electrical Stimulation:    0    mins  99667 ( );     Untimed:  Canalith Repositioning techniques _0_ [id number]      Timed Treatment:   15   mins   Total Treatment:     15   mins      Jennifer Plascencia, PT, DPT, MSCS, CDP  [place] License #: [id number]  Physical Therapist

## 2021-11-22 ENCOUNTER — SPECIALTY PHARMACY (OUTPATIENT)
Dept: ONCOLOGY | Facility: HOSPITAL | Age: 56
End: 2021-11-22

## 2021-11-23 NOTE — PROGRESS NOTES
Specialty Pharmacy Refill Coordination Note     Liliana is a 56 y.o. female contacted today regarding refills of  Aimovig specialty medication(s).    Reviewed and verified with patient: Yes  Specialty medication(s) and dose(s) confirmed: yes    Refill Questions      Most Recent Value   Changes to allergies? No   Changes to medications? No   New conditions since last clinic visit No   Unplanned office visit, urgent care, ED, or hospital admission in the last 4 weeks  No   How does patient/caregiver feel medication is working? Excellent   Financial problems or insurance changes  No   How many doses of your specialty medications were missed in the last 4 weeks? 0          Delivery Questions      Most Recent Value   Delivery method [employer]   Delivery address correct? Yes   Preferred delivery time? Anytime   Number of medications in delivery 1   Medication being filled and delivered Aimovig   Doses left of specialty medications 0   Is there any medication that is due not being filled? No   Supplies needed? No supplies needed   Cooler needed? Yes   Do any medications need mixed or dated? No   Copay form of payment Credit card on file   Additional comments Scheduled fill for 11/29   Questions or concerns for the pharmacist? No   Are any medications first time fills? No            Medication Adherence    Any gaps in refill history greater than 2 weeks in the last 3 months: no  Demonstrates understanding of importance of adherence: yes  Informant: patient  Reliability of informant: reliable  Provider-estimated medication adherence level: %  Reasons for non-adherence: no problems identified  Adherence tools used: calendar  Support network for adherence: healthcare provider   Other support network: Pharmacy           Follow-up: Plan to reach out to patient in 28 days from dispense     Michelle Barbosa, Pharmacy Technician  Specialty Pharmacy Technician

## 2021-12-22 ENCOUNTER — SPECIALTY PHARMACY (OUTPATIENT)
Dept: ONCOLOGY | Facility: HOSPITAL | Age: 56
End: 2021-12-22

## 2021-12-22 NOTE — PROGRESS NOTES
Specialty Pharmacy Refill Coordination Note     Liliana is a 56 y.o. female contacted today regarding refills of  Aimovig specialty medication(s). Patient is due for injection on 1/6.    Reviewed and verified with patient: Yes  Specialty medication(s) and dose(s) confirmed: yes    Refill Questions      Most Recent Value   Changes to allergies? No   Changes to medications? No   New conditions since last clinic visit No   Unplanned office visit, urgent care, ED, or hospital admission in the last 4 weeks  No   How does patient/caregiver feel medication is working? Excellent   Financial problems or insurance changes  No   How many doses of your specialty medications were missed in the last 4 weeks? 0          Delivery Questions      Most Recent Value   Delivery method FedEx   Delivery address correct? Yes   Preferred delivery time? Anytime   Number of medications in delivery 1   Medication being filled and delivered Aimovig   Doses left of specialty medications 0   Is there any medication that is due not being filled? No   Supplies needed? No supplies needed   Cooler needed? Yes   Do any medications need mixed or dated? No   Copay form of payment Payment plan already set up   Questions or concerns for the pharmacist? No   Are any medications first time fills? No            Medication Adherence    Adherence tools used: calendar  Support network for adherence: healthcare provider   Other support network: Pharmacy           Follow-up: Plan to reach out to patient in 28 days from dispense for a refill.     Michelle Barbosa, Pharmacy Technician  Specialty Pharmacy Technician

## 2022-01-06 ENCOUNTER — TELEPHONE (OUTPATIENT)
Dept: NEUROLOGY | Facility: CLINIC | Age: 57
End: 2022-01-06

## 2022-01-06 NOTE — TELEPHONE ENCOUNTER
Caller: EVELINE WHIPPLE    Relationship: SELF    What medications are you currently taking: Erenumab-aooe (Aimovig) 140 MG/ML prefilled syringe  Current Outpatient Medications on File Prior to Visit   Medication Sig Dispense Refill   • citalopram (CeleXA) 40 MG tablet Take 1 tablet by mouth Daily. 90 tablet 3   • Erenumab-aooe (Aimovig) 140 MG/ML prefilled syringe Inject 1 mL under the skin into the appropriate area as directed Every 28 (Twenty-Eight) Days. 1 mL 11   • levocetirizine (XYZAL) 5 MG tablet TAKE ONE TABLET BY MOUTH EVERY EVENING 30 tablet 4   • levothyroxine (SYNTHROID, LEVOTHROID) 137 MCG tablet Take 1 tablet by mouth Daily. 90 tablet 3   • montelukast (SINGULAIR) 10 MG tablet TAKE ONE TABLET BY MOUTH ONCE NIGHTLY 30 tablet 5   • rizatriptan (MAXALT) 10 MG tablet TAKE ONE TABLET BY MOUTH AT ONSET OF HEADACHE; MAY REPEAT ONE TABLET IN 2 HOURS IF NEEDED. 12 tablet 5     No current facility-administered medications on file prior to visit.        Which medication are you concerned about: Erenumab-aooe (Aimovig) 140 MG/ML prefilled syringe    Who prescribed you this medication:DR. ROBERTS    What are your concerns: PT HAS NO RECEIVED HER Erenumab-aooe (Aimovig) 140 MG/ML prefilled syringe THAT COMES IN THE MAIL FROM [facility] ONCE A MONTH AND IT IS DUE TODAY.        PLEASE CALL HER BACK [phone]

## 2022-01-07 ENCOUNTER — TELEPHONE (OUTPATIENT)
Dept: NEUROLOGY | Facility: CLINIC | Age: 57
End: 2022-01-07

## 2022-01-07 ENCOUNTER — SPECIALTY PHARMACY (OUTPATIENT)
Dept: ONCOLOGY | Facility: HOSPITAL | Age: 57
End: 2022-01-07

## 2022-01-07 RX ORDER — FAMOTIDINE 20 MG/1
20 TABLET, FILM COATED ORAL DAILY
COMMUNITY
End: 2022-09-08 | Stop reason: SDUPTHER

## 2022-01-07 NOTE — PROGRESS NOTES
"   Specialty Pharmacy Patient Management Program  Neurology Initial Assessment       Liliana Quevedo is a 56 y.o. female with migraines seen by a Neurology provider and enrolled in the Neurology Patient Management program offered by [facility].  An initial outreach was conducted, including assessment of therapy appropriateness and specialty medication education for Emgality. The patient was introduced to services offered by [facility], including: regular assessments, refill coordination, curbside pick-up or mail order delivery options, prior authorization maintenance, and financial assistance programs as applicable. The patient was also provided with contact information for the pharmacy team.     Insurance Coverage & Financial Support  [employer], Emgality Co-pay card     Relevant Past Medical History and Comorbidities  Relevant medical history and concomitant health conditions were discussed with the patient. The patient's chart has been reviewed for relevant past medical history and comorbid health conditions and updated as necessary.   Past Medical History:   Diagnosis Date   • Acute sinusitis     Assessed By: Geremias Ramesh (Internal Medicine); Last Assessed: 23 Jan 2015   • Allergic rhinitis    • Carpal tunnel syndrome    • Colon polyp     SIGMOID   • DDD (degenerative disc disease), lumbar    • De Quervain's tenosynovitis    • Depression    • Edema     Assessed By: Geremias Ramesh (Internal Medicine); Last Assessed: 26 Nov 2014   • Elevated alkaline phosphatase level     102   • Fatigue    • Gastric ulcer    • GERD (gastroesophageal reflux disease)     on Protonix.  Says sx's not bad even if misses meds, \"depends on what I eat\".  EGD GDW 6/16 40 cm, path DE reflux.  serum h. pyl neg   • Hematuria    • Hyperlipidemia    • Hypertension     per prim MD note   • Hypothyroidism     w/ hx thyromegaly s/p CASTILLO.    • Incomplete right bundle branch block    • Low back pain  "   • Mild cardiomegaly    • Morbid obesity (HCC)    • Nephrolithiasis    • Osteoarthritis    • Peripheral edema    • Polyp of sigmoid colon    • Scoliosis    • Sleep apnea     CPAP compliant   • Tendinitis    • Viral URI     Assessed By: Geremias Ramesh (Internal Medicine); Last Assessed: 23 Jan 2015   • Vitamin D deficiency    • Weight gain     Assessed By: Geremias Ramesh (Internal Medicine); Last Assessed: 26 Nov 2014     Social History     Socioeconomic History   • Marital status:    Tobacco Use   • Smoking status: Never Smoker   • Smokeless tobacco: Never Used   Vaping Use   • Vaping Use: Never used   Substance and Sexual Activity   • Alcohol use: No   • Drug use: No   • Sexual activity: Defer       Allergies  Known allergies and reactions were discussed with the patient. The patient's chart has been reviewed for  allergy information and updated as necessary.   Ampicillin, Bactrim [sulfamethoxazole-trimethoprim], and Sulfa antibiotics    Current Medication List  This medication list has been reviewed with the patient and evaluated for any interactions or necessary modifications/recommendations, and updated to include all prescription medications, OTC medications, and supplements the patient is currently taking.  This list reflects what is contained in the patient's profile, which has also been marked as reviewed to communicate to other providers it is the most up to date version of the patient's current medication therapy.     Current Outpatient Medications:   •  citalopram (CeleXA) 40 MG tablet, Take 1 tablet by mouth Daily., Disp: 90 tablet, Rfl: 3  •  famotidine (PEPCID) 20 MG tablet, Take 20 mg by mouth Daily., Disp: , Rfl:   •  galcanezumab-gnlm (EMGALITY) 120 MG/ML auto-injector pen, Inject 1 mL under the skin into the appropriate area as directed Every 28 (Twenty-Eight) Days., Disp: 1 mL, Rfl: 11  •  levocetirizine (XYZAL) 5 MG tablet, TAKE ONE TABLET BY MOUTH EVERY EVENING, Disp: 30 tablet, Rfl: 4  •   levothyroxine (SYNTHROID, LEVOTHROID) 137 MCG tablet, Take 1 tablet by mouth Daily., Disp: 90 tablet, Rfl: 3  •  montelukast (SINGULAIR) 10 MG tablet, TAKE ONE TABLET BY MOUTH ONCE NIGHTLY, Disp: 30 tablet, Rfl: 5  •  rizatriptan (MAXALT) 10 MG tablet, TAKE ONE TABLET BY MOUTH AT ONSET OF HEADACHE; MAY REPEAT ONE TABLET IN 2 HOURS IF NEEDED., Disp: 12 tablet, Rfl: 5    Drug Interactions  none     Recommended Medications Assessment: none      Relevant Laboratory Values    Labs monitoring not required    Initial Education Provided for Specialty Medication  The patient has been provided with the following education and any applicable administration techniques (i.e. self-injection) have been demonstrated for the therapies indicated. All questions and concerns have been addressed prior to the patient receiving the medication, and the patient has verbalized understanding of the education and any materials provided.  Additional patient education shall be provided and documented upon request by the patient, provider or payer.                   Emgality (Galcanezumab-gnlm) 120 mg SubQ every 28 days           Medication Expectations   Why am I taking this medication? You are taking this medication for migraine prophylaxis.   What should I expect while on this medication? You should expect to a decrease in the frequency and severity of your migraines.   How does the medication work? Emgality is a monoclonal antibody that binds to calcitonin gene-related peptide (CGRP) and blocks its binding to the receptor decreasing the severity of migraines.   How long will I be on this medication for? The amount of time you will be on this medication will be determined by your doctor and your response to the medication.    How do I take this medication? Take as directed on your prescription label. This medication is a self-injection given every 28 days.    What are some possible side effects? Injection site reactions and hypersensitivity  reactions.   What happens if I miss a dose? If you miss a dose, take it as soon as you remember, and time next dose 28 days from last dose.                  Medication Safety   What are things I should warn my doctor immediately about? Hypersensitivity reactions.   What are things that I should be cautious of? Injection site reaction.   What are some medications that can interact with this one? No drug interactions identified.            Medication Storage/Handling   How should I handle this medication? Keep this medication our of reach of pets/children in original container.  On the day your Emgality is due let it set at room temperature for 30 minutes prior to injection. (do NOT warm using a heat source such as hot water or a microwave).  Administer in the abdomen, thigh, back of the upper arm, or buttocks.  Do not inject where the skin is tender, bruised, red or hard.  Rotate injection sites.   How does this medication need to be stored? Store in refrigerator and keep dry.   How should I dispose of this medication? You can dispose of the empty syringe in a sharps container, and if this is not available you may use an empty hard plastic container such as a milk jug or tide container.            Resources/Support   How can I remind myself to take this medication? You can download a reminder mya on your phone or use a calandar  to help with your monthly injection.   Is financial support available?  Yes, [employer] can provide co-pay cards if you have commercial insurance or patient assistance if you have Medicare or no insurance.    Which vaccines are recommended for me? Talk to your doctor about these vaccines: Flu, Coronavirus (COVID-19), Pneumococcal (pneumonia), Tdap, Hepatitis B, Zoster (shingles)                  Adherence and Self-Administration  • Barriers to Patient Adherence and/or Self-Administration: none   • Methods for Supporting Patient Adherence and/or Self-Administration: Self-injection demonstrated in  office (with Aimovig), Patient proficient with self-injectins     Goals of Therapy  • Patient Goals of Therapy: symptom management, patient goal of better diet and increasing physical activity.  • Clinical Goals or Therapeutic Targets, If Applicable: Decrease in severity and frequency of migraines       Quality of Life Assessment   The patient's current (pre-therapy) quality of life was discussed with the patient. The QOL segment of this outreach has been reviewed and updated.   • Quality of Life Score: 5 (new start Emgaltiy)    Reassessment Plan & Follow-Up  1. Pharmacist to perform regular reassessments no more than (6) months from the previous assessment.  2. Welcome information and patient satisfaction survey to be sent by retail team with patient's initial fill.  3. Care Coordinator to set up future refill outreaches, coordinate prescription delivery, and escalate clinical questions to pharmacist.     Additional Plans, Therapy Recommendations or Therapy Problems to Be Addressed: none   Recent Provider Changes:  D/C Aimovig (insurance formulary change) and Start Emgality     Attestation  I attest that the initiated specialty medication(s) is appropriate for the patient based on my assessment.  If the prescribed therapy is at any point deemed not appropriate based on the current or future assessments, a consultation will be initiated with the patient's specialty care provider to determine the best course of action. The revised plan of therapy will be documented along with any additional patient education provided.     Susan Turcios, PharmD  1/7/2022  14:07 EST

## 2022-01-07 NOTE — TELEPHONE ENCOUNTER
[facility] called. Prior Auth for Aimovig was denied as they are wanting patient to try the alternatives: Emgality or Ajovy

## 2022-01-07 NOTE — TELEPHONE ENCOUNTER
Rayne spoke with her about picking up an Aimovig sample next week but may just give her a call now and switch to Emgality looks like deductible reset so will get her a copay card on this.    He is calling her now. THanks aRyne!

## 2022-02-01 ENCOUNTER — SPECIALTY PHARMACY (OUTPATIENT)
Dept: ENDOCRINOLOGY | Facility: CLINIC | Age: 57
End: 2022-02-01

## 2022-02-01 NOTE — PROGRESS NOTES
Specialty Pharmacy Refill Coordination Note     Liliana is a 56 y.o. female contacted today regarding refills of  Emgality specialty medication(s). Patient is due for injection on 2/3.    Reviewed and verified with patient: Yes  Specialty medication(s) and dose(s) confirmed: yes    Refill Questions      Most Recent Value   Changes to allergies? No   Changes to medications? No   New conditions since last clinic visit No   Unplanned office visit, urgent care, ED, or hospital admission in the last 4 weeks  No   How does patient/caregiver feel medication is working? Excellent   Financial problems or insurance changes  No   If yes, describe changes in insurance or financial issues. N/A   How many doses of your specialty medications were missed in the last 4 weeks? 0   Why were doses missed? N/A   Does this patient require a clinical escalation to a pharmacist? No          Delivery Questions      Most Recent Value   Delivery method FedEx   Delivery address correct? Yes   Preferred delivery time? Anytime   Number of medications in delivery 1   Medication being filled and delivered Emgality   Doses left of specialty medications 0   Is there any medication that is due not being filled? No   Supplies needed? No supplies needed   Cooler needed? Yes   Do any medications need mixed or dated? No   Copay form of payment Payment plan already set up   Questions or concerns for the pharmacist? No   Are any medications first time fills? No            Medication Adherence    Adherence tools used: calendar  Support network for adherence: healthcare provider   Other support network: Pharmacy           Follow-up: Plan to reach out to patient in 28 days for a refill.     Michelle Barbosa, Pharmacy Technician  Specialty Pharmacy Technician

## 2022-02-15 ENCOUNTER — OFFICE VISIT (OUTPATIENT)
Dept: NEUROLOGY | Facility: CLINIC | Age: 57
End: 2022-02-15

## 2022-02-15 VITALS
HEART RATE: 54 BPM | BODY MASS INDEX: 37.49 KG/M2 | DIASTOLIC BLOOD PRESSURE: 76 MMHG | HEIGHT: 65 IN | TEMPERATURE: 97.7 F | WEIGHT: 225 LBS | OXYGEN SATURATION: 98 % | SYSTOLIC BLOOD PRESSURE: 112 MMHG

## 2022-02-15 DIAGNOSIS — G47.33 OBSTRUCTIVE SLEEP APNEA SYNDROME: Chronic | ICD-10-CM

## 2022-02-15 DIAGNOSIS — G43.C0 PERIODIC HEADACHE SYNDROME, NOT INTRACTABLE: Primary | Chronic | ICD-10-CM

## 2022-02-15 PROCEDURE — 99214 OFFICE O/P EST MOD 30 MIN: CPT | Performed by: NURSE PRACTITIONER

## 2022-02-15 RX ORDER — IBANDRONATE SODIUM 150 MG/1
TABLET, FILM COATED ORAL
COMMUNITY

## 2022-02-15 NOTE — ASSESSMENT & PLAN NOTE
Headaches are improving with treatment.  Continue current treatment regimen.     Stable on Emgality and Maxalt

## 2022-02-15 NOTE — PROGRESS NOTES
Subjective:     Patient ID: Liliana Quevedo is a 56 y.o. female.    CC:   Chief Complaint   Patient presents with   • Headache       HPI:   History of Present Illness   56 y.o. female returns in follow up.  Last visit on 10/4/21 renewed CPAP mask and supplies, stopped TPM, increased Aimovig 140 mg, continued Maxalt. referred to vestibular PT.      Migraines  Had to switch to Emgality due to insurance.     HA frequency has decreased to twice per month.   Dizziness has improved. Provoked by stress. Located in temples and forehead. +N, denies vomiting. + Photophobia/phonophobia, scent sensitivity. Blurry vision.       Preventatives: TPM (brain fog), Aimovig   Abortives: Maxalt      Maxalt is abortive within 30 minutes      Problem history:     MRI Brain 11/19/2020 normal, question of abnl in L yulisa is artifact  C U/S normal   Labs 10/5/2020 TSH, CBC, CMP, Vit D NCS   Echo - normal 12/9/2020    Pt with c/o dizziness and near syncope, CP.  Reports feeling hot, clammy, dizziness lasting for 25 - 30 minutes.  Sx can occur sitting or standing.  Holter 2017 unremarkable.      Sensation of feeling, drained, blurry vision and has to sit down.  Sitting down helps.  Assoc with HA.  Located in R forehead.  Sharp and throbbing.  HA always assoc with sx.  Frequency is daily.  Lasts for an hour.  Tylenol of some benefit.  Dizziness assoc with sx.       ABDULLAHI      11/2012.       Continues on CPAP 15 cm H20.     S/P Gastric sleeve 2/2017     Wt stable.      Compliant with CPAP and receiving benefit.  Improved sleep quality and daytime alertness.    Renew mask and supplies on scheduled basis.       DME:  [employer]    The following portions of the patient's history were reviewed and updated as appropriate: allergies, current medications, past family history, past medical history, past social history, past surgical history and problem list.    Past Medical History:   Diagnosis Date   • Acute sinusitis     Assessed By: Geremias Ramesh  "(Internal Medicine); Last Assessed: 23 Jan 2015   • Allergic rhinitis    • Carpal tunnel syndrome    • Colon polyp     SIGMOID   • DDD (degenerative disc disease), lumbar    • De Quervain's tenosynovitis    • Depression    • Edema     Assessed By: Geremias Ramesh (Internal Medicine); Last Assessed: 26 Nov 2014   • Elevated alkaline phosphatase level     102   • Fatigue    • Gastric ulcer    • GERD (gastroesophageal reflux disease)     on Protonix.  Says sx's not bad even if misses meds, \"depends on what I eat\".  EGD GDW 6/16 40 cm, path DE reflux.  serum h. pyl neg   • Hematuria    • Hyperlipidemia    • Hypertension     per prim MD note   • Hypothyroidism     w/ hx thyromegaly s/p CASTILLO.    • Incomplete right bundle branch block    • Low back pain    • Mild cardiomegaly    • Morbid obesity (HCC)    • Nephrolithiasis    • Osteoarthritis    • Peripheral edema    • Polyp of sigmoid colon    • Scoliosis    • Sleep apnea     CPAP compliant   • Tendinitis    • Viral URI     Assessed By: Geremias Ramesh (Internal Medicine); Last Assessed: 23 Jan 2015   • Vitamin D deficiency    • Weight gain     Assessed By: Geremias Ramesh (Internal Medicine); Last Assessed: 26 Nov 2014       Past Surgical History:   Procedure Laterality Date   • ANKLE SURGERY Right 2000    dislocation repair w/ plate    • CARPAL TUNNEL RELEASE Right    • COLONOSCOPY  2011    screening   • CYST REMOVAL Right     right index finger   • GASTRIC SLEEVE LAPAROSCOPIC N/A 2/22/2017    Procedure: GASTRIC SLEEVE LAPAROSCOPIC;  Surgeon: Sergio Gibbons MD;  Location: [facility];  Service:    • HYSTERECTOMY Bilateral 2004    (open) w/ oophorectomy - uterine fibroids and ovarian cysts   • KNEE SURGERY Left 2015     ligament repair   • LAPAROSCOPIC CHOLECYSTECTOMY  2011    for stones   • NOSE SURGERY     • OOPHORECTOMY Bilateral 2004       Social History     Socioeconomic History   • Marital status:    Tobacco Use   • Smoking status: Never Smoker   • Smokeless " "tobacco: Never Used   Vaping Use   • Vaping Use: Never used   Substance and Sexual Activity   • Alcohol use: No   • Drug use: No   • Sexual activity: Defer       Family History   Problem Relation Age of Onset   • Cancer Other         COLON CANCER   • Arthritis Other    • Sleep apnea Other    • Obesity Mother    • Diabetes Mother    • Hypertension Mother    • Cancer Mother    • Sleep apnea Mother    • Breast cancer Maternal Aunt         DX AGE LATE 50'S   • Obesity Father    • Obesity Sister    • Ovarian cancer Sister 47   • Obesity Brother    • Diabetes Brother    • Hypertension Brother    • Obesity Maternal Grandmother    • Diabetes Maternal Grandmother    • Hypertension Maternal Grandmother    • Heart attack Maternal Grandmother    • Sleep apnea Maternal Grandmother    • Cancer Maternal Grandmother         Objective:  /76   Pulse 54   Temp 97.7 °F (36.5 °C)   Ht 165.1 cm (65\")   Wt 102 kg (225 lb)   SpO2 98%   BMI 37.44 kg/m²     Neurologic Exam     Mental Status   Oriented to person, place, and time.   Follows 3 step commands.   Attention: normal. Concentration: normal.   Speech: speech is normal   Level of consciousness: alert  Knowledge: good and consistent with education.   Able to name object. Able to read. Able to repeat. Able to write. Normal comprehension.     Cranial Nerves     CN II   Visual fields full to confrontation.   Visual acuity: normal  Right visual field deficit: none  Left visual field deficit: none     CN III, IV, VI   Pupils are equal, round, and reactive to light.  Extraocular motions are normal.   Right pupil: Shape: regular. Reactivity: brisk. Consensual response: intact.   Left pupil: Shape: regular. Reactivity: brisk. Consensual response: intact.   Nystagmus: none   Diplopia: none  Ophthalmoparesis: none  Upgaze: normal  Downgaze: normal  Conjugate gaze: present  Vestibulo-ocular reflex: present    CN V   Facial sensation intact.   Right corneal reflex: normal  Left corneal " reflex: normal    CN VII   Right facial weakness: none  Left facial weakness: none    CN VIII   Hearing: intact    CN IX, X   Palate: symmetric  Right gag reflex: normal  Left gag reflex: normal    CN XI   Right sternocleidomastoid strength: normal  Left sternocleidomastoid strength: normal    CN XII   Tongue: not atrophic  Fasciculations: absent  Tongue deviation: none    Motor Exam   Muscle bulk: normal  Overall muscle tone: normal    Strength   Strength 5/5 throughout.     Sensory Exam   Light touch normal.     Gait, Coordination, and Reflexes     Gait  Gait: normal    Tremor   Resting tremor: absent  Intention tremor: absent  Action tremor: absent    Reflexes   Reflexes 2+ except as noted.       Physical Exam  Vitals and nursing note reviewed.   Constitutional:       Appearance: Normal appearance.   Eyes:      Extraocular Movements: EOM normal.      Pupils: Pupils are equal, round, and reactive to light.   Skin:     General: Skin is warm and dry.   Neurological:      Mental Status: She is alert and oriented to person, place, and time.      Gait: Gait is intact.      Deep Tendon Reflexes: Strength normal.   Psychiatric:         Mood and Affect: Mood normal.         Speech: Speech normal.         Assessment/Plan:       Diagnoses and all orders for this visit:    1. Periodic headache syndrome, not intractable (Primary)  Assessment & Plan:  Headaches are improving with treatment.  Continue current treatment regimen.     Stable on Emgality and Maxalt               2. Obstructive sleep apnea syndrome  Assessment & Plan:  Stable                Reviewed medications, potential side effects and signs and symptoms to report. Discussed risk versus benefits of treatment plan with patient and/or family-including medications, labs and radiology that may be ordered. Addressed questions and concerns during visit. Patient and/or family verbalized understanding and agree with plan. Patient instructed to call the office with  questions or concerns and report to ED with life-threatening symptoms.     AS THE PROVIDER, I PERSONALLY WORE PPE DURING ENTIRE FACE TO FACE ENCOUNTER IN CLINIC WITH THE PATIENT. PATIENT ALSO WORE PPE DURING ENTIRE FACE TO FACE ENCOUNTER EXCEPT FOR A MAX OF 30 SECONDS DURING NEUROLOGICAL EVALUATION OF CRANIAL NERVES AND THEN MASK WAS PLACED BACK OVER PATIENT FACE FOR REMAINDER OF VISIT. I WASHED MY HANDS BEFORE AND AFTER VISIT.    During this visit the following were done:  Labs Reviewed []    Labs Ordered []    Radiology Reports Reviewed []    Radiology Ordered []    PCP Records Reviewed []    Referring Provider Records Reviewed []    ER Records Reviewed []    Hospital Records Reviewed []    History Obtained From Family []    Radiology Images Reviewed []    Other Reviewed []    Records Requested []      Return in about 1 year (around 2/15/2023).      Jennifer Rizzo, CASE  2/15/2022

## 2022-03-02 ENCOUNTER — SPECIALTY PHARMACY (OUTPATIENT)
Dept: ONCOLOGY | Facility: HOSPITAL | Age: 57
End: 2022-03-02

## 2022-03-02 NOTE — PROGRESS NOTES
Specialty Pharmacy Refill Coordination Note     Liliana is a 56 y.o. female contacted today regarding refills of  Emgality specialty medication(s). Patient is due for injection on 3/15.     Reviewed and verified with patient: Yes  Specialty medication(s) and dose(s) confirmed: yes    Refill Questions      Most Recent Value   Changes to allergies? No   Changes to medications? No   New conditions since last clinic visit No   Unplanned office visit, urgent care, ED, or hospital admission in the last 4 weeks  No   How does patient/caregiver feel medication is working? Excellent   Financial problems or insurance changes  No   If yes, describe changes in insurance or financial issues. N/A   How many doses of your specialty medications were missed in the last 4 weeks? 0   Why were doses missed? N/A   Does this patient require a clinical escalation to a pharmacist? No          Delivery Questions      Most Recent Value   Delivery method FedEx   Delivery address correct? Yes   Preferred delivery time? Anytime   Number of medications in delivery 1   Medication being filled and delivered [facility]   Doses left of specialty medications 0   Is there any medication that is due not being filled? No   Supplies needed? No supplies needed   Cooler needed? Yes   Do any medications need mixed or dated? No   Copay form of payment Payment plan already set up   Questions or concerns for the pharmacist? No   Are any medications first time fills? No            Medication Adherence    Adherence tools used: calendar  Support network for adherence: healthcare provider   Other support network: Pharmacy           Follow-up: Plan to reach out to patient in 28 days for a refill.       Michelle Barbosa, Pharmacy Technician  Specialty Pharmacy Technician

## 2022-03-09 DIAGNOSIS — J30.2 SEASONAL ALLERGIES: ICD-10-CM

## 2022-03-09 RX ORDER — LEVOCETIRIZINE DIHYDROCHLORIDE 5 MG/1
TABLET, FILM COATED ORAL
Qty: 30 TABLET | Refills: 4 | Status: SHIPPED | OUTPATIENT
Start: 2022-03-09 | End: 2022-08-12

## 2022-04-04 RX ORDER — RIZATRIPTAN BENZOATE 10 MG/1
10 TABLET ORAL ONCE
Qty: 12 TABLET | Refills: 5 | Status: SHIPPED | OUTPATIENT
Start: 2022-04-04 | End: 2022-11-07 | Stop reason: SDUPTHER

## 2022-04-04 NOTE — TELEPHONE ENCOUNTER
Rx Refill Note  Requested Prescriptions     Pending Prescriptions Disp Refills   • rizatriptan (MAXALT) 10 MG tablet 12 tablet 5     Sig: Take 1 tablet by mouth 1 (One) Time for 1 dose. AT ONSET OF HEADACHE MAY REPEAT ONE TABLET IN 2 HOURS IF NEEDED      Last office visit with prescribing clinician: 2/8/2021      Next office visit with prescribing clinician: 2/16/2023            Carmelita Chanel MA  04/04/22, 14:31 EDT   lasyt filled:6/1/21 with 5 refills sent this in.

## 2022-04-05 ENCOUNTER — SPECIALTY PHARMACY (OUTPATIENT)
Dept: ONCOLOGY | Facility: HOSPITAL | Age: 57
End: 2022-04-05

## 2022-04-05 NOTE — PROGRESS NOTES
Specialty Pharmacy Refill Coordination Note     Liliana is a 56 y.o. female contacted today regarding refills of  Emgality specialty medication(s). Patient is due for injection on 4/12.      Reviewed and verified with patient:         Specialty medication(s) and dose(s) confirmed: yes    Refill Questions    Flowsheet Row Most Recent Value   Changes to allergies? No   Changes to medications? No   New conditions since last clinic visit No   Unplanned office visit, urgent care, ED, or hospital admission in the last 4 weeks  No   How does patient/caregiver feel medication is working? Excellent   Financial problems or insurance changes  No   If yes, describe changes in insurance or financial issues. N/A   How many doses of your specialty medications were missed in the last 4 weeks? 0   Why were doses missed? N/A   Does this patient require a clinical escalation to a pharmacist? No          Delivery Questions    Flowsheet Row Most Recent Value   Delivery method FedEx   Delivery address correct? Yes   Preferred delivery time? Anytime   Number of medications in delivery 1   Medication being filled and delivered [facility]   Doses left of specialty medications 0   Is there any medication that is due not being filled? No   Supplies needed? No supplies needed   Cooler needed? Yes   Do any medications need mixed or dated? No   Copay form of payment Payment plan already set up   Questions or concerns for the pharmacist? No   Are any medications first time fills? No            Medication Adherence    Adherence tools used: calendar  Support network for adherence: healthcare provider   Other support network: Pharmacy           Follow-up: 28 day(s)     Michelle Barbosa, Pharmacy Technician  Specialty Pharmacy Technician

## 2022-04-08 DIAGNOSIS — J30.9 ALLERGIC RHINITIS: ICD-10-CM

## 2022-04-08 RX ORDER — MONTELUKAST SODIUM 10 MG/1
TABLET ORAL
Qty: 30 TABLET | Refills: 5 | Status: SHIPPED | OUTPATIENT
Start: 2022-04-08 | End: 2022-10-05

## 2022-04-14 ENCOUNTER — OFFICE VISIT (OUTPATIENT)
Dept: INTERNAL MEDICINE | Facility: CLINIC | Age: 57
End: 2022-04-14

## 2022-04-14 VITALS
RESPIRATION RATE: 20 BRPM | TEMPERATURE: 97.7 F | BODY MASS INDEX: 38.17 KG/M2 | DIASTOLIC BLOOD PRESSURE: 70 MMHG | OXYGEN SATURATION: 98 % | WEIGHT: 229.4 LBS | HEART RATE: 79 BPM | SYSTOLIC BLOOD PRESSURE: 128 MMHG

## 2022-04-14 DIAGNOSIS — E78.00 PURE HYPERCHOLESTEROLEMIA: Primary | ICD-10-CM

## 2022-04-14 DIAGNOSIS — M79.642 BILATERAL HAND PAIN: ICD-10-CM

## 2022-04-14 DIAGNOSIS — M79.641 BILATERAL HAND PAIN: ICD-10-CM

## 2022-04-14 DIAGNOSIS — M23.52 RECURRENT LEFT KNEE INSTABILITY: ICD-10-CM

## 2022-04-14 PROCEDURE — 99213 OFFICE O/P EST LOW 20 MIN: CPT | Performed by: INTERNAL MEDICINE

## 2022-04-15 NOTE — PROGRESS NOTES
"Chief Complaint  Hyperlipidemia (6 month f/u)    Subjective    Liliana Quevedo is a 56 y.o. female.     Liliana Quevedo presents to [facility] for for hyperlipidemia follow-up.    History of Present Illness    1. Bilateral hands pain- She complains of bilateral hands swelling and pain described as aching for over 2 months. She specifies that the left hand is worse than the right hand.    2. Left patella pain- She states that she has had this pain for over 1 year and that it \"gives out\" on occasion. She adds that it has worsened.    3. Weight loss- She states that she desires to lose weight. She adds that her exercise routine was interrupted by the pandemic as well as the death of her father in 07/2021.    The following portions of the patient's history were reviewed and updated as appropriate: allergies, current medications, past family history, past medical history, past social history, past surgical history and problem list.    Review of Systems:  A review of systems was performed, and pertinent findings are noted in the HPI.    Objective   Vital Signs:   /70 (BP Location: Right arm, Patient Position: Sitting, Cuff Size: Adult)   Pulse 79   Temp 97.7 °F (36.5 °C) (Infrared)   Resp 20   Wt 104 kg (229 lb 6.4 oz)   SpO2 98%   BMI 38.17 kg/m²     Body mass index is 38.17 kg/m².    Physical Exam  Constitutional:       General: She is not in acute distress.  HENT:      Mouth/Throat:      Mouth: Mucous membranes are moist.   Cardiovascular:      Heart sounds: S1 normal and S2 normal. No murmur heard.    No friction rub. No gallop.   Pulmonary:      Effort: Pulmonary effort is normal.      Breath sounds: Normal breath sounds. No wheezing or rhonchi.   Musculoskeletal:      Comments: Patient has arthritic symptoms in her hands but has good  strength, transitional strength as well.     Neurological:      Mental Status: She is alert.           Assessment and " Plan  Diagnoses and all orders for this visit:    1. Recurrent left knee instability.  - I am going to have the patient go to physical therapy for rehabilitation and core-strengthening exercises, and if it still continues, then we will go ahead and consider imaging study. Otherwise, I will see Miss Quevedo back in approximately 3 to 4 months or sooner if needed.     Follow Up   No follow-ups on file.  Patient was given instructions and counseling regarding her condition or for health maintenance advice. Please see specific information pulled into the AVS if appropriate.       Transcribed from ambient dictation for Geremias Ramesh MD by Savanna Rosas.  04/15/22   09:24 EDT    Patient verbalized consent to the visit recording.  I have personally performed the services described in this document as transcribed by the above individual, and it is both accurate and complete.  Geremias Ramesh MD  4/16/2022  07:34 EDT

## 2022-04-21 DIAGNOSIS — M23.52 RECURRENT LEFT KNEE INSTABILITY: ICD-10-CM

## 2022-04-21 DIAGNOSIS — M79.641 BILATERAL HAND PAIN: Primary | ICD-10-CM

## 2022-04-21 DIAGNOSIS — M79.642 BILATERAL HAND PAIN: Primary | ICD-10-CM

## 2022-05-02 ENCOUNTER — SPECIALTY PHARMACY (OUTPATIENT)
Dept: ONCOLOGY | Facility: HOSPITAL | Age: 57
End: 2022-05-02

## 2022-05-02 NOTE — PROGRESS NOTES
Specialty Pharmacy Refill Coordination Note     Liliana is a 56 y.o. female contacted today regarding refills of  Emgality specialty medication(s). Patient is due for injection on 5/7.      Reviewed and verified with patient:         Specialty medication(s) and dose(s) confirmed: yes    Refill Questions    Flowsheet Row Most Recent Value   Changes to allergies? No   Changes to medications? No   New conditions since last clinic visit No   Unplanned office visit, urgent care, ED, or hospital admission in the last 4 weeks  No   How does patient/caregiver feel medication is working? Excellent   Financial problems or insurance changes  No   If yes, describe changes in insurance or financial issues. N/A   How many doses of your specialty medications were missed in the last 4 weeks? 0   Why were doses missed? N/A   Does this patient require a clinical escalation to a pharmacist? No          Delivery Questions    Flowsheet Row Most Recent Value   Delivery method FedEx   Delivery address correct? Yes   Preferred delivery time? Anytime   Number of medications in delivery 1   Medication being filled and delivered [facility]   Doses left of specialty medications 0   Is there any medication that is due not being filled? No   Supplies needed? No supplies needed   Cooler needed? Yes   Do any medications need mixed or dated? No   Copay form of payment Payment plan already set up   Questions or concerns for the pharmacist? No   Are any medications first time fills? No            Medication Adherence    Adherence tools used: calendar  Support network for adherence: healthcare provider   Other support network: Pharmacy           Follow-up: 28 day(s)     Michelle Barbosa, Pharmacy Technician  Specialty Pharmacy Technician

## 2022-05-31 ENCOUNTER — SPECIALTY PHARMACY (OUTPATIENT)
Dept: ONCOLOGY | Facility: HOSPITAL | Age: 57
End: 2022-05-31

## 2022-05-31 NOTE — PROGRESS NOTES
Specialty Pharmacy Refill Coordination Note     Liliana is a 56 y.o. female contacted today regarding refills of  Emgality specialty medication(s). Patient is due for injection on 6/4.      Reviewed and verified with patient:         Specialty medication(s) and dose(s) confirmed: yes    Refill Questions    Flowsheet Row Most Recent Value   Changes to allergies? No   Changes to medications? No   New conditions since last clinic visit No   Unplanned office visit, urgent care, ED, or hospital admission in the last 4 weeks  No   How does patient/caregiver feel medication is working? Excellent   Financial problems or insurance changes  No   If yes, describe changes in insurance or financial issues. N/A   Since the previous refill, were any specialty medication doses or scheduled injections missed or delayed?  No   If yes, please provide the amount N/A   Why were doses missed? N/A   Does this patient require a clinical escalation to a pharmacist? No          Delivery Questions    Flowsheet Row Most Recent Value   Delivery method FedEx   Delivery address correct? Yes   Preferred delivery time? Anytime   Number of medications in delivery 1   Medication being filled and delivered [facility]   Doses left of specialty medications 0   Is there any medication that is due not being filled? No   Supplies needed? No supplies needed   Cooler needed? Yes   Do any medications need mixed or dated? No   Copay form of payment Payment plan already set up   Questions or concerns for the pharmacist? No   Are any medications first time fills? No            Medication Adherence    Adherence tools used: calendar  Support network for adherence: healthcare provider   Other support network: Pharmacy           Follow-up: 28 day(s)     Michelle Barbosa, Pharmacy Technician  Specialty Pharmacy Technician

## 2022-06-01 ENCOUNTER — SPECIALTY PHARMACY (OUTPATIENT)
Dept: ONCOLOGY | Facility: HOSPITAL | Age: 57
End: 2022-06-01

## 2022-06-01 NOTE — PROGRESS NOTES
"   Specialty Pharmacy Patient Management Program  Neurology Reassessment     Liliana Quevedo is a 56 y.o. female with migraines seen by a Neurology provider and enrolled in the Neurology Patient Management program offered by [facility].  A follow-up outreach was conducted, including assessment of continued therapy appropriateness, medication adherence, and side effect incidence and management for Emgality.     Changes to Insurance Coverage or Financial Support  [employer], Emgality co-pay card     Relevant Past Medical History and Comorbidities  Relevant medical history and concomitant health conditions were discussed with the patient. The patient's chart has been reviewed for relevant past medical history and comorbid health conditions and updated as necessary.   Past Medical History:   Diagnosis Date   • Acute sinusitis     Assessed By: Geremias Ramesh (Internal Medicine); Last Assessed: 23 Jan 2015   • Allergic rhinitis    • Carpal tunnel syndrome    • Colon polyp     SIGMOID   • DDD (degenerative disc disease), lumbar    • De Quervain's tenosynovitis    • Depression    • Edema     Assessed By: Geremias Ramesh (Internal Medicine); Last Assessed: 26 Nov 2014   • Elevated alkaline phosphatase level     102   • Fatigue    • Gastric ulcer    • GERD (gastroesophageal reflux disease)     on Protonix.  Says sx's not bad even if misses meds, \"depends on what I eat\".  EGD GDW 6/16 40 cm, path DE reflux.  serum h. pyl neg   • Hematuria    • Hyperlipidemia    • Hypertension     per prim MD note   • Hypothyroidism     w/ hx thyromegaly s/p CASTILLO.    • Incomplete right bundle branch block    • Low back pain    • Mild cardiomegaly    • Morbid obesity (HCC)    • Nephrolithiasis    • Osteoarthritis    • Peripheral edema    • Polyp of sigmoid colon    • Scoliosis    • Sleep apnea     CPAP compliant   • Tendinitis    • Viral URI     Assessed By: Geremias Ramesh (Internal Medicine); Last Assessed: 23 Jan 2015   • " Vitamin D deficiency    • Weight gain     Assessed By: Geremias Ramesh (Internal Medicine); Last Assessed: 26 Nov 2014     Social History     Socioeconomic History   • Marital status:    Tobacco Use   • Smoking status: Never Smoker   • Smokeless tobacco: Never Used   Vaping Use   • Vaping Use: Never used   Substance and Sexual Activity   • Alcohol use: No   • Drug use: No   • Sexual activity: Defer       Problem list reviewed by Susan Turcios PharmD on 6/1/2022 at  3:36 PM    Allergies  Known allergies and reactions were discussed with the patient. The patient's chart has been reviewed for allergy information and updated as necessary.   Ampicillin, Bactrim [sulfamethoxazole-trimethoprim], and Sulfa antibiotics    Allergies reviewed by Susan Turcios PharmD on 6/1/2022 at  3:34 PM    Relevant Laboratory Values    Common labs    Common Labsle 10/7/21 10/7/21 10/7/21    1026 1026 1026   Glucose  78    BUN  13    Creatinine  0.81    eGFR Non African Am  73    Sodium  141    Potassium  4.3    Chloride  103    Calcium  9.5    Albumin  4.30    Total Bilirubin  0.4    Alkaline Phosphatase  118 (A)    AST (SGOT)  19    ALT (SGPT)  14    WBC 3.36 (A)     Hemoglobin 13.3     Hematocrit 41.6     Platelets 211     Total Cholesterol   206 (A)   Triglycerides   74   HDL Cholesterol   53   LDL Cholesterol    140 (A)   (A) Abnormal value                Current Medication List  This medication list has been reviewed with the patient and evaluated for any interactions or necessary modifications/recommendations, and updated to include all prescription medications, OTC medications, and supplements the patient is currently taking.  This list reflects what is contained in the patient's profile, which has also been marked as reviewed to communicate to other providers it is the most up to date version of the patient's current medication therapy.     Current Outpatient Medications:   •  citalopram (CeleXA) 40 MG tablet, Take 1  tablet by mouth Daily., Disp: 90 tablet, Rfl: 3  •  famotidine (PEPCID) 20 MG tablet, Take 20 mg by mouth Daily., Disp: , Rfl:   •  galcanezumab-gnlm (EMGALITY) 120 MG/ML auto-injector pen, Inject 1 mL under the skin into the appropriate area as directed Every 28 (Twenty-Eight) Days., Disp: 1 mL, Rfl: 11  •  ibandronate (BONIVA) 150 MG tablet, ibandronate 150 mg tablet  Take 1 tablet every month by oral route for 90 days., Disp: , Rfl:   •  levocetirizine (XYZAL) 5 MG tablet, TAKE ONE TABLET BY MOUTH EVERY EVENING, Disp: 30 tablet, Rfl: 4  •  levothyroxine (SYNTHROID, LEVOTHROID) 137 MCG tablet, Take 1 tablet by mouth Daily., Disp: 90 tablet, Rfl: 3  •  montelukast (SINGULAIR) 10 MG tablet, TAKE ONE TABLET BY MOUTH ONCE NIGHTLY, Disp: 30 tablet, Rfl: 5  •  rizatriptan (MAXALT) 10 MG tablet, Take 1 tablet by mouth 1 (One) Time for 1 dose. AT ONSET OF HEADACHE MAY REPEAT ONE TABLET IN 2 HOURS IF NEEDED, Disp: 12 tablet, Rfl: 5    Medicines reviewed by Susan Turcios, PharmD on 6/1/2022 at  3:35 PM  Medicines reviewed by Susan Turcios, PharmD on 6/1/2022 at  3:35 PM    Drug Interactions  none     Adverse Drug Reactions  • Adverse Reactions Experienced: none   • Plan for ADR Management: not required    Hospitalizations and Urgent Care Since Last Assessment  • Hospitalizations or Admissions: none   • ED Visits: none  • Urgent Office Visits: none     Adherence and Self-Administration  Adherence related patient's specialty therapy was discussed with the patient. The Adherence segment of this outreach has been reviewed and updated.     Medication Adherence    Patient reported X missed doses in the last 7 days: 0  Any gaps in refill history greater than 2 weeks in the last 3 months: no  Demonstrates understanding of importance of adherence: yes  Informant: patient  Reliability of informant: reliable  Estimated medication adherence level: %  Adherence estimation source: claims history  Reasons for non-adherence: no  problems identified  Adherence tools used: calendar  Support network for adherence: healthcare provider   Other support networks: Pharmacy         • Ongoing or New Barriers to Patient Adherence and/or Self-Administration: none   • Methods for Supporting Patient Adherence and/or Self-Administration: Patient trained with CGRP self-injections in office prior to starting therapy, and is adept with Emgality self-injection.     Goals of Therapy  Goals related to the patient's specialty therapy was discussed with the patient. The Patient Goals segment of this outreach has been reviewed and updated.    Goals     • Specialty Pharmacy General Goal      Migraine symptom management and better diet and exercise               Quality of Life Assessment   Quality of Life related to the patient's specialty therapy was discussed with the patient. The QOL segment of this outreach has been reviewed and updated.     Quality of Life Assessment  Quality of Life Improvement Scale: Moderately better    Reassessment Plan & Follow-Up  1. Medication Therapy Changes: none  2. Additional Plans, Therapy Recommendations, or Therapy Problems to Be Addressed: none  3. Pharmacist to perform regular reassessments no more than (6) months from the previous assessment.  4. Care Coordinator to set up future refill outreaches, coordinate prescription delivery, and escalate clinical questions to pharmacist.     Attestation  I attest that the specialty medication(s) addressed above are appropriate for the patient based on my reassessment.  If the prescribed therapy is at any point deemed not appropriate based on the current or future assessments, a consultation will be initiated with the patient's specialty care provider to determine the best course of action. The revised plan of therapy will be documented along with any additional patient education provided.     Susan Turcios, PharmD  6/1/2022  15:46 EDT

## 2022-06-28 ENCOUNTER — SPECIALTY PHARMACY (OUTPATIENT)
Dept: ONCOLOGY | Facility: HOSPITAL | Age: 57
End: 2022-06-28

## 2022-06-28 NOTE — PROGRESS NOTES
Specialty Pharmacy Refill Coordination Note     Liliana is a 56 y.o. female contacted today regarding refills of  Emgality specialty medication(s). Patient is due for injection on 7/2.      Reviewed and verified with patient:         Specialty medication(s) and dose(s) confirmed: yes    Refill Questions    Flowsheet Row Most Recent Value   Changes to allergies? No   Changes to medications? No   New conditions since last clinic visit No   Unplanned office visit, urgent care, ED, or hospital admission in the last 4 weeks  No   How does patient/caregiver feel medication is working? Excellent   Financial problems or insurance changes  No   If yes, describe changes in insurance or financial issues. N/A   Since the previous refill, were any specialty medication doses or scheduled injections missed or delayed?  No   If yes, please provide the amount N/A   Why were doses missed? N/A   Does this patient require a clinical escalation to a pharmacist? No          Delivery Questions    Flowsheet Row Most Recent Value   Delivery method FedEx   Delivery address correct? Yes   Preferred delivery time? Anytime   Number of medications in delivery 1   Medication being filled and delivered [facility]   Doses left of specialty medications 0   Is there any medication that is due not being filled? No   Supplies needed? No supplies needed   Cooler needed? Yes   Do any medications need mixed or dated? No   Copay form of payment Payment plan already set up   Questions or concerns for the pharmacist? No   Are any medications first time fills? No            Medication Adherence    Adherence tools used: calendar  Support network for adherence: healthcare provider   Other support network: Pharmacy           Follow-up: 28 day(s)     Michelle Barbosa, Pharmacy Technician  Specialty Pharmacy Technician

## 2022-07-01 ENCOUNTER — LAB (OUTPATIENT)
Dept: LAB | Facility: HOSPITAL | Age: 57
End: 2022-07-01

## 2022-07-01 ENCOUNTER — OFFICE VISIT (OUTPATIENT)
Dept: ENDOCRINOLOGY | Facility: CLINIC | Age: 57
End: 2022-07-01

## 2022-07-01 VITALS
SYSTOLIC BLOOD PRESSURE: 112 MMHG | HEART RATE: 54 BPM | DIASTOLIC BLOOD PRESSURE: 68 MMHG | HEIGHT: 65 IN | OXYGEN SATURATION: 98 % | BODY MASS INDEX: 39.32 KG/M2 | WEIGHT: 236 LBS

## 2022-07-01 DIAGNOSIS — E89.0 POSTABLATIVE HYPOTHYROIDISM: Primary | ICD-10-CM

## 2022-07-01 DIAGNOSIS — E89.0 POSTABLATIVE HYPOTHYROIDISM: ICD-10-CM

## 2022-07-01 LAB — TSH SERPL DL<=0.05 MIU/L-ACNC: 1.24 UIU/ML (ref 0.27–4.2)

## 2022-07-01 PROCEDURE — 99213 OFFICE O/P EST LOW 20 MIN: CPT | Performed by: INTERNAL MEDICINE

## 2022-07-01 PROCEDURE — 84443 ASSAY THYROID STIM HORMONE: CPT | Performed by: INTERNAL MEDICINE

## 2022-07-01 NOTE — PROGRESS NOTES
"     Office Note      Date: 2022  Patient Name: Liliana Quevedo  MRN: [id number]  : 1965    Chief Complaint   Patient presents with   • Hypothyroidism       History of Present Illness:   Liliana Quevedo is a 56 y.o. female who presents for Hypothyroidism   she is here for annual follow up  She is on 137 mcg per day  --  There has been no change to her medical history  --  She has no questions for me     Subjective      .    Review of Systems:   Review of Systems   Constitutional: Positive for fatigue and unexpected weight change.   HENT: Negative for trouble swallowing and voice change.    Eyes: Negative for visual disturbance.   Cardiovascular: Negative for palpitations.   Endocrine: Negative for cold intolerance and heat intolerance.   Neurological: Negative for tremors.   Psychiatric/Behavioral: Negative for dysphoric mood and sleep disturbance. The patient is not nervous/anxious.        The following portions of the patient's history were reviewed and updated as appropriate: allergies, current medications, past family history, past medical history, past social history, past surgical history and problem list.    Objective     Visit Vitals  /68   Pulse 54   Ht 165.1 cm (65\")   Wt 107 kg (236 lb)   SpO2 98%   BMI 39.27 kg/m²       Labs:    CBC w/DIFF  Lab Results   Component Value Date    WBC 3.36 (L) 10/07/2021    RBC 4.74 10/07/2021    HGB 13.3 10/07/2021    HCT 41.6 10/07/2021    MCV 87.8 10/07/2021    MCH 28.1 10/07/2021    MCHC 32.0 10/07/2021    RDW 13.4 10/07/2021    RDWSD 43.0 10/07/2021    MPV 10.4 10/07/2021     10/07/2021    NEUTRORELPCT 54.8 2018    LYMPHORELPCT 37.3 2018    MONORELPCT 6.0 2018    EOSRELPCT 1.6 2018    BASORELPCT 0.3 2018    AUTOIGPER 0.1 2017    NEUTROABS 2.10 2018    LYMPHSABS 1.43 2018    MONOSABS 0.23 2018    EOSABS 0.06 2018    BASOSABS 0.01 2018    AUTOIGNUM 0.01 2017 "       T4  Free T4   Date Value Ref Range Status   10/07/2021 1.37 0.93 - 1.70 ng/dL Final       TSH  No results found for: TSHBASE     Physical Exam:  Physical Exam  Vitals reviewed.   HENT:      Head: Normocephalic and atraumatic.   Eyes:      Extraocular Movements: Extraocular movements intact.   Neck:      Comments: No  Palpable thyroid tissue  Cardiovascular:      Rate and Rhythm: Bradycardia present.   Lymphadenopathy:      Cervical: No cervical adenopathy.   Neurological:      Mental Status: She is alert.         Assessment / Plan      Assessment & Plan:  Problem List Items Addressed This Visit        Other    Hypothyroidism - Primary    Overview     folloing I 131 therapy for hyperthyroidism            Current Assessment & Plan     Clinically euthyroid. Thyroid levels ordered. Medication to be adjusted accordingly.           Relevant Medications    levothyroxine (SYNTHROID, LEVOTHROID) 137 MCG tablet    Other Relevant Orders    TSH           Samm Ashley MD   07/01/2022

## 2022-07-05 DIAGNOSIS — E03.9 HYPOTHYROIDISM, UNSPECIFIED TYPE: ICD-10-CM

## 2022-07-05 RX ORDER — LEVOTHYROXINE SODIUM 137 UG/1
137 TABLET ORAL DAILY
Qty: 90 TABLET | Refills: 3 | Status: SHIPPED | OUTPATIENT
Start: 2022-07-05

## 2022-07-25 ENCOUNTER — SPECIALTY PHARMACY (OUTPATIENT)
Dept: ONCOLOGY | Facility: HOSPITAL | Age: 57
End: 2022-07-25

## 2022-07-25 NOTE — PROGRESS NOTES
Specialty Pharmacy Refill Coordination Note     Liliana is a 56 y.o. female contacted today regarding refills of  Emgality specialty medication(s). Patient is due for injection on 7/30.      Reviewed and verified with patient:         Specialty medication(s) and dose(s) confirmed: yes    Refill Questions    Flowsheet Row Most Recent Value   Changes to allergies? No   Changes to medications? No   New conditions since last clinic visit No   Unplanned office visit, urgent care, ED, or hospital admission in the last 4 weeks  No   How does patient/caregiver feel medication is working? Excellent   Financial problems or insurance changes  No   If yes, describe changes in insurance or financial issues. N/A   Since the previous refill, were any specialty medication doses or scheduled injections missed or delayed?  No   If yes, please provide the amount N/a   Why were doses missed? N/A   Does this patient require a clinical escalation to a pharmacist? No          Delivery Questions    Flowsheet Row Most Recent Value   Delivery method FedEx   Delivery address correct? Yes   Preferred delivery time? Anytime   Number of medications in delivery 1   Medication being filled and delivered Emgality   Doses left of specialty medications 0   Is there any medication that is due not being filled? No   Supplies needed? No supplies needed   Cooler needed? Yes   Do any medications need mixed or dated? No   Copay form of payment Payment plan already set up   Questions or concerns for the pharmacist? No   Are any medications first time fills? No            Medication Adherence    Adherence tools used: calendar  Support network for adherence: healthcare provider   Other support network: Pharmacy           Follow-up: 28 day(s)     Michelle Barbosa, Pharmacy Technician  Specialty Pharmacy Technician

## 2022-08-11 DIAGNOSIS — J30.2 SEASONAL ALLERGIES: ICD-10-CM

## 2022-08-12 RX ORDER — LEVOCETIRIZINE DIHYDROCHLORIDE 5 MG/1
TABLET, FILM COATED ORAL
Qty: 30 TABLET | Refills: 4 | Status: SHIPPED | OUTPATIENT
Start: 2022-08-12 | End: 2023-01-09

## 2022-08-18 ENCOUNTER — SPECIALTY PHARMACY (OUTPATIENT)
Dept: ONCOLOGY | Facility: HOSPITAL | Age: 57
End: 2022-08-18

## 2022-08-18 NOTE — PROGRESS NOTES
Specialty Pharmacy Refill Coordination Note     Liliana is a 56 y.o. female contacted today regarding refills of Emgality specialty medication(s). Patient's last injection of Emgality was given on 7/30/2022. Patient's next injection of Emgality due 8/27/2022.    Reviewed and verified with patient: Yes  Specialty medication(s) and dose(s) confirmed: yes    Refill Questions    Flowsheet Row Most Recent Value   Changes to allergies? No   Changes to medications? No   New conditions since last clinic visit Yes  [Recent COVID infection. [facility] visit about 1 week ago. Patient is recovering.]   Unplanned office visit, urgent care, ED, or hospital admission in the last 4 weeks  Yes  [Recent COVID infection. [facility] visit about 1 week ago. Patient is recovering.]   Financial problems or insurance changes  No   If yes, describe changes in insurance or financial issues. N/A   Since the previous refill, were any specialty medication doses or scheduled injections missed or delayed?  No   If yes, please provide the amount N/A   Why were doses missed? N/A   Does this patient require a clinical escalation to a pharmacist? Yes  [Recent COVID infection. [facility] visit about 1 week ago. Patient is recovering.]          Delivery Questions    Flowsheet Row Most Recent Value   Delivery method FedEx   Delivery address correct? Yes   Preferred delivery time? Anytime   Number of medications in delivery 1   Medication being filled and delivered Emgality   Doses left of specialty medications None. Once monthly injection.   Is there any medication that is due not being filled? No   Supplies needed? No supplies needed   Cooler needed? Yes   Do any medications need mixed or dated? No   Copay form of payment Credit card on file   Questions or concerns for the pharmacist? No   Are any medications first time fills? No            Medication Adherence    Adherence tools used: calendar  Support network for  adherence: healthcare provider   Other support network: Pharmacy           Follow-up:  28 days.     Rayne John, Pharmacy Technician  Specialty Pharmacy Technician

## 2022-09-08 ENCOUNTER — HOSPITAL ENCOUNTER (OUTPATIENT)
Dept: GENERAL RADIOLOGY | Facility: HOSPITAL | Age: 57
Discharge: HOME | End: 2022-09-08
Admitting: INTERNAL MEDICINE

## 2022-09-08 ENCOUNTER — OFFICE VISIT (OUTPATIENT)
Dept: INTERNAL MEDICINE | Facility: CLINIC | Age: 57
End: 2022-09-08

## 2022-09-08 VITALS
SYSTOLIC BLOOD PRESSURE: 112 MMHG | BODY MASS INDEX: 39.49 KG/M2 | DIASTOLIC BLOOD PRESSURE: 70 MMHG | HEIGHT: 65 IN | WEIGHT: 237 LBS | HEART RATE: 81 BPM | TEMPERATURE: 98.2 F | OXYGEN SATURATION: 90 % | RESPIRATION RATE: 20 BRPM

## 2022-09-08 DIAGNOSIS — G89.29 CHRONIC PAIN OF LEFT KNEE: ICD-10-CM

## 2022-09-08 DIAGNOSIS — M25.562 CHRONIC PAIN OF LEFT KNEE: ICD-10-CM

## 2022-09-08 DIAGNOSIS — M23.52 RECURRENT LEFT KNEE INSTABILITY: ICD-10-CM

## 2022-09-08 DIAGNOSIS — K21.9 GASTROESOPHAGEAL REFLUX DISEASE WITHOUT ESOPHAGITIS: Primary | ICD-10-CM

## 2022-09-08 PROCEDURE — 99214 OFFICE O/P EST MOD 30 MIN: CPT | Performed by: INTERNAL MEDICINE

## 2022-09-08 PROCEDURE — 73560 X-RAY EXAM OF KNEE 1 OR 2: CPT

## 2022-09-08 RX ORDER — PANTOPRAZOLE SODIUM 40 MG/1
40 TABLET, DELAYED RELEASE ORAL DAILY
Qty: 90 TABLET | Refills: 1 | Status: SHIPPED | OUTPATIENT
Start: 2022-09-08 | End: 2023-03-01

## 2022-09-08 RX ORDER — FAMOTIDINE 20 MG/1
20 TABLET, FILM COATED ORAL 2 TIMES DAILY
Qty: 60 TABLET | Refills: 3 | Status: SHIPPED | OUTPATIENT
Start: 2022-09-08 | End: 2023-01-03

## 2022-09-08 NOTE — PROGRESS NOTES
"Chief Complaint  Med Refill    Subjective    Liliana Quevedo is a 56 y.o. female.     Liliana Quevedo presents to [facility] for       History of Present Illness  Acid reflux. - The patient reports that she has been taking Pepcid twice daily; however, it is not helping. She states that she had a scope 1 year ago with Dr. Gagnon and he wanted to refer her to a dietician; however, she forgot to do that. She notes that she has not taken Protonix in 2 years secondary to her insurance not covering it. She admits that she does not eat late, she does not eat fried foods, or chips. She adds that she has heartburn symptoms when she is lying down at night, even when she drinks water. She states that she does not have more days of control while taking the Pepcid. She reports that Protonix helped her more than anything.    2. Left knee pain. - The patient reports that her left knee has been bothering her for approximately 2 years and it is getting worse. She states that she has tried Tylenol and it eases her a little bit; however, it does not completely resolve it. She notes that she has difficulty going up steps secondary to her knee. Years ago, she had a ligament that \"popped out\" and they had to put it back in.   The following portions of the patient's history were reviewed and updated as appropriate: allergies, current medications, past family history, past medical history, past social history, past surgical history and problem list.    Review of Systems   Gastrointestinal: Positive for GERD.   Musculoskeletal:        Left knee pain.       Objective   Vital Signs:   /70 (BP Location: Right arm, Patient Position: Sitting, Cuff Size: Adult)   Pulse 81   Temp 98.2 °F (36.8 °C) (Temporal)   Resp 20   Ht 165.1 cm (65\")   Wt 108 kg (237 lb)   SpO2 90%   BMI 39.44 kg/m²     Body mass index is 39.44 kg/m².    Physical Exam     Patient is alert x3, " non-distressed.  HEENT: Head is normocephalic, atraumatic. Pupils equal to light and accommodation. Extraocular muscles intact. Moist membranes. Neck was supple.  Chest clear to auscultation, no rhonchi, wheeze.  Cardiac exam: S1 and S2, no murmurs, rubs, or gallop.       Assessment and Plan  Diagnoses and all orders for this visit:    GERD/reflux.  Patient would like to go back on the Protonix as this medication was doing very well for her, but I will go ahead and instruct Protonix 40 mg by mouth once a day as well as famotidine 20 mg by mouth twice a day. These two medications together will help decrease the acid production and the exacerbation of GERD.    Recurrent left knee instability.  I did not find any knee instability on today's exam. Continue current medical management. In regards to her knee, because of the chronicity of it, I am going to get an x-ray just to make sure there are no other issues, so we will do that here today and follow up based on findings or other clinical symptoms.            Follow Up   No follow-ups on file.  Patient was given instructions and counseling regarding her condition or for health maintenance advice. Please see specific information pulled into the AVS if appropriate.          Transcribed from ambient dictation for Geremias Ramesh MD by GABRIEL MELO.  09/08/22   17:09 EDT    Patient verbalized consent to the visit recording.  I have personally performed the services described in this document as transcribed by the above individual, and it is both accurate and complete.  Geremias Ramesh MD  9/18/2022  16:39 EDT

## 2022-09-16 ENCOUNTER — SPECIALTY PHARMACY (OUTPATIENT)
Dept: ONCOLOGY | Facility: HOSPITAL | Age: 57
End: 2022-09-16

## 2022-09-16 NOTE — PROGRESS NOTES
Specialty Pharmacy Refill Coordination Note     Liliana is a 56 y.o. female contacted today regarding refills of Emgality specialty medication(s). Patient's last injection of Emgality was given on 8/27/2022. Patient's next injection of Emgality due 9/24/2022.    Reviewed and verified with patient: Yes  Specialty medication(s) and dose(s) confirmed: yes    Refill Questions    Flowsheet Row Most Recent Value   Changes to allergies? No   Changes to medications? Yes  [Patient started on pantoprazole and prescription famotidine on 9/8. Escalated to pharmacist.]   New conditions since last clinic visit No   Unplanned office visit, urgent care, ED, or hospital admission in the last 4 weeks  No   How does patient/caregiver feel medication is working? Very good   Financial problems or insurance changes  No   If yes, describe changes in insurance or financial issues. N/A   Since the previous refill, were any specialty medication doses or scheduled injections missed or delayed?  No   If yes, please provide the amount N/A   Why were doses missed? N/A   Does this patient require a clinical escalation to a pharmacist? Yes  [Patient started on pantoprazole and prescription famotidine on 9/8. Escalated to pharmacist.]          Delivery Questions    Flowsheet Row Most Recent Value   Delivery method FedEx   Delivery address correct? Yes   Preferred delivery time? Anytime   Number of medications in delivery 1   Medication being filled and delivered Emgality   Doses left of specialty medications None. Once monthly injection.   Is there any medication that is due not being filled? No   Supplies needed? No supplies needed   Cooler needed? Yes   Do any medications need mixed or dated? No   Copay form of payment Credit card on file   Questions or concerns for the pharmacist? No   Are any medications first time fills? No            Medication Adherence    Adherence tools used: calendar  Support network for adherence: healthcare provider    Other support network: Pharmacy           Follow-up:  28 days.     Rayne John, Pharmacy Technician  Specialty Pharmacy Technician

## 2022-09-16 NOTE — PROGRESS NOTES
Refill outreach escalated to pharmacist: Patient started on pantoprazole 40 mg po once daily and prescription famotidine 20 mg po bid on 9/8.  Medications on patient's med profile and dur ran.  Ok to proceed with Emgality refill.  Susan Turcios, PharmD  9/16/2022

## 2022-09-28 DIAGNOSIS — F33.0 MILD EPISODE OF RECURRENT MAJOR DEPRESSIVE DISORDER: ICD-10-CM

## 2022-09-28 RX ORDER — CITALOPRAM 40 MG/1
TABLET ORAL
Qty: 90 TABLET | Refills: 3 | Status: SHIPPED | OUTPATIENT
Start: 2022-09-28

## 2022-10-05 DIAGNOSIS — J30.9 ALLERGIC RHINITIS: ICD-10-CM

## 2022-10-05 RX ORDER — MONTELUKAST SODIUM 10 MG/1
TABLET ORAL
Qty: 30 TABLET | Refills: 5 | Status: SHIPPED | OUTPATIENT
Start: 2022-10-05 | End: 2023-04-03

## 2022-10-12 ENCOUNTER — SPECIALTY PHARMACY (OUTPATIENT)
Dept: ONCOLOGY | Facility: HOSPITAL | Age: 57
End: 2022-10-12

## 2022-10-12 NOTE — PROGRESS NOTES
Specialty Pharmacy Refill Coordination Note     Liliana is a 57 y.o. female contacted today regarding refills of  Emgality specialty medication(s). Patient is due for injection on 10/22.      Reviewed and verified with patient:       Specialty medication(s) and dose(s) confirmed: yes    Refill Questions    Flowsheet Row Most Recent Value   Changes to allergies? No   Changes to medications? No   New conditions since last clinic visit No   Unplanned office visit, urgent care, ED, or hospital admission in the last 4 weeks  No   How does patient/caregiver feel medication is working? Excellent   Financial problems or insurance changes  No   If yes, describe changes in insurance or financial issues. N/A   Since the previous refill, were any specialty medication doses or scheduled injections missed or delayed?  No   If yes, please provide the amount N/A   Why were doses missed? N/A   Does this patient require a clinical escalation to a pharmacist? No          Delivery Questions    Flowsheet Row Most Recent Value   Delivery method FedEx   Delivery address correct? Yes   Preferred delivery time? Anytime   Number of medications in delivery 1   Medication being filled and delivered Emgality   Doses left of specialty medications 0   Is there any medication that is due not being filled? No   Supplies needed? No supplies needed   Cooler needed? Yes   Do any medications need mixed or dated? No   Copay form of payment Payment plan already set up   Questions or concerns for the pharmacist? No   Are any medications first time fills? No            Medication Adherence    Adherence tools used: calendar  Support network for adherence: healthcare provider   Other support network: Pharmacy           Follow-up: 28 day(s)     Michelle Barbosa, Pharmacy Technician  Specialty Pharmacy Technician

## 2022-11-03 ENCOUNTER — SPECIALTY PHARMACY (OUTPATIENT)
Dept: ONCOLOGY | Facility: HOSPITAL | Age: 57
End: 2022-11-03

## 2022-11-03 NOTE — PROGRESS NOTES
"   Specialty Pharmacy Patient Management Program  Neurology Reassessment     Liliana Quevedo is a 57 y.o. female with migraines seen by a Neurology provider and enrolled in the Neurology Patient Management program offered by [facility].  A follow-up outreach was conducted, including assessment of continued therapy appropriateness, medication adherence, and side effect incidence and management for  Emgality.     Changes to Insurance Coverage or Financial Support  [employer] [place] [employer]     Relevant Past Medical History and Comorbidities  Relevant medical history and concomitant health conditions were discussed with the patient. The patient's chart has been reviewed for relevant past medical history and comorbid health conditions and updated as necessary.   Past Medical History:   Diagnosis Date   • Acute sinusitis     Assessed By: Geremias Ramesh (Internal Medicine); Last Assessed: 23 Jan 2015   • Allergic rhinitis    • Carpal tunnel syndrome    • Colon polyp     SIGMOID   • DDD (degenerative disc disease), lumbar    • De Quervain's tenosynovitis    • Depression    • Edema     Assessed By: Geremias Ramesh (Internal Medicine); Last Assessed: 26 Nov 2014   • Elevated alkaline phosphatase level     102   • Fatigue    • Gastric ulcer    • GERD (gastroesophageal reflux disease)     on Protonix.  Says sx's not bad even if misses meds, \"depends on what I eat\".  EGD GDW 6/16 40 cm, path DE reflux.  serum h. pyl neg   • Hematuria    • Hyperlipidemia    • Hypertension     per prim MD note   • Hypothyroidism     w/ hx thyromegaly s/p CASTILLO.    • Incomplete right bundle branch block    • Low back pain    • Mild cardiomegaly    • Morbid obesity (HCC)    • Nephrolithiasis    • Osteoarthritis    • Peripheral edema    • Polyp of sigmoid colon    • Scoliosis    • Sleep apnea     CPAP compliant   • Tendinitis    • Viral URI     Assessed By: Geremias Ramesh (Internal Medicine); Last Assessed: 23 Jan " 2015   • Vitamin D deficiency    • Weight gain     Assessed By: Geremias Ramesh (Internal Medicine); Last Assessed: 26 Nov 2014     Social History     Socioeconomic History   • Marital status:    Tobacco Use   • Smoking status: Never   • Smokeless tobacco: Never   Vaping Use   • Vaping Use: Never used   Substance and Sexual Activity   • Alcohol use: No   • Drug use: No   • Sexual activity: Defer       Problem list reviewed by Susan Turcios PharmD on 11/3/2022 at  1:59 PM    Allergies  Known allergies and reactions were discussed with the patient. The patient's chart has been reviewed for allergy information and updated as necessary.   Ampicillin, Bactrim [sulfamethoxazole-trimethoprim], and Sulfa antibiotics    Allergies reviewed by Susan Turcios PharmD on 11/3/2022 at  1:56 PM    Relevant Laboratory Values          Current Medication List  This medication list has been reviewed with the patient and evaluated for any interactions or necessary modifications/recommendations, and updated to include all prescription medications, OTC medications, and supplements the patient is currently taking.  This list reflects what is contained in the patient's profile, which has also been marked as reviewed to communicate to other providers it is the most up to date version of the patient's current medication therapy.     Current Outpatient Medications:   •  citalopram (CeleXA) 40 MG tablet, TAKE ONE TABLET BY MOUTH DAILY, Disp: 90 tablet, Rfl: 3  •  famotidine (PEPCID) 20 MG tablet, Take 1 tablet by mouth 2 (Two) Times a Day., Disp: 60 tablet, Rfl: 3  •  galcanezumab-gnlm (EMGALITY) 120 MG/ML auto-injector pen, Inject 1 mL under the skin into the appropriate area as directed Every 28 (Twenty-Eight) Days., Disp: 1 mL, Rfl: 11  •  ibandronate (BONIVA) 150 MG tablet, ibandronate 150 mg tablet  Take 1 tablet every month by oral route for 90 days., Disp: , Rfl:   •  levocetirizine (XYZAL) 5 MG tablet, TAKE ONE TABLET BY MOUTH  EVERY EVENING, Disp: 30 tablet, Rfl: 4  •  levothyroxine (SYNTHROID, LEVOTHROID) 137 MCG tablet, Take 1 tablet by mouth Daily., Disp: 90 tablet, Rfl: 3  •  montelukast (SINGULAIR) 10 MG tablet, TAKE ONE TABLET BY MOUTH ONCE NIGHTLY, Disp: 30 tablet, Rfl: 5  •  pantoprazole (Protonix) 40 MG EC tablet, Take 1 tablet by mouth Daily., Disp: 90 tablet, Rfl: 1  •  rizatriptan (MAXALT) 10 MG tablet, Take 1 tablet by mouth 1 (One) Time for 1 dose. AT ONSET OF HEADACHE MAY REPEAT ONE TABLET IN 2 HOURS IF NEEDED, Disp: 12 tablet, Rfl: 5    Medicines reviewed by Susan Turcios, PharmD on 11/3/2022 at  1:56 PM    Drug Interactions  none     Adverse Drug Reactions  • Adverse Reactions Experienced: none   • Plan for ADR Management: not required    Hospitalizations and Urgent Care Since Last Assessment  • Hospitalizations or Admissions: none   • ED Visits: nond  • Urgent Office Visits: none     Adherence and Self-Administration  Adherence related patient's specialty therapy was discussed with the patient. The Adherence segment of this outreach has been reviewed and updated.     Medication Adherence    Adherence tools used: calendar  Support network for adherence: healthcare provider   Other support networks: Pharmacy         • Ongoing or New Barriers to Patient Adherence and/or Self-Administration: none   • Methods for Supporting Patient Adherence and/or Self-Administration: Patient adept with Emgality self-injections.     Goals of Therapy  Goals related to the patient's specialty therapy was discussed with the patient. The Patient Goals segment of this outreach has been reviewed and updated.    Goals     • Specialty Pharmacy General Goal      Maintain adherence of greater than 80%.             Quality of Life Assessment   Quality of Life related to the patient's specialty therapy was discussed with the patient. The QOL segment of this outreach has been reviewed and updated.     Quality of Life Assessment  Quality of Life  Improvement Scale: A good deal better    Reassessment Plan & Follow-Up  1. Medication Therapy Changes: none  2. Additional Plans, Therapy Recommendations, or Therapy Problems to Be Addressed: none  3. Pharmacist to perform regular reassessments no more than (6) months from the previous assessment.  4. Care Coordinator to set up future refill outreaches, coordinate prescription delivery, and escalate clinical questions to pharmacist.     Attestation  I attest that the specialty medication(s) addressed above are appropriate for the patient based on my reassessment.  If the prescribed therapy is at any point deemed not appropriate based on the current or future assessments, a consultation will be initiated with the patient's specialty care provider to determine the best course of action. The revised plan of therapy will be documented along with any additional patient education provided.     Susan Turcios, PharmD  11/3/2022  14:01 EDT

## 2022-11-04 ENCOUNTER — OFFICE VISIT (OUTPATIENT)
Dept: INTERNAL MEDICINE | Facility: CLINIC | Age: 57
End: 2022-11-04

## 2022-11-04 VITALS
RESPIRATION RATE: 21 BRPM | SYSTOLIC BLOOD PRESSURE: 112 MMHG | BODY MASS INDEX: 39.44 KG/M2 | WEIGHT: 237 LBS | HEART RATE: 88 BPM | DIASTOLIC BLOOD PRESSURE: 72 MMHG

## 2022-11-04 DIAGNOSIS — K21.9 GASTROESOPHAGEAL REFLUX DISEASE WITHOUT ESOPHAGITIS: Primary | ICD-10-CM

## 2022-11-04 PROCEDURE — 99213 OFFICE O/P EST LOW 20 MIN: CPT | Performed by: INTERNAL MEDICINE

## 2022-11-04 NOTE — PROGRESS NOTES
Chief Complaint  Heartburn    Subjective    Liliana Quevedo is a 57 y.o. female.     Liliana Quevedo presents to [facility] for follow-up for acid reflux.      History of Present Illness     1. Acid reflux - The patient reports  she was given Protonix and that she is doing better. She states she is having less symptoms.      The following portions of the patient's history were reviewed and updated as appropriate: allergies, current medications, past family history, past medical history, past social history, past surgical history and problem list.    Review of Systems   A review of systems was performed, and the pertinent positives are noted in the HPI.      Objective   Vital Signs:   /72   Pulse 88   Resp 21   Wt 108 kg (237 lb)   BMI 39.44 kg/m²     Body mass index is 39.44 kg/m².    Physical Exam  Vitals and nursing note reviewed.   Constitutional:       Appearance: Normal appearance. She is well-developed.      Comments: Patient is alert x3, non distressed.     HENT:      Head: Normocephalic and atraumatic.      Comments: head is normocephalic, atraumatic.       Right Ear: Tympanic membrane normal.      Left Ear: Tympanic membrane normal.      Nose: No rhinorrhea.      Mouth/Throat:      Pharynx: No posterior oropharyngeal erythema.      Comments: Moist membranes  Eyes:      Pupils: Pupils are equal, round, and reactive to light.      Comments: Pupils equal to light accommodation. Extraocular muscles intact.    Cardiovascular:      Rate and Rhythm: Normal rate and regular rhythm.      Pulses: Normal pulses.      Heart sounds: Normal heart sounds. No murmur heard.     Comments: S1, S2, no murmurs, rubs, or gallops.  Pulmonary:      Effort: Pulmonary effort is normal.      Breath sounds: Normal breath sounds.      Comments: Chest clear to auscultation, no rhonchi, wheeze.    Abdominal:      General: Bowel sounds are normal. There is no distension.       Palpations: Abdomen is soft.   Musculoskeletal:         General: No tenderness.      Cervical back: Normal range of motion and neck supple.   Skin:     Capillary Refill: Capillary refill takes less than 2 seconds.   Neurological:      Mental Status: She is alert and oriented to person, place, and time.   Psychiatric:         Mood and Affect: Mood normal.         Behavior: Behavior normal.               Assessment and Plan  Diagnoses and all orders for this visit:    1. GERD.  - Patient is doing very well the start of the PPI therapy and so I would like to continue her on this therapy for at least 3 months to facilitate any underlying erosions, gastritis, inflammation and to make sure that her symptoms are improving. I will see her back in 3 months and we will reassess how her GERD treatment is doing then.      Follow Up   No follow-ups on file.  Patient was given instructions and counseling regarding her condition or for health maintenance advice. Please see specific information pulled into the AVS if appropriate.     Transcribed from ambient dictation for Geremias Ramesh MD by Astrid Pinzon.  11/04/22   13:10 EDT    Patient or patient representative verbalized consent to the visit recording.  I have personally performed the services described in this document as transcribed by the above individual, and it is both accurate and complete.

## 2022-11-07 RX ORDER — RIZATRIPTAN BENZOATE 10 MG/1
10 TABLET ORAL ONCE
Qty: 12 TABLET | Refills: 5 | Status: SHIPPED | OUTPATIENT
Start: 2022-11-07 | End: 2023-02-16

## 2022-11-10 ENCOUNTER — SPECIALTY PHARMACY (OUTPATIENT)
Dept: ONCOLOGY | Facility: HOSPITAL | Age: 57
End: 2022-11-10

## 2022-11-10 NOTE — PROGRESS NOTES
Specialty Pharmacy Refill Coordination Note     Liliana is a 57 y.o. female contacted today regarding refills of Emgality specialty medication(s). Patient's last injection of Emgality was given on 10/22/2022. Patient's next injection of Emgality due 11/19/2022.    Reviewed and verified with patient: Yes  Specialty medication(s) and dose(s) confirmed: yes    Refill Questions    Flowsheet Row Most Recent Value   Changes to allergies? No   Changes to medications? No   New conditions since last clinic visit No   Unplanned office visit, urgent care, ED, or hospital admission in the last 4 weeks  No   How does patient/caregiver feel medication is working? Excellent   Financial problems or insurance changes  No   If yes, describe changes in insurance or financial issues. N/A   Since the previous refill, were any specialty medication doses or scheduled injections missed or delayed?  No   If yes, please provide the amount N/A   Why were doses missed? N/A   Does this patient require a clinical escalation to a pharmacist? No          Delivery Questions    Flowsheet Row Most Recent Value   Delivery method FedEx   Delivery address correct? Yes   Preferred delivery time? Anytime   Number of medications in delivery 1   Medication being filled and delivered Emgality   Doses left of specialty medications None. Once monthly injection.   Is there any medication that is due not being filled? No   Supplies needed? No supplies needed   Cooler needed? Yes   Do any medications need mixed or dated? No   Copay form of payment Credit card on file   Questions or concerns for the pharmacist? No   Are any medications first time fills? No            Medication Adherence    Adherence tools used: calendar  Support network for adherence: healthcare provider   Other support network: Pharmacy           Follow-up:  28 days     Rayne John, Pharmacy Technician  Specialty Pharmacy Technician

## 2022-12-08 ENCOUNTER — SPECIALTY PHARMACY (OUTPATIENT)
Dept: ONCOLOGY | Facility: HOSPITAL | Age: 57
End: 2022-12-08

## 2022-12-08 NOTE — PROGRESS NOTES
Specialty Pharmacy Refill Coordination Note     Liliana is a 57 y.o. female contacted today regarding refills of  Emgality specialty medication(s). Patient is due for injection on 1217.      Reviewed and verified with patient:       Specialty medication(s) and dose(s) confirmed: yes    Refill Questions    Flowsheet Row Most Recent Value   Changes to allergies? No   Changes to medications? No   New conditions since last clinic visit No   Unplanned office visit, urgent care, ED, or hospital admission in the last 4 weeks  No  [Patient had a scheduled foot surgery]   How does patient/caregiver feel medication is working? Very good   Financial problems or insurance changes  No   If yes, describe changes in insurance or financial issues. N/A   Since the previous refill, were any specialty medication doses or scheduled injections missed or delayed?  No   If yes, please provide the amount N/A   Why were doses missed? N/A   Does this patient require a clinical escalation to a pharmacist? No          Delivery Questions    Flowsheet Row Most Recent Value   Delivery method FedEx   Delivery address correct? Yes   Preferred delivery time? Anytime   Number of medications in delivery 1   Medication being filled and delivered Emgality   Doses left of specialty medications 0   Is there any medication that is due not being filled? No   Supplies needed? No supplies needed   Cooler needed? Yes   Do any medications need mixed or dated? No   Copay form of payment Payment plan already set up   Questions or concerns for the pharmacist? No   Are any medications first time fills? No            Medication Adherence    Adherence tools used: calendar  Support network for adherence: healthcare provider   Other support network: Pharmacy           Follow-up: 28 day(s)     Michelle Barbosa, Pharmacy Technician  Specialty Pharmacy Technician

## 2022-12-22 ENCOUNTER — TRANSCRIBE ORDERS (OUTPATIENT)
Dept: ADMINISTRATIVE | Facility: HOSPITAL | Age: 57
End: 2022-12-22

## 2022-12-22 DIAGNOSIS — Z12.31 VISIT FOR SCREENING MAMMOGRAM: Primary | ICD-10-CM

## 2023-01-02 DIAGNOSIS — K21.9 GASTROESOPHAGEAL REFLUX DISEASE WITHOUT ESOPHAGITIS: ICD-10-CM

## 2023-01-03 RX ORDER — FAMOTIDINE 20 MG/1
TABLET, FILM COATED ORAL
Qty: 60 TABLET | Refills: 3 | Status: SHIPPED | OUTPATIENT
Start: 2023-01-03

## 2023-01-09 DIAGNOSIS — J30.2 SEASONAL ALLERGIES: ICD-10-CM

## 2023-01-09 RX ORDER — LEVOCETIRIZINE DIHYDROCHLORIDE 5 MG/1
TABLET, FILM COATED ORAL
Qty: 30 TABLET | Refills: 4 | Status: SHIPPED | OUTPATIENT
Start: 2023-01-09

## 2023-01-11 ENCOUNTER — SPECIALTY PHARMACY (OUTPATIENT)
Dept: ONCOLOGY | Facility: HOSPITAL | Age: 58
End: 2023-01-11
Payer: COMMERCIAL

## 2023-01-11 NOTE — PROGRESS NOTES
Specialty Pharmacy Refill Coordination Note     Liliana is a 57 y.o. female contacted today regarding refills of  Emgality specialty medication(s). Patient is due for injection on 1/14.      Reviewed and verified with patient:       Specialty medication(s) and dose(s) confirmed: yes    Refill Questions    Flowsheet Row Most Recent Value   Changes to allergies? No   Changes to medications? No   New conditions since last clinic visit No   Unplanned office visit, urgent care, ED, or hospital admission in the last 4 weeks  No   How does patient/caregiver feel medication is working? Very good   Financial problems or insurance changes  No   If yes, describe changes in insurance or financial issues. N/A   Since the previous refill, were any specialty medication doses or scheduled injections missed or delayed?  No   If yes, please provide the amount N/A   Why were doses missed? N/A   Does this patient require a clinical escalation to a pharmacist? No          Delivery Questions    Flowsheet Row Most Recent Value   Delivery method FedEx   Delivery address correct? Yes   Preferred delivery time? Anytime   Number of medications in delivery 1   Medication being filled and delivered Emgality   Doses left of specialty medications 0   Is there any medication that is due not being filled? No   Supplies needed? No supplies needed   Cooler needed? Yes   Do any medications need mixed or dated? No   Copay form of payment Payment plan already set up   Questions or concerns for the pharmacist? No   Are any medications first time fills? No            Medication Adherence    Adherence tools used: calendar  Support network for adherence: healthcare provider   Other support network: Pharmacy           Follow-up: 28 day(s)     Michelle Barbosa, Pharmacy Technician  Specialty Pharmacy Technician

## 2023-01-25 ENCOUNTER — HOSPITAL ENCOUNTER (OUTPATIENT)
Dept: MAMMOGRAPHY | Facility: HOSPITAL | Age: 58
Discharge: HOME | End: 2023-01-25
Admitting: OBSTETRICS & GYNECOLOGY
Payer: COMMERCIAL

## 2023-01-25 DIAGNOSIS — Z12.31 VISIT FOR SCREENING MAMMOGRAM: ICD-10-CM

## 2023-01-25 PROCEDURE — 77063 BREAST TOMOSYNTHESIS BI: CPT | Performed by: RADIOLOGY

## 2023-01-25 PROCEDURE — 77067 SCR MAMMO BI INCL CAD: CPT

## 2023-01-25 PROCEDURE — 77067 SCR MAMMO BI INCL CAD: CPT | Performed by: RADIOLOGY

## 2023-01-25 PROCEDURE — 77063 BREAST TOMOSYNTHESIS BI: CPT

## 2023-02-02 ENCOUNTER — SPECIALTY PHARMACY (OUTPATIENT)
Dept: ONCOLOGY | Facility: HOSPITAL | Age: 58
End: 2023-02-02
Payer: COMMERCIAL

## 2023-02-02 NOTE — PROGRESS NOTES
Specialty Pharmacy Refill Coordination Note     Liliana is a 57 y.o. female contacted today regarding refills of  Emgality specialty medication(s). Patient is due for injection on 2/12.      Reviewed and verified with patient:       Specialty medication(s) and dose(s) confirmed: yes    Refill Questions    Flowsheet Row Most Recent Value   Changes to allergies? No   Changes to medications? No   New conditions since last clinic visit No   Unplanned office visit, urgent care, ED, or hospital admission in the last 4 weeks  No   How does patient/caregiver feel medication is working? Very good   Financial problems or insurance changes  No   If yes, describe changes in insurance or financial issues. N/A   Since the previous refill, were any specialty medication doses or scheduled injections missed or delayed?  No   If yes, please provide the amount N/A   Why were doses missed? N/A   Does this patient require a clinical escalation to a pharmacist? No          Delivery Questions    Flowsheet Row Most Recent Value   Delivery method FedEx   Delivery address correct? Yes   Preferred delivery time? Anytime   Number of medications in delivery 1   Medication being filled and delivered Emgality   Doses left of specialty medications 0   Is there any medication that is due not being filled? No   Supplies needed? No supplies needed   Cooler needed? Yes   Do any medications need mixed or dated? No   Copay form of payment Payment plan already set up   Questions or concerns for the pharmacist? No   Are any medications first time fills? No            Medication Adherence    Adherence tools used: calendar  Support network for adherence: healthcare provider   Other support network: Pharmacy           Follow-up: 28 day(s)     Michelle Barbosa, Pharmacy Technician  Specialty Pharmacy Technician

## 2023-02-16 ENCOUNTER — OFFICE VISIT (OUTPATIENT)
Dept: NEUROLOGY | Facility: CLINIC | Age: 58
End: 2023-02-16
Payer: COMMERCIAL

## 2023-02-16 VITALS
BODY MASS INDEX: 40.98 KG/M2 | HEIGHT: 65 IN | SYSTOLIC BLOOD PRESSURE: 116 MMHG | WEIGHT: 246 LBS | DIASTOLIC BLOOD PRESSURE: 76 MMHG | HEART RATE: 58 BPM | OXYGEN SATURATION: 98 %

## 2023-02-16 DIAGNOSIS — G43.C0 PERIODIC HEADACHE SYNDROME, NOT INTRACTABLE: Primary | Chronic | ICD-10-CM

## 2023-02-16 DIAGNOSIS — G47.33 OBSTRUCTIVE SLEEP APNEA SYNDROME: Chronic | ICD-10-CM

## 2023-02-16 PROCEDURE — 99214 OFFICE O/P EST MOD 30 MIN: CPT | Performed by: PSYCHIATRY & NEUROLOGY

## 2023-02-16 RX ORDER — GABAPENTIN 300 MG/1
CAPSULE ORAL
COMMUNITY
Start: 2023-01-06 | End: 2023-02-16

## 2023-02-16 RX ORDER — ONDANSETRON 4 MG/1
TABLET, FILM COATED ORAL
COMMUNITY
Start: 2022-12-02 | End: 2023-02-16

## 2023-02-16 NOTE — PROGRESS NOTES
"Chief Complaint  Sleep Apnea    Subjective        Liliana Quevedo presents to [facility] NEUROLOGY [facility]     History of Present Illness    57 y.o. female returns in follow up.  Last visit on 2/15/22 continued Emgality, Maxalt, CPAP mask and supplies.     Migraines     Dizziness over the weekend.  Episodes occur every three weeks.  Lasts for 3 minutes.  Intense sensation followed by a HA.     HA frequency is once every two months.  Last for few minutes.    Aborts with Maxalt.         Problem history:     MRI Brain 11/19/2020 normal, question of abnl in L yulisa is artifact  C U/S normal   Labs 10/5/2020 TSH, CBC, CMP, Vit D NCS   Echo - normal 12/9/2020    Pt with c/o dizziness and near syncope, CP.  Reports feeling hot, clammy, dizziness lasting for 25 - 30 minutes.  Sx can occur sitting or standing.  Holter 2017 unremarkable.      Sensation of feeling, drained, blurry vision and has to sit down.  Sitting down helps.  Assoc with HA.  Located in R forehead.  Sharp and throbbing.  HA always assoc with sx.  Frequency is daily.  Lasts for an hour.  Tylenol of some benefit.  Dizziness assoc with sx.       ABDULLAHI      11/2012.       Continues on CPAP 15 cm H20.     S/P Gastric sleeve 2/2017     Wt stable.      Compliant with CPAP and receiving benefit.  Improved sleep quality and daytime alertness.       Renew mask and supplies on scheduled basis.       DME:  [employer]     Objective   Vital Signs:  /76   Pulse 58   Ht 165.1 cm (65\")   Wt 112 kg (246 lb)   SpO2 98%   BMI 40.94 kg/m²   Estimated body mass index is 40.94 kg/m² as calculated from the following:    Height as of this encounter: 165.1 cm (65\").    Weight as of this encounter: 112 kg (246 lb).             Physical Exam  Eyes:      Extraocular Movements: EOM normal.      Pupils: Pupils are equal, round, and reactive to light.   Neurological:      Mental Status: She is oriented to person, place, and time.      Cranial Nerves: Cranial " nerves 2-12 are intact.      Motor: Motor strength is normal.      Gait: Gait is intact.   Psychiatric:         Speech: Speech normal.          Neurologic Exam     Mental Status   Oriented to person, place, and time.   Speech: speech is normal   Level of consciousness: alert  Knowledge: good and consistent with education.   Normal comprehension.     Cranial Nerves   Cranial nerves II through XII intact.     CN II   Visual fields full to confrontation.   Visual acuity: normal  Right visual field deficit: none  Left visual field deficit: none     CN III, IV, VI   Pupils are equal, round, and reactive to light.  Extraocular motions are normal.   Nystagmus: none   Diplopia: none  Ophthalmoparesis: none  Upgaze: normal  Downgaze: normal  Conjugate gaze: present    CN V   Facial sensation intact.   Right corneal reflex: normal  Left corneal reflex: normal    CN VII   Right facial weakness: none  Left facial weakness: none    CN VIII   Hearing: intact    CN IX, X   Palate: symmetric  Right gag reflex: normal  Left gag reflex: normal    CN XI   Right sternocleidomastoid strength: normal  Left sternocleidomastoid strength: normal    CN XII   Tongue: not atrophic  Fasciculations: absent  Tongue deviation: none    Motor Exam   Muscle bulk: normal  Overall muscle tone: normal    Strength   Strength 5/5 throughout.     Sensory Exam   Light touch normal.     Gait, Coordination, and Reflexes     Gait  Gait: normal    Tremor   Resting tremor: absent  Intention tremor: absent  Action tremor: absent    Reflexes   Reflexes 2+ except as noted.      Result Review :  The following data was reviewed by: Kurt Brand MD on 02/16/2023:                   Assessment and Plan   Diagnoses and all orders for this visit:    1. Periodic headache syndrome, not intractable (Primary)  Assessment & Plan:  Headaches are improving with treatment.  Continue current treatment regimen.       Emgality     Maxalt.         2. Obstructive sleep apnea  syndrome  Assessment & Plan:  Renew mask and supplies              Follow Up   No follow-ups on file.  Patient was given instructions and counseling regarding her condition or for health maintenance advice. Please see specific information pulled into the AVS if appropriate.

## 2023-02-16 NOTE — ASSESSMENT & PLAN NOTE
Headaches are improving with treatment.  Continue current treatment regimen.       Emgality     Maxalt.

## 2023-03-01 ENCOUNTER — TELEPHONE (OUTPATIENT)
Dept: INTERNAL MEDICINE | Facility: CLINIC | Age: 58
End: 2023-03-01
Payer: COMMERCIAL

## 2023-03-01 DIAGNOSIS — K21.9 GASTROESOPHAGEAL REFLUX DISEASE WITHOUT ESOPHAGITIS: ICD-10-CM

## 2023-03-01 RX ORDER — PANTOPRAZOLE SODIUM 40 MG/1
TABLET, DELAYED RELEASE ORAL
Qty: 90 TABLET | Refills: 2 | Status: SHIPPED | OUTPATIENT
Start: 2023-03-01

## 2023-03-01 NOTE — TELEPHONE ENCOUNTER
LOV for chronic condition 11/4/2022  NOV 4/13/2023    Dr Ramesh there is a contraindication for filling this medication   Do you want her on both pantoprazole and pepcid

## 2023-03-01 NOTE — TELEPHONE ENCOUNTER
Caller: Liliana Quevedo    Relationship: Self    Best call back number: [phone]    What is the best time to reach you: ANY TIME    Who are you requesting to speak with (clinical staff, provider,  specific staff member): WHOMEVER CALLED TODAY 3/1/23    What was the call regarding: PATIENT IS UNSURE    Do you require a callback: PLEASE ADVISE IF NECESSARY

## 2023-03-03 ENCOUNTER — SPECIALTY PHARMACY (OUTPATIENT)
Dept: ONCOLOGY | Facility: HOSPITAL | Age: 58
End: 2023-03-03
Payer: COMMERCIAL

## 2023-03-03 NOTE — PROGRESS NOTES
Specialty Pharmacy Refill Coordination Note     Liliana is a 57 y.o. female contacted today regarding refills of  Emgality specialty medication(s). Patient is due for injection on 3/12.      Reviewed and verified with patient:       Specialty medication(s) and dose(s) confirmed: yes    Refill Questions    Flowsheet Row Most Recent Value   Changes to allergies? No   Changes to medications? No   New conditions since last clinic visit No   Unplanned office visit, urgent care, ED, or hospital admission in the last 4 weeks  No   How does patient/caregiver feel medication is working? Very good   Financial problems or insurance changes  No   If yes, describe changes in insurance or financial issues. N/A   Since the previous refill, were any specialty medication doses or scheduled injections missed or delayed?  No   If yes, please provide the amount N/A   Why were doses missed? N/A   Does this patient require a clinical escalation to a pharmacist? No          Delivery Questions    Flowsheet Row Most Recent Value   Delivery method FedEx   Delivery address correct? Yes   Preferred delivery time? Anytime   Number of medications in delivery 1   Medication being filled and delivered Emgality   Doses left of specialty medications 0   Is there any medication that is due not being filled? No   Supplies needed? No supplies needed   Cooler needed? Yes   Do any medications need mixed or dated? No   Copay form of payment Payment plan already set up   Questions or concerns for the pharmacist? No   Are any medications first time fills? No            Medication Adherence    Adherence tools used: calendar  Support network for adherence: healthcare provider   Other support network: Pharmacy           Follow-up: 28 day(s)     Michelle Barbosa, Pharmacy Technician  Specialty Pharmacy Technician

## 2023-04-01 DIAGNOSIS — J30.9 ALLERGIC RHINITIS: ICD-10-CM

## 2023-04-03 RX ORDER — MONTELUKAST SODIUM 10 MG/1
TABLET ORAL
Qty: 30 TABLET | Refills: 5 | Status: SHIPPED | OUTPATIENT
Start: 2023-04-03

## 2023-04-06 ENCOUNTER — SPECIALTY PHARMACY (OUTPATIENT)
Dept: ONCOLOGY | Facility: HOSPITAL | Age: 58
End: 2023-04-06
Payer: COMMERCIAL

## 2023-04-06 NOTE — PROGRESS NOTES
"   Specialty Pharmacy Patient Management Program  Neurology Reassessment     Liliana Quevedo is a 57 y.o. female with migraines seen by a Neurology provider and enrolled in the Neurology Patient Management program offered by [facility].  A follow-up outreach was conducted, including assessment of continued therapy appropriateness, medication adherence, and side effect incidence and management for  Emgality.     Changes to Insurance Coverage or Financial Support  [employer], Emgality co-pay card     Relevant Past Medical History and Comorbidities  Relevant medical history and concomitant health conditions were discussed with the patient. The patient's chart has been reviewed for relevant past medical history and comorbid health conditions and updated as necessary.   Past Medical History:   Diagnosis Date   • Acute sinusitis     Assessed By: Geremias Ramesh (Internal Medicine); Last Assessed: 23 Jan 2015   • Allergic rhinitis    • Carpal tunnel syndrome    • Colon polyp     SIGMOID   • DDD (degenerative disc disease), lumbar    • De Quervain's tenosynovitis    • Depression    • Edema     Assessed By: Geremias Ramesh (Internal Medicine); Last Assessed: 26 Nov 2014   • Elevated alkaline phosphatase level     102   • Fatigue    • Gastric ulcer    • GERD (gastroesophageal reflux disease)     on Protonix.  Says sx's not bad even if misses meds, \"depends on what I eat\".  EGD GDW 6/16 40 cm, path DE reflux.  serum h. pyl neg   • Hematuria    • Hyperlipidemia    • Hypertension     per prim MD note   • Hypothyroidism     w/ hx thyromegaly s/p CASTILLO.    • Incomplete right bundle branch block    • Low back pain    • Migraine    • Mild cardiomegaly    • Morbid obesity    • Nephrolithiasis    • Osteoarthritis    • Peripheral edema    • Polyp of sigmoid colon    • Scoliosis    • Sleep apnea     CPAP compliant   • Tendinitis    • Viral URI     Assessed By: Geremias Ramesh (Internal Medicine); Last Assessed: 23 Jan " 2015   • Vitamin D deficiency    • Weight gain     Assessed By: Geremias Ramesh (Internal Medicine); Last Assessed: 26 Nov 2014     Social History     Socioeconomic History   • Marital status:    Tobacco Use   • Smoking status: Never     Passive exposure: Never   • Smokeless tobacco: Never   Vaping Use   • Vaping Use: Never used   Substance and Sexual Activity   • Alcohol use: No   • Drug use: No   • Sexual activity: Not Currently     Partners: Male     Birth control/protection: Hysterectomy       Problem list reviewed by Susan Turcios PharmD on 4/6/2023 at 12:05 PM    Allergies  Known allergies and reactions were discussed with the patient. The patient's chart has been reviewed for allergy information and updated as necessary.   Allergies   Allergen Reactions   • Ampicillin Hives   • Bactrim [Sulfamethoxazole-Trimethoprim] Hives and GI Intolerance   • Sulfa Antibiotics Hives     Hot and cold flashes, N&V, diarrhea         Allergies reviewed by Susan Turcios, PharmD on 4/6/2023 at 12:05 PM    Relevant Laboratory Values          Current Medication List  This medication list has been reviewed with the patient and evaluated for any interactions or necessary modifications/recommendations, and updated to include all prescription medications, OTC medications, and supplements the patient is currently taking.  This list reflects what is contained in the patient's profile, which has also been marked as reviewed to communicate to other providers it is the most up to date version of the patient's current medication therapy.     Current Outpatient Medications:   •  citalopram (CeleXA) 40 MG tablet, TAKE ONE TABLET BY MOUTH DAILY, Disp: 90 tablet, Rfl: 3  •  famotidine (PEPCID) 20 MG tablet, TAKE ONE TABLET BY MOUTH TWICE A DAY, Disp: 60 tablet, Rfl: 3  •  galcanezumab-gnlm (EMGALITY) 120 MG/ML auto-injector pen, Inject 1 mL under the skin into the appropriate area as directed Every 28 (Twenty-Eight) Days., Disp: 1 mL,  Rfl: 11  •  ibandronate (BONIVA) 150 MG tablet, ibandronate 150 mg tablet  Take 1 tablet every month by oral route for 90 days., Disp: , Rfl:   •  levocetirizine (XYZAL) 5 MG tablet, TAKE ONE TABLET BY MOUTH EVERY EVENING, Disp: 30 tablet, Rfl: 4  •  levothyroxine (SYNTHROID, LEVOTHROID) 137 MCG tablet, Take 1 tablet by mouth Daily., Disp: 90 tablet, Rfl: 3  •  montelukast (SINGULAIR) 10 MG tablet, TAKE ONE TABLET BY MOUTH ONCE NIGHTLY, Disp: 30 tablet, Rfl: 5  •  pantoprazole (PROTONIX) 40 MG EC tablet, TAKE ONE TABLET BY MOUTH DAILY, Disp: 90 tablet, Rfl: 2  •  rizatriptan (MAXALT) 10 MG tablet, Take 1 tablet by mouth 1 (One) Time for 1 dose. AT ONSET OF HEADACHE MAY REPEAT ONE TABLET IN 2 HOURS IF NEEDED, Disp: 12 tablet, Rfl: 5    Medicines reviewed by Susan Turcios, PharmD on 4/6/2023 at 12:05 PM    Drug Interactions  none     Adverse Drug Reactions  • Adverse Reactions Experienced: none   • Plan for ADR Management: not required    Hospitalizations and Urgent Care Since Last Assessment  • Hospitalizations or Admissions: none   • ED Visits: none  • Urgent Office Visits: none    Adherence and Self-Administration  Adherence related patient's specialty therapy was discussed with the patient. The Adherence segment of this outreach has been reviewed and updated.     Medication Adherence    Adherence tools used: calendar  Support network for adherence: healthcare provider   Other support networks: Pharmacy         • Ongoing or New Barriers to Patient Adherence and/or Self-Administration: none   • Methods for Supporting Patient Adherence and/or Self-Administration: patient adept with Emgality self-injections     Goals of Therapy  Goals related to the patient's specialty therapy was discussed with the patient. The Patient Goals segment of this outreach has been reviewed and updated.    Goals     • Specialty Pharmacy General Goal      Decrease intensity and frequency of migraines.  4/6/23 - Headaches improving with  current treatment - HA frequency is once every two months.  Last for few minutes.   Aborts with Maxalt.                 Quality of Life Assessment   Quality of Life related to the patient's specialty therapy was discussed with the patient. The QOL segment of this outreach has been reviewed and updated.     Quality of Life Assessment  Quality of Life Improvement Scale: Moderately better    Reassessment Plan & Follow-Up  1. Medication Therapy Changes: none  2. Additional Plans, Therapy Recommendations, or Therapy Problems to Be Addressed: none  3. Pharmacist to perform regular reassessments no more than (6) months from the previous assessment.  4. Care Coordinator to set up future refill outreaches, coordinate prescription delivery, and escalate clinical questions to pharmacist.     Attestation  I attest that the specialty medication(s) addressed above are appropriate for the patient based on my reassessment.  If the prescribed therapy is at any point deemed not appropriate based on the current or future assessments, a consultation will be initiated with the patient's specialty care provider to determine the best course of action. The revised plan of therapy will be documented along with any additional patient education provided.     Susan Turcios, PharmD  4/6/2023  12:07 EDT

## 2023-04-06 NOTE — PROGRESS NOTES
Specialty Pharmacy Refill Coordination Note     Liliana is a 57 y.o. female contacted today regarding refills of  Emgality specialty medication(s).    Next Emgality dose due on 4/9/23.    Reviewed and verified with patient:  Allergies  Meds  Problems       Specialty medication(s) and dose(s) confirmed: yes    Refill Questions    Flowsheet Row Most Recent Value   Changes to allergies? No   Changes to medications? No   New conditions since last clinic visit No   Unplanned office visit, urgent care, ED, or hospital admission in the last 4 weeks  No   How does patient/caregiver feel medication is working? Very good   Financial problems or insurance changes  No   If yes, describe changes in insurance or financial issues. N/A   Since the previous refill, were any specialty medication doses or scheduled injections missed or delayed?  No   If yes, please provide the amount N/A   Why were doses missed? N/A   Does this patient require a clinical escalation to a pharmacist? No          Delivery Questions    Flowsheet Row Most Recent Value   Delivery method FedEx   Delivery address correct? Yes   Preferred delivery time? Anytime   Number of medications in delivery 1   Medication being filled and delivered Emgality   Doses left of specialty medications 0   Is there any medication that is due not being filled? No   Supplies needed? No supplies needed   Cooler needed? Yes   Do any medications need mixed or dated? No   Copay form of payment Payment plan already set up   Questions or concerns for the pharmacist? No   Are any medications first time fills? No            Medication Adherence    Adherence tools used: calendar  Support network for adherence: healthcare provider   Other support network: Pharmacy           Follow-up: 28 day(s)     Susan Turcios, PharmD  4/6/2023

## 2023-04-13 ENCOUNTER — OFFICE VISIT (OUTPATIENT)
Dept: INTERNAL MEDICINE | Facility: CLINIC | Age: 58
End: 2023-04-13
Payer: COMMERCIAL

## 2023-04-13 VITALS
HEART RATE: 66 BPM | WEIGHT: 242 LBS | RESPIRATION RATE: 20 BRPM | DIASTOLIC BLOOD PRESSURE: 78 MMHG | SYSTOLIC BLOOD PRESSURE: 110 MMHG | BODY MASS INDEX: 40.27 KG/M2 | TEMPERATURE: 97.5 F

## 2023-04-13 DIAGNOSIS — M25.562 CHRONIC PAIN OF LEFT KNEE: ICD-10-CM

## 2023-04-13 DIAGNOSIS — K21.9 GASTROESOPHAGEAL REFLUX DISEASE WITHOUT ESOPHAGITIS: Primary | ICD-10-CM

## 2023-04-13 DIAGNOSIS — G89.29 CHRONIC PAIN OF LEFT KNEE: ICD-10-CM

## 2023-04-13 PROCEDURE — 99213 OFFICE O/P EST LOW 20 MIN: CPT | Performed by: INTERNAL MEDICINE

## 2023-04-13 NOTE — PROGRESS NOTES
Chief Complaint  Gastroesophageal reflux disease without esophagitis (3 month follow up)    Subjective    Liliana Quevedo is a 57 y.o. female.     Liliana Quevedo presents to [facility] for gastroesophageal reflux disease and heartburn.      History of Present Illness     1. Gastroesophageal reflux disease- The patient reports that she is doing well with medication changes and dietary changes. She is dependent on her medication. She denies any issues.    2. Left knee pain- Approximately 6 months ago, the patient had an x-ray of her left knee. She was experiencing issues with her left knee, but she was unaware of the cause. On Easter 04/09/2023, while she was cooking, her left knee buckled twice. When she is frequently on her feet, she experiences significant pain. She applies ice to the area and takes Tylenol. She reports that it is not necessary for her to take something daily. She intermittently experiences pain when she sleeps at night. She has a history of one prior foot surgery.      The following portions of the patient's history were reviewed and updated as appropriate: allergies, current medications, past family history, past medical history, past social history, past surgical history and problem list.    Review of Systems   A review of systems was performed, and pertinent findings are noted in the HPI.    Objective   Vital Signs:   /78 (BP Location: Right arm, Patient Position: Sitting, Cuff Size: Adult)   Pulse 66   Temp 97.5 °F (36.4 °C) (Temporal)   Resp 20   Wt 110 kg (242 lb)   BMI 40.27 kg/m²     Body mass index is 40.27 kg/m².  Class 3 Severe Obesity (BMI >=40). Obesity-related health conditions include the following: GERD. Obesity is improving with lifestyle modifications. BMI is is above average; BMI management plan is completed. We discussed portion control and increasing exercise.     Physical Exam  HENT:      Head: Normocephalic and  atraumatic.   Eyes:      Extraocular Movements: Extraocular movements intact.      Pupils: Pupils are equal, round, and reactive to light.   Musculoskeletal:      Comments: Left knee follow-up, patient has a reasonable range of motion in her left knee.   Neurological:      Mental Status: She is alert and oriented to person, place, and time.           Assessment and Plan  Diagnoses and all orders for this visit:      1. Chronic left knee pain.  - X-ray does show mild to moderate osteoarthritic changes.    2. Gastroesophageal reflux disease.  - She is doing very well on the current anti-reflux medication.   - We will continue her on that as well, which is Protonix 40 mg 1 tablet by mouth once a day and Pepcid 20 mg by mouth once a day.   - Both these medications have helped her with her gastroesophageal reflux disease.  - So, we will continue those as medical management.     3. Follow up.  - Otherwise, everything else looks good here.   - We will see the patient on the next scheduled follow-up or as needed.    Follow Up   No follow-ups on file.  Patient was given instructions and counseling regarding her condition or for health maintenance advice. Please see specific information pulled into the AVS if appropriate.     Transcribed from ambient dictation for Geremias Ramesh MD by Linn Fournier.  04/13/23   17:34 EDT    Patient or patient representative verbalized consent to the visit recording.  I have personally performed the services described in this document as transcribed by the above individual, and it is both accurate and complete.

## 2023-04-18 ENCOUNTER — OFFICE VISIT (OUTPATIENT)
Dept: INTERNAL MEDICINE | Facility: CLINIC | Age: 58
End: 2023-04-18
Payer: COMMERCIAL

## 2023-04-18 VITALS
DIASTOLIC BLOOD PRESSURE: 70 MMHG | HEART RATE: 80 BPM | OXYGEN SATURATION: 97 % | SYSTOLIC BLOOD PRESSURE: 110 MMHG | WEIGHT: 233 LBS | RESPIRATION RATE: 20 BRPM | BODY MASS INDEX: 38.77 KG/M2 | TEMPERATURE: 97.8 F

## 2023-04-18 DIAGNOSIS — R10.84 GENERALIZED ABDOMINAL PAIN: ICD-10-CM

## 2023-04-18 DIAGNOSIS — R31.9 HEMATURIA, UNSPECIFIED TYPE: ICD-10-CM

## 2023-04-18 DIAGNOSIS — A08.4 VIRAL GASTROENTERITIS: Primary | ICD-10-CM

## 2023-04-18 DIAGNOSIS — R82.998 LEUKOCYTES IN URINE: ICD-10-CM

## 2023-04-18 LAB
BILIRUB BLD-MCNC: ABNORMAL MG/DL
BILIRUB UR QL STRIP: NEGATIVE
CLARITY UR: CLEAR
CLARITY, POC: ABNORMAL
COLOR UR: ABNORMAL
COLOR UR: YELLOW
GLUCOSE UR STRIP-MCNC: NEGATIVE MG/DL
GLUCOSE UR STRIP-MCNC: NEGATIVE MG/DL
HGB UR QL STRIP.AUTO: ABNORMAL
KETONES UR QL STRIP: ABNORMAL
KETONES UR QL: ABNORMAL
LEUKOCYTE EST, POC: ABNORMAL
LEUKOCYTE ESTERASE UR QL STRIP.AUTO: ABNORMAL
NITRITE UR QL STRIP: NEGATIVE
NITRITE UR-MCNC: NEGATIVE MG/ML
PH UR STRIP.AUTO: 6 [PH] (ref 5–8)
PH UR: 5 [PH] (ref 5–8)
PROT UR QL STRIP: ABNORMAL
PROT UR STRIP-MCNC: ABNORMAL MG/DL
RBC # UR STRIP: ABNORMAL /UL
SP GR UR STRIP: 1.02 (ref 1–1.03)
SP GR UR: 1.01 (ref 1–1.03)
UROBILINOGEN UR QL STRIP: ABNORMAL
UROBILINOGEN UR QL: NORMAL

## 2023-04-18 PROCEDURE — 81002 URINALYSIS NONAUTO W/O SCOPE: CPT | Performed by: STUDENT IN AN ORGANIZED HEALTH CARE EDUCATION/TRAINING PROGRAM

## 2023-04-18 PROCEDURE — 87088 URINE BACTERIA CULTURE: CPT | Performed by: STUDENT IN AN ORGANIZED HEALTH CARE EDUCATION/TRAINING PROGRAM

## 2023-04-18 PROCEDURE — 87086 URINE CULTURE/COLONY COUNT: CPT | Performed by: STUDENT IN AN ORGANIZED HEALTH CARE EDUCATION/TRAINING PROGRAM

## 2023-04-18 PROCEDURE — 87186 SC STD MICRODIL/AGAR DIL: CPT | Performed by: STUDENT IN AN ORGANIZED HEALTH CARE EDUCATION/TRAINING PROGRAM

## 2023-04-18 PROCEDURE — 81001 URINALYSIS AUTO W/SCOPE: CPT | Performed by: STUDENT IN AN ORGANIZED HEALTH CARE EDUCATION/TRAINING PROGRAM

## 2023-04-18 PROCEDURE — 99213 OFFICE O/P EST LOW 20 MIN: CPT | Performed by: STUDENT IN AN ORGANIZED HEALTH CARE EDUCATION/TRAINING PROGRAM

## 2023-04-18 RX ORDER — ONDANSETRON 8 MG/1
8 TABLET, ORALLY DISINTEGRATING ORAL EVERY 8 HOURS PRN
Qty: 30 TABLET | Refills: 0 | Status: SHIPPED | OUTPATIENT
Start: 2023-04-18

## 2023-04-18 RX ORDER — DIPHENOXYLATE HYDROCHLORIDE AND ATROPINE SULFATE 2.5; .025 MG/1; MG/1
1 TABLET ORAL 4 TIMES DAILY PRN
Qty: 30 TABLET | Refills: 0 | Status: SHIPPED | OUTPATIENT
Start: 2023-04-18

## 2023-04-18 NOTE — PROGRESS NOTES
Follow Up Office Visit      Date: 2023   Patient Name: Liliana Quevedo  : 1965   MRN: [id number]     Chief Complaint:    Chief Complaint   Patient presents with   • Diarrhea   • Vomiting       History of Present Illness: Liliana Quevedo is a 57 y.o. female who is here today for abdominal pain and diarrhea.    HPI    The patient consented to the use of PAULINA.     The patient presents to the clinic for an acute visit.     Diarrhea and vomiting.   Patient complains of diarrhea and vomiting which started last 2023. She tried Pepto-Bismol which provided relief. Other symptoms include abdominal pain, cramping, nausea, decreased appetite, fatigue, and middle back pain. Stools were watery and dark in color. No sticky bowel movements or gross blood. Vomiting resolved last 2023. Symptoms resolved yesterday and patient decided to go to work today. However, she had to leave work this morning due to diarrhea. She had 2 bowel movements this morning. She denies any fever, chills, rhinorrhea, sore throat, hematemesis, bilious vomiting, dysuria, hematochezia, or exposure to any sick individuals. Patient works at [employer], has not eaten anything different, has not eaten raw or undercooked meat, or drank from any unsanitary water supply.  She has a pet dog who has no illness or any issues. The only thing she had this morning was a citrus Sprite.     Healthcare maintenance.   Her current medications include Celexa, Pepcid, Emgality, Boniva, Xyzal, Synthroid, Singulair, Protonix, and Maxalt. Her last colonoscopy was on 2018 showing normal results and to be repeated in .     Family history.   Patient denies any family history of Crohn's disease, celiac disease, or other inflammatory bowel disease.     Subjective      Review of Systems:   Review of Systems   Document verbalized in HPI.     I have reviewed the patients family history, social history, past medical history, past  surgical history and have updated it as appropriate.     Medications:     Current Outpatient Medications:   •  citalopram (CeleXA) 40 MG tablet, TAKE ONE TABLET BY MOUTH DAILY, Disp: 90 tablet, Rfl: 3  •  famotidine (PEPCID) 20 MG tablet, TAKE ONE TABLET BY MOUTH TWICE A DAY, Disp: 60 tablet, Rfl: 3  •  galcanezumab-gnlm (EMGALITY) 120 MG/ML auto-injector pen, Inject 1 mL under the skin into the appropriate area as directed Every 28 (Twenty-Eight) Days., Disp: 1 mL, Rfl: 11  •  ibandronate (BONIVA) 150 MG tablet, ibandronate 150 mg tablet  Take 1 tablet every month by oral route for 90 days., Disp: , Rfl:   •  levocetirizine (XYZAL) 5 MG tablet, TAKE ONE TABLET BY MOUTH EVERY EVENING, Disp: 30 tablet, Rfl: 4  •  levothyroxine (SYNTHROID, LEVOTHROID) 137 MCG tablet, Take 1 tablet by mouth Daily., Disp: 90 tablet, Rfl: 3  •  montelukast (SINGULAIR) 10 MG tablet, TAKE ONE TABLET BY MOUTH ONCE NIGHTLY, Disp: 30 tablet, Rfl: 5  •  pantoprazole (PROTONIX) 40 MG EC tablet, TAKE ONE TABLET BY MOUTH DAILY, Disp: 90 tablet, Rfl: 2  •  diphenoxylate-atropine (Lomotil) 2.5-0.025 MG per tablet, Take 1 tablet by mouth 4 (Four) Times a Day As Needed for Diarrhea., Disp: 30 tablet, Rfl: 0  •  ondansetron ODT (ZOFRAN-ODT) 8 MG disintegrating tablet, Place 1 tablet on the tongue Every 8 (Eight) Hours As Needed for Nausea or Vomiting., Disp: 30 tablet, Rfl: 0  •  rizatriptan (MAXALT) 10 MG tablet, Take 1 tablet by mouth 1 (One) Time for 1 dose. AT ONSET OF HEADACHE MAY REPEAT ONE TABLET IN 2 HOURS IF NEEDED, Disp: 12 tablet, Rfl: 5    Allergies:   Allergies   Allergen Reactions   • Ampicillin Hives   • Bactrim [Sulfamethoxazole-Trimethoprim] Hives and GI Intolerance   • Sulfa Antibiotics Hives     Hot and cold flashes, N&V, diarrhea       Objective     Physical Exam: Please see above  Vital Signs:   Vitals:    04/18/23 0902   BP: 110/70   BP Location: Left arm   Patient Position: Sitting   Cuff Size: Adult   Pulse: 80   Resp: 20    Temp: 97.8 °F (36.6 °C)   TempSrc: Temporal   SpO2: 97%   Weight: 106 kg (233 lb)   PainSc:   2   PainLoc: Back     Body mass index is 38.77 kg/m².    Physical Exam  Vitals reviewed.   Constitutional:       General: She is not in acute distress.     Appearance: Normal appearance. She is obese. She is not ill-appearing or toxic-appearing.   HENT:      Head: Normocephalic and atraumatic.      Right Ear: External ear normal.      Left Ear: External ear normal.      Nose: Nose normal. No congestion.      Mouth/Throat:      Mouth: Mucous membranes are moist.      Pharynx: No oropharyngeal exudate or posterior oropharyngeal erythema.   Eyes:      General: No scleral icterus.     Extraocular Movements: Extraocular movements intact.      Pupils: Pupils are equal, round, and reactive to light.   Cardiovascular:      Rate and Rhythm: Normal rate and regular rhythm.      Pulses: Normal pulses.      Heart sounds: Normal heart sounds. No murmur heard.    No friction rub. No gallop.   Pulmonary:      Effort: Pulmonary effort is normal. No respiratory distress.      Breath sounds: Normal breath sounds. No wheezing, rhonchi or rales.   Abdominal:      General: Abdomen is flat. Bowel sounds are normal. There is no distension.      Palpations: Abdomen is soft. There is no mass.      Tenderness: There is no abdominal tenderness. There is no right CVA tenderness, left CVA tenderness, guarding or rebound.      Hernia: No hernia is present.   Musculoskeletal:         General: No swelling or tenderness. Normal range of motion.      Cervical back: Normal range of motion. No rigidity.   Lymphadenopathy:      Cervical: No cervical adenopathy.   Skin:     General: Skin is warm and dry.      Capillary Refill: Capillary refill takes less than 2 seconds.      Findings: No erythema or rash.   Neurological:      General: No focal deficit present.      Mental Status: She is alert and oriented to person, place, and time.   Psychiatric:          Mood and Affect: Mood normal.         Behavior: Behavior normal.         Judgment: Judgment normal.         Procedures    Results:   Labs:   Hemoglobin A1C   Date Value Ref Range Status   02/20/2017 5.40 4.80 - 5.60 % Final     TSH   Date Value Ref Range Status   07/01/2022 1.240 0.270 - 4.200 uIU/mL Final        Imaging:   No valid procedures specified.     Assessment / Plan      Assessment/Plan:   Problem List Items Addressed This Visit    None  Visit Diagnoses     Viral gastroenteritis    -  Primary    Relevant Medications    ondansetron ODT (ZOFRAN-ODT) 8 MG disintegrating tablet    diphenoxylate-atropine (Lomotil) 2.5-0.025 MG per tablet    Generalized abdominal pain        Relevant Orders    POC Urinalysis Dipstick (Completed)    Leukocytes in urine        Relevant Orders    Urine Culture - Urine, Urine, Clean Catch    Hematuria, unspecified type        Relevant Orders    Urinalysis With Microscopic - Urine, Clean Catch          Viral gastroenteritis.   Will start patient on ondansetron 8 mg tablet as needed for nausea and vomiting.   She will be started on Lomotil tablet for diarrhea.   Advised patient to increase water intake and stay hydrated. Avoid sugar beverages as this can worsen diarrhea following acute gastroenteritis.  Discussed red flag symptoms and indications to seek emergency care. Patient voiced understanding.  Follow-up if symptoms worsen or fail to improve.     Generalized abdominal pain.   Point of care urinalysis was done today.     Leukocytes in urine.   Will send urine for culture.     Follow Up:   Return if symptoms worsen or fail to improve.    Simon Morgan MD  [facility] – [place], [place] TATI Brooks    Transcribed from ambient dictation for Simon Morgan MD by Imtiaz Martinez.  04/18/23   10:51 EDT    Patient or patient representative verbalized consent to the visit recording.  I have personally performed the services described in this document as transcribed by the above individual, and it is  both accurate and complete.

## 2023-04-19 LAB
BACTERIA UR QL AUTO: ABNORMAL /HPF
HYALINE CASTS UR QL AUTO: ABNORMAL /LPF
MUCOUS THREADS URNS QL MICRO: ABNORMAL /HPF
RBC # UR STRIP: ABNORMAL /HPF
REF LAB TEST METHOD: ABNORMAL
SQUAMOUS #/AREA URNS HPF: ABNORMAL /HPF
TRANS CELLS #/AREA URNS HPF: ABNORMAL /HPF
WBC # UR STRIP: ABNORMAL /HPF

## 2023-04-20 LAB — BACTERIA SPEC AEROBE CULT: ABNORMAL

## 2023-04-21 ENCOUNTER — TELEPHONE (OUTPATIENT)
Dept: INTERNAL MEDICINE | Facility: CLINIC | Age: 58
End: 2023-04-21
Payer: COMMERCIAL

## 2023-04-21 NOTE — TELEPHONE ENCOUNTER
Caller: Liliana Quevedo    Relationship: Self    Best call back number: [phone]    Caller requesting test results: PATIENT    What test was performed: URINALYSIS    When was the test performed: 04/18/23    Additional notes: PATIENT IS STILL HAVING BACK PAIN AND DARK URINE.    PLEASE ADVISE

## 2023-04-22 NOTE — TELEPHONE ENCOUNTER
Urine cultures are still pending.    I did see Dr. Morgan note where she was treated for gastroenteritis/diarrhea    Is patient getting better in regards to urinary symptoms or abdominal symptoms GI?    If no clinical improvement she may need to be seen in the evaluated.    Let me know

## 2023-04-24 NOTE — TELEPHONE ENCOUNTER
Patient has been notified and verb good understanding. Patient has been placed on schedule to be seen again.

## 2023-04-26 ENCOUNTER — OFFICE VISIT (OUTPATIENT)
Dept: INTERNAL MEDICINE | Facility: CLINIC | Age: 58
End: 2023-04-26
Payer: COMMERCIAL

## 2023-04-26 VITALS
RESPIRATION RATE: 20 BRPM | DIASTOLIC BLOOD PRESSURE: 70 MMHG | SYSTOLIC BLOOD PRESSURE: 110 MMHG | BODY MASS INDEX: 39.77 KG/M2 | TEMPERATURE: 97.7 F | WEIGHT: 239 LBS | HEART RATE: 57 BPM

## 2023-04-26 DIAGNOSIS — N12 PYELONEPHRITIS OF LEFT KIDNEY: Primary | ICD-10-CM

## 2023-04-26 PROCEDURE — 99214 OFFICE O/P EST MOD 30 MIN: CPT | Performed by: INTERNAL MEDICINE

## 2023-04-26 RX ORDER — CEFDINIR 300 MG/1
300 CAPSULE ORAL 2 TIMES DAILY
Qty: 20 CAPSULE | Refills: 0 | Status: SHIPPED | OUTPATIENT
Start: 2023-04-26

## 2023-05-01 DIAGNOSIS — K21.9 GASTROESOPHAGEAL REFLUX DISEASE WITHOUT ESOPHAGITIS: ICD-10-CM

## 2023-05-01 RX ORDER — FAMOTIDINE 20 MG/1
TABLET, FILM COATED ORAL
Qty: 60 TABLET | Refills: 3 | Status: SHIPPED | OUTPATIENT
Start: 2023-05-01

## 2023-05-08 ENCOUNTER — SPECIALTY PHARMACY (OUTPATIENT)
Dept: ONCOLOGY | Facility: HOSPITAL | Age: 58
End: 2023-05-08
Payer: COMMERCIAL

## 2023-05-08 NOTE — PROGRESS NOTES
Specialty Pharmacy Refill Coordination Note     Liliana is a 57 y.o. female contacted today regarding refills of Emgality specialty medication(s). Patient's last injection of Emgality was given on 4/9/2023. Patient's next injection of Emgality due 5/9/2023. Patient will not take until 5/10/2023    Reviewed and verified with patient: Yes  Specialty medication(s) and dose(s) confirmed: yes    Refill Questions    Flowsheet Row Most Recent Value   Changes to allergies? No   Changes to medications? No   New conditions since last clinic visit No   Unplanned office visit, urgent care, ED, or hospital admission in the last 4 weeks  No   How does patient/caregiver feel medication is working? Very good   Financial problems or insurance changes  No   If yes, describe changes in insurance or financial issues. N/A   Since the previous refill, were any specialty medication doses or scheduled injections missed or delayed?  No   If yes, please provide the amount N/A   Why were doses missed? N/A   Does this patient require a clinical escalation to a pharmacist? No          Delivery Questions    Flowsheet Row Most Recent Value   Delivery method FedEx   Delivery address correct? Yes   Preferred delivery time? Anytime   Number of medications in delivery 1   Medication being filled and delivered Emgality   Doses left of specialty medications None. Once monthly injection.   Is there any medication that is due not being filled? No   Supplies needed? No supplies needed   Cooler needed? Yes   Do any medications need mixed or dated? No   Copay form of payment Credit card on file   Questions or concerns for the pharmacist? No   Are any medications first time fills? No            Medication Adherence    Adherence tools used: calendar  Support network for adherence: healthcare provider   Other support network: Pharmacy           Follow-up:  28 days     Rayne John, Pharmacy Technician  Specialty Pharmacy Technician

## 2023-05-18 ENCOUNTER — OFFICE VISIT (OUTPATIENT)
Dept: INTERNAL MEDICINE | Facility: CLINIC | Age: 58
End: 2023-05-18
Payer: COMMERCIAL

## 2023-05-18 VITALS
RESPIRATION RATE: 20 BRPM | SYSTOLIC BLOOD PRESSURE: 110 MMHG | HEART RATE: 69 BPM | BODY MASS INDEX: 39.46 KG/M2 | DIASTOLIC BLOOD PRESSURE: 60 MMHG | WEIGHT: 237.13 LBS | TEMPERATURE: 97.5 F

## 2023-05-18 DIAGNOSIS — R82.998 LEUKOCYTES IN URINE: ICD-10-CM

## 2023-05-18 DIAGNOSIS — N12 PYELONEPHRITIS OF LEFT KIDNEY: Primary | ICD-10-CM

## 2023-05-18 DIAGNOSIS — R31.9 HEMATURIA, UNSPECIFIED TYPE: ICD-10-CM

## 2023-05-18 LAB
BILIRUB BLD-MCNC: NEGATIVE MG/DL
CLARITY, POC: ABNORMAL
COLOR UR: ABNORMAL
EXPIRATION DATE: ABNORMAL
GLUCOSE UR STRIP-MCNC: NEGATIVE MG/DL
KETONES UR QL: NEGATIVE
LEUKOCYTE EST, POC: ABNORMAL
Lab: ABNORMAL
NITRITE UR-MCNC: NEGATIVE MG/ML
PH UR: 5 [PH] (ref 5–8)
PROT UR STRIP-MCNC: NEGATIVE MG/DL
RBC # UR STRIP: ABNORMAL /UL
SP GR UR: 1.02 (ref 1–1.03)
UROBILINOGEN UR QL: NORMAL

## 2023-05-18 PROCEDURE — 99213 OFFICE O/P EST LOW 20 MIN: CPT | Performed by: INTERNAL MEDICINE

## 2023-05-18 PROCEDURE — 81003 URINALYSIS AUTO W/O SCOPE: CPT | Performed by: INTERNAL MEDICINE

## 2023-05-18 PROCEDURE — 81015 MICROSCOPIC EXAM OF URINE: CPT | Performed by: INTERNAL MEDICINE

## 2023-05-18 PROCEDURE — 87086 URINE CULTURE/COLONY COUNT: CPT | Performed by: INTERNAL MEDICINE

## 2023-05-18 RX ORDER — CEFDINIR 300 MG/1
300 CAPSULE ORAL 2 TIMES DAILY
Qty: 20 CAPSULE | Refills: 0 | Status: SHIPPED | OUTPATIENT
Start: 2023-05-18

## 2023-05-19 LAB
BACTERIA UR QL AUTO: ABNORMAL /HPF
HYALINE CASTS UR QL AUTO: ABNORMAL /LPF
RBC # UR STRIP: ABNORMAL /HPF
REF LAB TEST METHOD: ABNORMAL
SQUAMOUS #/AREA URNS HPF: ABNORMAL /HPF
TRANS CELLS #/AREA URNS HPF: ABNORMAL /HPF
WBC # UR STRIP: ABNORMAL /HPF

## 2023-05-19 NOTE — PROGRESS NOTES
Chief Complaint  Follow-up (3 week)    Subjective    Liliana Quevedo is a 57 y.o. female.     Liliana Quevedo presents to [facility] for a 3-week follow-up.    History of Present Illness     1. Left pyelonephritis- She was previously seen for left pyelonephritis and was given Omnicef 300 mg for 10 days. She is improved but still has tenderness in the left costovertebral area. She denies any dysuria.     The following portions of the patient's history were reviewed and updated as appropriate: allergies, current medications, past family history, past medical history, past social history, past surgical history and problem list.    Review of Systems:  A review of systems was performed, and pertinent findings are noted in the HPI.    Objective   Vital Signs:   /60 (BP Location: Right arm, Patient Position: Sitting, Cuff Size: Adult)   Pulse 69   Temp 97.5 °F (36.4 °C) (Temporal)   Resp 20   Wt 108 kg (237 lb 2 oz)   BMI 39.46 kg/m²     Body mass index is 39.46 kg/m².     Urinalysis positive for leukocytes and hematuria.     Physical Exam          Assessment and Plan  Diagnoses and all orders for this visit:    1. Left pyelonephritis.  - After review of history and physical and the evaluation of urinalysis, there are concerns for leukocytes and blood in the urine. Clinically, she has made significant improvement with mild residual soreness in the left costovertebral area. My recommendation is to continue on Omnicef 300 mg 1 tablet by mouth twice daily for another 10 days with emphasis on treating residual pyelonephritis to make sure that there is no concerns of an abscess within the kidney that has not resolved. Also, a review of the urine culture did result 25,000 or greater E. coli, which was found to be susceptible to the cephalosporins. I will see her back in 2 weeks for reevaluation of her urine to make sure treatment of cure has been documented and to  make sure there is no residual symptoms. If she does have blood or leukocytes or any persistent tenderness in the costovertebral area, an ultrasound would be appropriate for further evaluation of renal anatomy.    Follow Up   Return in about 2 weeks (around 6/1/2023).  Patient was given instructions and counseling regarding her condition or for health maintenance advice. Please see specific information pulled into the AVS if appropriate.       Transcribed from ambient dictation for Geremias Ramesh MD by Savanna Rosas.  05/18/23   20:15 EDT    Patient or patient representative verbalized consent to the visit recording.  I have personally performed the services described in this document as transcribed by the above individual, and it is both accurate and complete.

## 2023-05-20 LAB — BACTERIA SPEC AEROBE CULT: NO GROWTH

## 2023-05-24 ENCOUNTER — TELEPHONE (OUTPATIENT)
Dept: INTERNAL MEDICINE | Facility: CLINIC | Age: 58
End: 2023-05-24
Payer: COMMERCIAL

## 2023-06-01 ENCOUNTER — OFFICE VISIT (OUTPATIENT)
Dept: INTERNAL MEDICINE | Facility: CLINIC | Age: 58
End: 2023-06-01

## 2023-06-01 VITALS
DIASTOLIC BLOOD PRESSURE: 74 MMHG | SYSTOLIC BLOOD PRESSURE: 118 MMHG | BODY MASS INDEX: 39.49 KG/M2 | WEIGHT: 237 LBS | TEMPERATURE: 99.1 F | RESPIRATION RATE: 16 BRPM | HEIGHT: 65 IN | HEART RATE: 58 BPM

## 2023-06-01 DIAGNOSIS — R82.998 LEUKOCYTES IN URINE: ICD-10-CM

## 2023-06-01 DIAGNOSIS — R31.9 HEMATURIA, UNSPECIFIED TYPE: ICD-10-CM

## 2023-06-01 DIAGNOSIS — N12 PYELONEPHRITIS OF LEFT KIDNEY: Primary | ICD-10-CM

## 2023-06-01 LAB
BACTERIA UR QL AUTO: ABNORMAL /HPF
BILIRUB BLD-MCNC: NEGATIVE MG/DL
CLARITY, POC: ABNORMAL
COLOR UR: YELLOW
EXPIRATION DATE: ABNORMAL
GLUCOSE UR STRIP-MCNC: NEGATIVE MG/DL
HYALINE CASTS UR QL AUTO: ABNORMAL /LPF
KETONES UR QL: NEGATIVE
LEUKOCYTE EST, POC: ABNORMAL
Lab: ABNORMAL
NITRITE UR-MCNC: NEGATIVE MG/ML
PH UR: 6 [PH] (ref 5–8)
PROT UR STRIP-MCNC: NEGATIVE MG/DL
RBC # UR STRIP: ABNORMAL /HPF
RBC # UR STRIP: ABNORMAL /UL
REF LAB TEST METHOD: ABNORMAL
SP GR UR: 1.01 (ref 1–1.03)
SQUAMOUS #/AREA URNS HPF: ABNORMAL /HPF
UROBILINOGEN UR QL: NORMAL
WBC # UR STRIP: ABNORMAL /HPF

## 2023-06-01 PROCEDURE — 81015 MICROSCOPIC EXAM OF URINE: CPT | Performed by: INTERNAL MEDICINE

## 2023-06-01 PROCEDURE — 81003 URINALYSIS AUTO W/O SCOPE: CPT | Performed by: INTERNAL MEDICINE

## 2023-06-01 PROCEDURE — 99213 OFFICE O/P EST LOW 20 MIN: CPT | Performed by: INTERNAL MEDICINE

## 2023-06-01 PROCEDURE — 87086 URINE CULTURE/COLONY COUNT: CPT | Performed by: INTERNAL MEDICINE

## 2023-06-01 NOTE — PROGRESS NOTES
"Chief Complaint  Urinary Tract Infection (Follow up.. UA needed??)    Subjective    Liliana Quevedo is a 57 y.o. female.     Liliana Quevedo presents to [facility] for a urinary tract follow-up.    History of Present Illness  1. Urinary tract infection - The patient was seen on 05/18/2023 with concerns of back pain, urinary urgency, frequency, and possibly left pyelonephritis. She was adequately treated. Urinalysis showed 25,000 or greater E. coli and was found to be susceptible to the cephalosporins class. She was instructed to come back here in 2 weeks as a follow-up to see how she has done post antibiotic treatment. She feels fine and denies any urinary symptoms. Her back pain is not constant. She denies any fever, chills, nausea, vomiting, diarrhea, dizziness, or lightheadedness. Her urinary symptoms feel fine.     The following portions of the patient's history were reviewed and updated as appropriate: allergies, current medications, past family history, past medical history, past social history, past surgical history, and problem list.    Review of Systems:  A review of systems was performed, and pertinent findings are noted in the HPI.    Objective   Vital Signs:   /74   Pulse 58   Temp 99.1 °F (37.3 °C) (Infrared)   Resp 16   Ht 165.1 cm (65\")   Wt 108 kg (237 lb)   BMI 39.44 kg/m²     Body mass index is 39.44 kg/m².    Physical Exam  Vitals and nursing note reviewed.   Constitutional:       Appearance: Normal appearance.   HENT:      Head: Normocephalic and atraumatic.   Musculoskeletal:         General: Normal range of motion.   Skin:     General: Skin is warm.      Capillary Refill: Capillary refill takes less than 2 seconds.   Neurological:      Mental Status: She is alert.   Psychiatric:         Mood and Affect: Mood normal.         Behavior: Behavior normal.         Thought Content: Thought content normal.         Judgment: Judgment normal. "             Assessment and Plan  Diagnoses and all orders for this visit:    Diagnoses and all orders for this visit:    1. Pyelonephritis of left kidney (Primary)  -     POC Urinalysis Dipstick, Automated    2. Hematuria, unspecified type  -     Urine Culture - Urine, Urine, Clean Catch  -     Urinalysis, Microscopic Only - Urine, Clean Catch    3. Leukocytes in urine  -     Urine Culture - Urine, Urine, Clean Catch  -     Urinalysis, Microscopic Only - Urine, Clean Catch            Follow Up   No follow-ups on file.  Patient was given instructions and counseling regarding her condition or for health maintenance advice. Please see specific information pulled into the AVS if appropriate.       Transcribed from ambient dictation for Geremias Ramesh MD by Pascale Diaz.  06/01/23   18:52 EDT    Patient or patient representative verbalized consent to the visit recording.  I have personally performed the services described in this document as transcribed by the above individual, and it is both accurate and complete.

## 2023-06-02 ENCOUNTER — SPECIALTY PHARMACY (OUTPATIENT)
Dept: ONCOLOGY | Facility: HOSPITAL | Age: 58
End: 2023-06-02

## 2023-06-02 NOTE — PROGRESS NOTES
Specialty Pharmacy Refill Coordination Note     Liliana is a 57 y.o. female contacted today regarding refills of  Emgality specialty medication(s). Patient is due for injection on 6/6.      Reviewed and verified with patient:       Specialty medication(s) and dose(s) confirmed: yes    Refill Questions    Flowsheet Row Most Recent Value   Changes to allergies? No   Changes to medications? No   New conditions since last clinic visit No   Unplanned office visit, urgent care, ED, or hospital admission in the last 4 weeks  No   How does patient/caregiver feel medication is working? Very good   Financial problems or insurance changes  No   If yes, describe changes in insurance or financial issues. N/A   Since the previous refill, were any specialty medication doses or scheduled injections missed or delayed?  No   If yes, please provide the amount N/A   Why were doses missed? N/A   Does this patient require a clinical escalation to a pharmacist? No          Delivery Questions    Flowsheet Row Most Recent Value   Delivery method FedEx   Delivery address correct? Yes   Preferred delivery time? Anytime   Number of medications in delivery 1   Medication being filled and delivered Emgality   Doses left of specialty medications 0   Is there any medication that is due not being filled? No   Supplies needed? No supplies needed   Cooler needed? Yes   Do any medications need mixed or dated? No   Copay form of payment Credit card on file   Questions or concerns for the pharmacist? No   Are any medications first time fills? No            Medication Adherence    Adherence tools used: calendar  Support network for adherence: healthcare provider   Other support network: Pharmacy           Follow-up: 28 day(s)     Michelle Barbosa, Pharmacy Technician  Specialty Pharmacy Technician

## 2023-06-03 LAB — BACTERIA SPEC AEROBE CULT: NO GROWTH

## 2023-06-08 DIAGNOSIS — J30.2 SEASONAL ALLERGIES: ICD-10-CM

## 2023-06-08 RX ORDER — LEVOCETIRIZINE DIHYDROCHLORIDE 5 MG/1
TABLET, FILM COATED ORAL
Qty: 30 TABLET | Refills: 4 | Status: SHIPPED | OUTPATIENT
Start: 2023-06-08

## 2023-07-25 ENCOUNTER — SPECIALTY PHARMACY (OUTPATIENT)
Dept: ONCOLOGY | Facility: HOSPITAL | Age: 58
End: 2023-07-25
Payer: COMMERCIAL

## 2023-07-25 NOTE — PROGRESS NOTES
Specialty Pharmacy Refill Coordination Note     Liliana is a 57 y.o. female contacted today regarding refills of  Emgality specialty medication(s). Patient is due for injection on 8/1.      Reviewed and verified with patient:       Specialty medication(s) and dose(s) confirmed: yes    Refill Questions      Flowsheet Row Most Recent Value   Changes to allergies? No   Changes to medications? No   New conditions since last clinic visit No   Unplanned office visit, urgent care, ED, or hospital admission in the last 4 weeks  No   How does patient/caregiver feel medication is working? Very good   Financial problems or insurance changes  No   If yes, describe changes in insurance or financial issues. N/A   Since the previous refill, were any specialty medication doses or scheduled injections missed or delayed?  No   If yes, please provide the amount N/A   Why were doses missed? N/A   Does this patient require a clinical escalation to a pharmacist? No            Delivery Questions      Flowsheet Row Most Recent Value   Delivery method FedEx   Delivery address correct? Yes   Preferred delivery time? Anytime   Number of medications in delivery 1   Medication being filled and delivered Emgality   Doses left of specialty medications 0   Is there any medication that is due not being filled? No   Supplies needed? No supplies needed   Cooler needed? Yes   Do any medications need mixed or dated? No   Copay form of payment Payment plan already set up   Additional comments Emgality=$0   Questions or concerns for the pharmacist? No              Medication Adherence    Adherence tools used: calendar  Support network for adherence: healthcare provider   Other support network: Pharmacy             Follow-up: 28 day(s)     Michelle Barbosa, Pharmacy Technician  Specialty Pharmacy Technician

## 2023-08-24 ENCOUNTER — SPECIALTY PHARMACY (OUTPATIENT)
Dept: ONCOLOGY | Facility: HOSPITAL | Age: 58
End: 2023-08-24
Payer: COMMERCIAL

## 2023-08-24 NOTE — PROGRESS NOTES
Specialty Pharmacy Refill Coordination Note     Liliana is a 57 y.o. female contacted today regarding refills of  Emgality specialty medication(s). Patient is due for injection on 8/29.      Reviewed and verified with patient:       Specialty medication(s) and dose(s) confirmed: yes    Refill Questions      Flowsheet Row Most Recent Value   Changes to allergies? No   Changes to medications? No   New conditions since last clinic visit No   Unplanned office visit, urgent care, ED, or hospital admission in the last 4 weeks  No   How does patient/caregiver feel medication is working? Very good   Financial problems or insurance changes  No   If yes, describe changes in insurance or financial issues. N/A   Since the previous refill, were any specialty medication doses or scheduled injections missed or delayed?  No   If yes, please provide the amount N/A   Why were doses missed? N/A   Does this patient require a clinical escalation to a pharmacist? No            Delivery Questions      Flowsheet Row Most Recent Value   Delivery method FedEx   Delivery address correct? Yes   Preferred delivery time? Anytime   Number of medications in delivery 1   Medication being filled and delivered Emgality   Doses left of specialty medications 0   Is there any medication that is due not being filled? No   Supplies needed? No supplies needed   Cooler needed? Yes   Do any medications need mixed or dated? No   Copay form of payment Payment plan already set up   Additional comments Emgality=$0   Questions or concerns for the pharmacist? No   Are any medications first time fills? No              Medication Adherence    Adherence tools used: calendar  Support network for adherence: healthcare provider   Other support network: Pharmacy             Follow-up: 28 day(s)     Michelle Barbosa, Pharmacy Technician  Specialty Pharmacy Technician

## 2023-08-29 DIAGNOSIS — J30.9 ALLERGIC RHINITIS: ICD-10-CM

## 2023-08-29 RX ORDER — MONTELUKAST SODIUM 10 MG/1
TABLET ORAL
Qty: 90 TABLET | Refills: 0 | Status: SHIPPED | OUTPATIENT
Start: 2023-08-29

## 2023-09-06 ENCOUNTER — OFFICE VISIT (OUTPATIENT)
Dept: INTERNAL MEDICINE | Facility: CLINIC | Age: 58
End: 2023-09-06
Payer: COMMERCIAL

## 2023-09-06 ENCOUNTER — PATIENT MESSAGE (OUTPATIENT)
Dept: INTERNAL MEDICINE | Facility: CLINIC | Age: 58
End: 2023-09-06
Payer: COMMERCIAL

## 2023-09-06 VITALS
DIASTOLIC BLOOD PRESSURE: 64 MMHG | HEIGHT: 65 IN | RESPIRATION RATE: 18 BRPM | HEART RATE: 60 BPM | TEMPERATURE: 98.2 F | SYSTOLIC BLOOD PRESSURE: 108 MMHG | WEIGHT: 240 LBS | BODY MASS INDEX: 39.99 KG/M2

## 2023-09-06 DIAGNOSIS — R10.9 FLANK PAIN: Primary | ICD-10-CM

## 2023-09-06 LAB
BILIRUB BLD-MCNC: NEGATIVE MG/DL
CLARITY, POC: ABNORMAL
COLOR UR: YELLOW
EXPIRATION DATE: ABNORMAL
GLUCOSE UR STRIP-MCNC: NEGATIVE MG/DL
KETONES UR QL: NEGATIVE
LEUKOCYTE EST, POC: ABNORMAL
Lab: ABNORMAL
NITRITE UR-MCNC: NEGATIVE MG/ML
PH UR: 6.5 [PH] (ref 5–8)
PROT UR STRIP-MCNC: NEGATIVE MG/DL
RBC # UR STRIP: ABNORMAL /UL
SP GR UR: 1.01 (ref 1–1.03)
UROBILINOGEN UR QL: NORMAL

## 2023-09-06 PROCEDURE — 87086 URINE CULTURE/COLONY COUNT: CPT | Performed by: NURSE PRACTITIONER

## 2023-09-06 PROCEDURE — 99213 OFFICE O/P EST LOW 20 MIN: CPT | Performed by: NURSE PRACTITIONER

## 2023-09-06 PROCEDURE — 81003 URINALYSIS AUTO W/O SCOPE: CPT | Performed by: NURSE PRACTITIONER

## 2023-09-06 RX ORDER — NITROFURANTOIN 25; 75 MG/1; MG/1
100 CAPSULE ORAL 2 TIMES DAILY
Qty: 14 CAPSULE | Refills: 0 | Status: SHIPPED | OUTPATIENT
Start: 2023-09-06

## 2023-09-06 NOTE — PROGRESS NOTES
Chief Complaint  Back Pain (Left lower x1 week. )    Subjective          Liliana Quevedo presents to [facility]  Back Pain  The patient presents today with complaints of back pain and a urinary tract infection. She is a patient of Dr. Geremias Ramesh.    The patient's symptoms began approximately 1 week ago. At that time, she was experiencing low back pain, left greater than right, and a urinary tract infection. She denies a history of kidney stones. She denies fever, nausea, vomiting, and constipation. She denies dysuria, urine odor, cloudy urine, and hematuria. She has a history of urinary tract infections, and her current symptoms are similar. The patient mentions that she had a CT scan past and was informed that her left kidney is smaller than the right; she has a history of gallstones.    The patient is allergic to BACTRIM and AMOXICILLIN.    The patient has an appointment scheduled with Dr. Ramesh on Monday, 09/11/2023.        Current Outpatient Medications:     citalopram (CeleXA) 40 MG tablet, TAKE ONE TABLET BY MOUTH DAILY, Disp: 90 tablet, Rfl: 3    famotidine (PEPCID) 20 MG tablet, TAKE ONE TABLET BY MOUTH TWICE A DAY, Disp: 60 tablet, Rfl: 3    galcanezumab-gnlm (EMGALITY) 120 MG/ML auto-injector pen, Inject 1 mL under the skin into the appropriate area as directed Every 28 (Twenty-Eight) Days., Disp: 1 mL, Rfl: 11    ibandronate (BONIVA) 150 MG tablet, ibandronate 150 mg tablet  Take 1 tablet every month by oral route for 90 days., Disp: , Rfl:     levocetirizine (XYZAL) 5 MG tablet, TAKE ONE TABLET BY MOUTH EVERY EVENING, Disp: 30 tablet, Rfl: 4    levothyroxine (SYNTHROID, LEVOTHROID) 137 MCG tablet, Take 1 tablet by mouth Daily., Disp: 90 tablet, Rfl: 3    montelukast (SINGULAIR) 10 MG tablet, TAKE ONE TABLET BY MOUTH ONCE NIGHTLY, Disp: 90 tablet, Rfl: 0    pantoprazole (PROTONIX) 40 MG EC tablet, TAKE ONE TABLET BY MOUTH DAILY, Disp: 90 tablet, Rfl:  "2    nitrofurantoin, macrocrystal-monohydrate, (Macrobid) 100 MG capsule, Take 1 capsule by mouth 2 (Two) Times a Day., Disp: 14 capsule, Rfl: 0    rizatriptan (MAXALT) 10 MG tablet, Take 1 tablet by mouth 1 (One) Time for 1 dose. AT ONSET OF HEADACHE MAY REPEAT ONE TABLET IN 2 HOURS IF NEEDED, Disp: 12 tablet, Rfl: 5         Objective   Vital Signs:   /64 (BP Location: Right arm, Patient Position: Sitting, Cuff Size: Adult)   Pulse 60   Temp 98.2 °F (36.8 °C) (Infrared)   Resp 18   Ht 165.1 cm (65\")   Wt 109 kg (240 lb)   BMI 39.94 kg/m²     Physical Exam  Vitals and nursing note reviewed.   Constitutional:       General: She is not in acute distress.     Appearance: Normal appearance. She is well-developed. She is not ill-appearing.   HENT:      Head: Normocephalic and atraumatic.   Eyes:      General: No scleral icterus.  Neck:      Thyroid: No thyromegaly.   Cardiovascular:      Rate and Rhythm: Normal rate and regular rhythm.   Pulmonary:      Effort: Pulmonary effort is normal.      Breath sounds: Normal breath sounds.   Abdominal:      General: Bowel sounds are normal. There is no distension.      Palpations: Abdomen is soft.      Tenderness: There is no abdominal tenderness.   Musculoskeletal:      Comments: Flank pain.   Lymphadenopathy:      Cervical: No cervical adenopathy.   Skin:     Capillary Refill: Capillary refill takes 2 to 3 seconds.      Coloration: Skin is not pale.   Neurological:      Mental Status: She is alert and oriented to person, place, and time.   Psychiatric:         Mood and Affect: Mood normal.         Behavior: Behavior normal.      Result Review :                 Assessment and Plan    Diagnoses and all orders for this visit:    1. Flank pain (Primary)  -     POC Urinalysis Dipstick, Automated; Future  -     Urine Culture - Urine, Urine, Random Void; Future  -     Urine Culture - Urine, Urine, Random Void  -     POC Urinalysis Dipstick, Automated    Other orders  -     " nitrofurantoin, macrocrystal-monohydrate, (Macrobid) 100 MG capsule; Take 1 capsule by mouth 2 (Two) Times a Day.  Dispense: 14 capsule; Refill: 0    Flank pain  - Urinalysis is positive for leukocytes and blood.  - We will go ahead and start her on Macrobid.  - I advised the patient to come back and see Dr. Ramesh for PE        Follow Up   Return for Annual, Recheck Domitila.  Patient was given instructions and counseling regarding her condition or for health maintenance advice. Please see specific information pulled into the AVS if appropriate.     RTC/call  If symptoms worsen  Meds MOA and SE's reviewed and pt v/u    CASE Cabezas  [facility]  [address] [place] [phone]  Fax [phone]  Phone [phone]      Transcribed from ambient dictation for CASE Cabezas by Nunu Snowden.  09/06/23   18:23 EDT    Patient or patient representative verbalized consent to the visit recording.  I have personally performed the services described in this document as transcribed by the above individual, and it is both accurate and complete.

## 2023-09-07 LAB — BACTERIA SPEC AEROBE CULT: NORMAL

## 2023-09-11 ENCOUNTER — OFFICE VISIT (OUTPATIENT)
Dept: INTERNAL MEDICINE | Facility: CLINIC | Age: 58
End: 2023-09-11
Payer: COMMERCIAL

## 2023-09-11 VITALS
HEART RATE: 60 BPM | WEIGHT: 235.38 LBS | DIASTOLIC BLOOD PRESSURE: 70 MMHG | RESPIRATION RATE: 16 BRPM | TEMPERATURE: 97.7 F | SYSTOLIC BLOOD PRESSURE: 120 MMHG | BODY MASS INDEX: 41.71 KG/M2 | HEIGHT: 63 IN

## 2023-09-11 DIAGNOSIS — Z12.11 COLON CANCER SCREENING: ICD-10-CM

## 2023-09-11 DIAGNOSIS — R10.9 FLANK PAIN: ICD-10-CM

## 2023-09-11 DIAGNOSIS — E55.9 VITAMIN D DEFICIENCY: ICD-10-CM

## 2023-09-11 DIAGNOSIS — Z00.00 WELL ADULT EXAM: Primary | ICD-10-CM

## 2023-09-11 DIAGNOSIS — N12 PYELONEPHRITIS OF LEFT KIDNEY: ICD-10-CM

## 2023-09-11 LAB
ALBUMIN SERPL-MCNC: 4.4 G/DL (ref 3.5–5.2)
ALBUMIN/GLOB SERPL: 1.4 G/DL
ALP SERPL-CCNC: 92 U/L (ref 39–117)
ALT SERPL W P-5'-P-CCNC: 15 U/L (ref 1–33)
ANION GAP SERPL CALCULATED.3IONS-SCNC: 10.9 MMOL/L (ref 5–15)
AST SERPL-CCNC: 22 U/L (ref 1–32)
BILIRUB SERPL-MCNC: 0.4 MG/DL (ref 0–1.2)
BUN SERPL-MCNC: 13 MG/DL (ref 6–20)
BUN/CREAT SERPL: 13 (ref 7–25)
CALCIUM SPEC-SCNC: 9.9 MG/DL (ref 8.6–10.5)
CHLORIDE SERPL-SCNC: 102 MMOL/L (ref 98–107)
CHOLEST SERPL-MCNC: 202 MG/DL (ref 0–200)
CO2 SERPL-SCNC: 26.1 MMOL/L (ref 22–29)
CREAT SERPL-MCNC: 1 MG/DL (ref 0.57–1)
DEPRECATED RDW RBC AUTO: 42.5 FL (ref 37–54)
EGFRCR SERPLBLD CKD-EPI 2021: 65.8 ML/MIN/1.73
ERYTHROCYTE [DISTWIDTH] IN BLOOD BY AUTOMATED COUNT: 13.8 % (ref 12.3–15.4)
GLOBULIN UR ELPH-MCNC: 3.2 GM/DL
GLUCOSE SERPL-MCNC: 80 MG/DL (ref 65–99)
HCT VFR BLD AUTO: 43 % (ref 34–46.6)
HDLC SERPL-MCNC: 49 MG/DL (ref 40–60)
HGB BLD-MCNC: 14.3 G/DL (ref 12–15.9)
LDLC SERPL CALC-MCNC: 131 MG/DL (ref 0–100)
LDLC/HDLC SERPL: 2.63 {RATIO}
MCH RBC QN AUTO: 28.5 PG (ref 26.6–33)
MCHC RBC AUTO-ENTMCNC: 33.3 G/DL (ref 31.5–35.7)
MCV RBC AUTO: 85.7 FL (ref 79–97)
PLATELET # BLD AUTO: 234 10*3/MM3 (ref 140–450)
PMV BLD AUTO: 10.7 FL (ref 6–12)
POTASSIUM SERPL-SCNC: 4.6 MMOL/L (ref 3.5–5.2)
PROT SERPL-MCNC: 7.6 G/DL (ref 6–8.5)
RBC # BLD AUTO: 5.02 10*6/MM3 (ref 3.77–5.28)
SODIUM SERPL-SCNC: 139 MMOL/L (ref 136–145)
T4 FREE SERPL-MCNC: 1.42 NG/DL (ref 0.93–1.7)
TRIGL SERPL-MCNC: 120 MG/DL (ref 0–150)
TSH SERPL DL<=0.05 MIU/L-ACNC: 1.68 UIU/ML (ref 0.27–4.2)
VLDLC SERPL-MCNC: 22 MG/DL (ref 5–40)
WBC NRBC COR # BLD: 4.73 10*3/MM3 (ref 3.4–10.8)

## 2023-09-11 PROCEDURE — 99396 PREV VISIT EST AGE 40-64: CPT | Performed by: INTERNAL MEDICINE

## 2023-09-11 PROCEDURE — 80050 GENERAL HEALTH PANEL: CPT | Performed by: INTERNAL MEDICINE

## 2023-09-11 PROCEDURE — 80061 LIPID PANEL: CPT | Performed by: INTERNAL MEDICINE

## 2023-09-11 PROCEDURE — 36415 COLL VENOUS BLD VENIPUNCTURE: CPT | Performed by: INTERNAL MEDICINE

## 2023-09-11 PROCEDURE — 82306 VITAMIN D 25 HYDROXY: CPT | Performed by: INTERNAL MEDICINE

## 2023-09-11 PROCEDURE — 84439 ASSAY OF FREE THYROXINE: CPT | Performed by: INTERNAL MEDICINE

## 2023-09-11 NOTE — PROGRESS NOTES
"Chief Complaint  Annual Exam    Subjective    Liliana Quevedo is a 57 y.o. female.     Liliana Quevedo presents to [facility] for       History of Present Illness      1. Urinary tract infection.  The patient was seen on 09/07/2023 or 09/08/2023 and was told that she had a urinary tract infection. She is still experiencing pain on the left side of her abdomen. The pain occasionally radiates to the anterior aspect of her left side. She was seen by CASE Jacobo, last week. The pain improved, but then it returned. She was given antibiotic capsules. The pain has improved, but it is still painful, and some days are better than others. She denies any nausea or vomiting.    The following portions of the patient's history were reviewed and updated as appropriate: allergies, current medications, past family history, past medical history, past social history, past surgical history, and problem list.          Complete Adult Physical          Diet: regular         Exercise: Minimal to none        Social History: No EtOH consumption, no IV drug use, no marijuana, no illicit drugs.        Preventative Screenings  Mammogram- up to date    Pap smear- up to date with Gy[facility]    Colonoscopy: Up-to-date        Immunizations: Up-to-date         Review of Systems  A review of systems was performed, and the pertinent positives are noted in the HPI.      Objective   Vital Signs:   /70 (BP Location: Right arm, Patient Position: Sitting, Cuff Size: Adult)   Pulse 60   Temp 97.7 °F (36.5 °C) (Temporal)   Resp 16   Ht 160.2 cm (63.07\")   Wt 107 kg (235 lb 6 oz)   BMI 41.60 kg/m²     Body mass index is 41.6 kg/m².        Physical Exam  Constitutional:       Comments: Patient is alert x3, non-distressed.   HENT:      Head: Normocephalic and atraumatic.      Mouth/Throat:      Mouth: Mucous membranes are moist.   Neck:      Comments: No cervical lymphadenopathy. No " goiter.  Cardiovascular:      Heart sounds: S1 normal and S2 normal. No murmur heard.    No friction rub. No gallop.      Comments: Peripheral vascular: +2 pulses, warm extremity, good perfusion.  Pulmonary:      Breath sounds: Normal breath sounds. No wheezing or rhonchi.   Abdominal:      General: Bowel sounds are normal.      Palpations: Abdomen is soft. There is no mass.      Tenderness: There is no abdominal tenderness.   Musculoskeletal:      Comments: +5/5 both upper and lower distributions. On the patient's left costovertebral angle, she does have some tenderness and soreness to palpation. No rebound or peritoneal signs, but considerable noticeable tenderness or soreness to this particular area.             Assessment and Plan  Diagnoses and all orders for this visit:      1. Recurrent urinary tract infection versus pyelonephritis.  - Because of the recurrence and chronicity of her symptoms and also on physical exam today, I am going to get an ultrasound just to evaluate her kidney anatomy and that particular area for concerns of another urinary tract infection and we will move accordingly after we receive those results.    2. Anticipatory guidance.  - In regard to nutrition, age appropriate and with healthy, well-balanced diet.    3. Anticipatory guidance.  - She is due for a colon cancer screening; a colonoscopy has been ordered.  - Pap smear and mammogram are up to date as well and patient is up to date on her COVID-19 vaccines as well.  - Patient immunization wise needs to be updated with the pneumococcal or Prevnar 20, so we will go ahead and get her on that today.  - Patient is also due for labs, CBC, CMP, TSH, free T4, lipid profile and vitamin D level will also be done today as well.  - Otherwise, everything else looks good.  - We will order ultrasound.  - I will have the patient come back for a repeat urinalysis as a walk-in only to have that done and we will continue closely monitoring her urinary  symptoms.    Diagnoses and all orders for this visit:    1. Well adult exam (Primary)    2. Flank pain    3. Pyelonephritis of left kidney  -     CBC (No Diff); Future  -     Comprehensive Metabolic Panel; Future  -     T4, Free; Future  -     TSH; Future  -     Lipid Panel; Future  -     US Renal Bilateral; Future  -     CBC (No Diff)  -     Comprehensive Metabolic Panel  -     T4, Free  -     TSH  -     Lipid Panel    4. Colon cancer screening  -     Ambulatory Referral For Screening Colonoscopy    5. Vitamin D deficiency  -     Vitamin D 25 hydroxy; Future  -     Vitamin D 25 hydroxy          Follow Up   No follow-ups on file.  Patient was given instructions and counseling regarding her condition or for health maintenance advice. Please see specific information pulled into the AVS if appropriate.          Transcribed from ambient dictation for Geremias Ramesh MD by Li Good.  09/11/23   14:09 EDT    Patient or patient representative verbalized consent to the visit recording.  I have personally performed the services described in this document as transcribed by the above individual, and it is both accurate and complete.

## 2023-09-12 LAB — 25(OH)D3 SERPL-MCNC: 52.1 NG/ML (ref 30–100)

## 2023-09-26 ENCOUNTER — HOSPITAL ENCOUNTER (OUTPATIENT)
Dept: ULTRASOUND IMAGING | Facility: HOSPITAL | Age: 58
Discharge: HOME | End: 2023-09-26
Admitting: INTERNAL MEDICINE
Payer: COMMERCIAL

## 2023-09-26 ENCOUNTER — SPECIALTY PHARMACY (OUTPATIENT)
Dept: ONCOLOGY | Facility: HOSPITAL | Age: 58
End: 2023-09-26
Payer: COMMERCIAL

## 2023-09-26 DIAGNOSIS — N12 PYELONEPHRITIS OF LEFT KIDNEY: ICD-10-CM

## 2023-09-26 PROCEDURE — 76775 US EXAM ABDO BACK WALL LIM: CPT

## 2023-09-26 NOTE — PROGRESS NOTES
"   Specialty Pharmacy Patient Management Program  Neurology Reassessment     Liliana Quevedo is a 57 y.o. female with migraines seen by a Neurology provider and enrolled in the Neurology Patient Management program offered by [facility].  A follow-up outreach was conducted, including assessment of continued therapy appropriateness, medication adherence, and side effect incidence and management for Emgality.     Changes to Insurance Coverage or Financial Support  [employer], Emgality co-pay card.     Relevant Past Medical History and Comorbidities  Relevant medical history and concomitant health conditions were discussed with the patient. The patient's chart has been reviewed for relevant past medical history and comorbid health conditions and updated as necessary.   Past Medical History:   Diagnosis Date    Acute sinusitis     Assessed By: Geremias Ramesh (Internal Medicine); Last Assessed: 23 Jan 2015    Allergic rhinitis     Carpal tunnel syndrome     Colon polyp     SIGMOID    DDD (degenerative disc disease), lumbar     De Quervain's tenosynovitis     Depression     Edema     Assessed By: Geremias Ramesh (Internal Medicine); Last Assessed: 26 Nov 2014    Elevated alkaline phosphatase level     102    Fatigue     Gastric ulcer     GERD (gastroesophageal reflux disease)     on Protonix.  Says sx's not bad even if misses meds, \"depends on what I eat\".  EGD [facility] 6/16 40 cm, path DE reflux.  serum h. pyl neg    Hematuria     Hyperlipidemia     Hypertension     per prim MD note    Hypothyroidism     w/ hx thyromegaly s/p CASTILLO.     Incomplete right bundle branch block     Low back pain     Migraine     Mild cardiomegaly     Morbid obesity     Nephrolithiasis     Osteoarthritis     Peripheral edema     Polyp of sigmoid colon     Scoliosis     Sleep apnea     CPAP compliant    Tendinitis     Viral URI     Assessed By: Geremias Ramesh (Internal Medicine); Last Assessed: 23 Jan 2015    Vitamin D deficiency     " Weight gain     Assessed By: Geremias Ramesh (Internal Medicine); Last Assessed: 26 Nov 2014     Social History     Socioeconomic History    Marital status:    Tobacco Use    Smoking status: Never     Passive exposure: Never    Smokeless tobacco: Never   Vaping Use    Vaping Use: Never used   Substance and Sexual Activity    Alcohol use: No    Drug use: No    Sexual activity: Not Currently     Partners: Male     Birth control/protection: Hysterectomy     Problem list reviewed by Susan Turcios, PharmD on 9/26/2023 at  9:55 AM    Hospitalizations and Urgent Care Since Last Assessment  ED Visits, Admissions, or Hospitalizations: none   Urgent Office Visits: none     Allergies  Known allergies and reactions were discussed with the patient. The patient's chart has been reviewed for allergy information and updated as necessary.   Allergies   Allergen Reactions    Ampicillin Hives    Bactrim [Sulfamethoxazole-Trimethoprim] Hives and GI Intolerance    Sulfa Antibiotics Hives     Hot and cold flashes, N&V, diarrhea     Allergies reviewed by Susan Turcios, PharmD on 9/26/2023 at  9:55 AM    Relevant Laboratory Values  Common labs          9/11/2023    12:31   Common Labs   Glucose 80    BUN 13    Creatinine 1.00    Sodium 139    Potassium 4.6    Chloride 102    Calcium 9.9    Albumin 4.4    Total Bilirubin 0.4    Alkaline Phosphatase 92    AST (SGOT) 22    ALT (SGPT) 15    WBC 4.73    Hemoglobin 14.3    Hematocrit 43.0    Platelets 234    Total Cholesterol 202    Triglycerides 120    HDL Cholesterol 49    LDL Cholesterol  131        Current Medication List  This medication list has been reviewed with the patient and evaluated for any interactions or necessary modifications/recommendations, and updated to include all prescription medications, OTC medications, and supplements the patient is currently taking.  This list reflects what is contained in the patient's profile, which has also been marked as reviewed to  communicate to other providers it is the most up to date version of the patient's current medication therapy.     Current Outpatient Medications:     citalopram (CeleXA) 40 MG tablet, TAKE ONE TABLET BY MOUTH DAILY, Disp: 90 tablet, Rfl: 3    famotidine (PEPCID) 20 MG tablet, TAKE ONE TABLET BY MOUTH TWICE A DAY, Disp: 60 tablet, Rfl: 3    galcanezumab-gnlm (EMGALITY) 120 MG/ML auto-injector pen, Inject 1 mL under the skin into the appropriate area as directed Every 28 (Twenty-Eight) Days., Disp: 1 mL, Rfl: 11    ibandronate (BONIVA) 150 MG tablet, ibandronate 150 mg tablet  Take 1 tablet every month by oral route for 90 days., Disp: , Rfl:     levocetirizine (XYZAL) 5 MG tablet, TAKE ONE TABLET BY MOUTH EVERY EVENING, Disp: 30 tablet, Rfl: 4    levothyroxine (SYNTHROID, LEVOTHROID) 137 MCG tablet, Take 1 tablet by mouth Daily., Disp: 90 tablet, Rfl: 3    montelukast (SINGULAIR) 10 MG tablet, TAKE ONE TABLET BY MOUTH ONCE NIGHTLY, Disp: 90 tablet, Rfl: 0    nitrofurantoin, macrocrystal-monohydrate, (Macrobid) 100 MG capsule, Take 1 capsule by mouth 2 (Two) Times a Day., Disp: 14 capsule, Rfl: 0    pantoprazole (PROTONIX) 40 MG EC tablet, TAKE ONE TABLET BY MOUTH DAILY, Disp: 90 tablet, Rfl: 2    rizatriptan (MAXALT) 10 MG tablet, Take 1 tablet by mouth 1 (One) Time for 1 dose. AT ONSET OF HEADACHE MAY REPEAT ONE TABLET IN 2 HOURS IF NEEDED, Disp: 12 tablet, Rfl: 5    Medicines reviewed by Susan Turcios, PharmD on 9/26/2023 at  9:55 AM    Drug Interactions  none     Adverse Drug Reactions  Medication tolerability: Tolerating with no to minimal ADRs          Medication plan: Continue therapy with normal follow-up  Plan for ADR Management: not required      Adherence, Self-Administration, and Current Therapy Problems  Adherence related patient's specialty therapy was discussed with the patient. The Adherence segment of this outreach has been reviewed and updated.   Adherence Questions  Medication(s) assessed:  Emgality  On average, how many doses/injections does the patient miss per month?: 0  What are the identified reasons for non-adherence or missed doses? : no problems identfied  What is the estimated medication adherence level?: %  Based on the patient/caregiver response and refill history, does this patient require an MTP to track adherence improvements?: no    Additional Barriers to Patient Self-Administration: none  Methods for Supporting Patient Self-Administration: pt adept with Emgality self-injections.    Recently Close Medication Therapy Problems  No medication therapy recommendations to display  Open Medication Therapy Problems  No medication therapy recommendations to display     Goals of Therapy  Goals related to the patient's specialty therapy was discussed with the patient. The Patient Goals segment of this outreach has been reviewed and updated.    Goals Addressed Today        Specialty Pharmacy General Goal      Decrease intensity and frequency of migraines.  4/6/23 - Headaches improving with current treatment - HA frequency is once every two months.  Last for few minutes.   Aborts with Maxalt.                   Quality of Life Assessment   Quality of Life related to the patient's enrollment in the patient management program and services provided was discussed with the patient. The QOL segment of this outreach has been reviewed and updated.   Quality of Life Improvement Scale: Moderately better    Reassessment Plan & Follow-Up  Medication Therapy Changes: no changes, continue Emgality 120 mg SubQ monthly  Related Plans, Therapy Recommendations, or Issues to Be Addressed: none  Pharmacist to perform regular reassessments no more than (6) months from the previous assessment.  Care Coordinator to set up future refill outreaches, coordinate prescription delivery, and escalate clinical questions to pharmacist.     Attestation  Therapeutic appropriateness: Appropriate  I attest the patient was actively  involved in and has agreed to the above plan of care. If the prescribed therapy is at any point deemed not appropriate based on the current or future assessments, a consultation will be initiated with the patient's specialty care provider to determine the best course of action. The revised plan of therapy will be documented along with any additional patient education provided. Discussed aforementioned material with patient via telemedicine.    Susan Turcios, PharmD, [facility]  Clinic Specialty Pharmacist, Neurology  9/26/2023  09:57 EDT

## 2023-09-26 NOTE — PROGRESS NOTES
Specialty Pharmacy Refill Coordination Note     Liliana is a 57 y.o. female contacted today regarding refills of  Emgality specialty medication(s).    Next Emgality injection due on 9/27/23    Reviewed and verified with patient:  Allergies  Meds  Problems       Specialty medication(s) and dose(s) confirmed: yes    Refill Questions      Flowsheet Row Most Recent Value   Changes to allergies? No   Changes to medications? No   New conditions since last clinic visit No   Unplanned office visit, urgent care, ED, or hospital admission in the last 4 weeks  No   How does patient/caregiver feel medication is working? Very good   Financial problems or insurance changes  No   If yes, describe changes in insurance or financial issues. N/A   Since the previous refill, were any specialty medication doses or scheduled injections missed or delayed?  No   If yes, please provide the amount N/A   Why were doses missed? N/A   Does this patient require a clinical escalation to a pharmacist? No            Delivery Questions      Flowsheet Row Most Recent Value   Delivery method FedEx   Delivery address correct? Yes   Preferred delivery time? Anytime   Number of medications in delivery 1   Medication being filled and delivered Emgality   Doses left of specialty medications 0   Is there any medication that is due not being filled? No   Supplies needed? No supplies needed   Cooler needed? Yes   Do any medications need mixed or dated? No   Copay form of payment Payment plan already set up   Additional comments Emgality=$0   Questions or concerns for the pharmacist? No   Explain any questions or concerns for the pharmacist n/a   Are any medications first time fills? No              Medication Adherence    Adherence tools used: calendar  Support network for adherence: healthcare provider   Other support network: Pharmacy             Follow-up: 25 day(s)     Susan Turcios, MarianaD

## 2023-09-27 DIAGNOSIS — K21.9 GASTROESOPHAGEAL REFLUX DISEASE WITHOUT ESOPHAGITIS: ICD-10-CM

## 2023-09-27 RX ORDER — FAMOTIDINE 20 MG/1
TABLET, FILM COATED ORAL
Qty: 60 TABLET | Refills: 3 | Status: SHIPPED | OUTPATIENT
Start: 2023-09-27

## 2023-09-29 ENCOUNTER — TELEPHONE (OUTPATIENT)
Dept: INTERNAL MEDICINE | Facility: CLINIC | Age: 58
End: 2023-09-29
Payer: COMMERCIAL

## 2023-09-29 NOTE — TELEPHONE ENCOUNTER
Spoke with patient letting her know that Dr. Ramesh will be out of the office. She stated if she needs to schedule, she will schedule with another provider when he is out.

## 2023-09-29 NOTE — TELEPHONE ENCOUNTER
Unable to leave voice message due to mailbox not set up. Tried calling the alternate phone number, but no answer or option to leave voice message.    Hub ok to read:  Patient's kidney ultrasound was essentially normal. There were some parts that were hard to distinguish due to bowel gas patterns, but overall no evidence of a pyelonephritis

## 2023-09-29 NOTE — TELEPHONE ENCOUNTER
Patient's kidney ultrasound was essentially normal.  There were some parts that were hard to distinguish due to bowel gas patterns, but overall no evidence of a pyelonephritis

## 2023-10-04 ENCOUNTER — LAB (OUTPATIENT)
Dept: INTERNAL MEDICINE | Facility: CLINIC | Age: 58
End: 2023-10-04
Payer: COMMERCIAL

## 2023-10-04 DIAGNOSIS — R52 PAIN: Primary | ICD-10-CM

## 2023-10-04 LAB
BILIRUB BLD-MCNC: NEGATIVE MG/DL
CLARITY, POC: CLEAR
COLOR UR: YELLOW
EXPIRATION DATE: NORMAL
GLUCOSE UR STRIP-MCNC: NEGATIVE MG/DL
KETONES UR QL: NEGATIVE
LEUKOCYTE EST, POC: NEGATIVE
Lab: NORMAL
NITRITE UR-MCNC: NEGATIVE MG/ML
PH UR: 6 [PH] (ref 5–8)
PROT UR STRIP-MCNC: NEGATIVE MG/DL
RBC # UR STRIP: NEGATIVE /UL
SP GR UR: 1.01 (ref 1–1.03)
UROBILINOGEN UR QL: NORMAL

## 2023-10-04 PROCEDURE — 81003 URINALYSIS AUTO W/O SCOPE: CPT | Performed by: INTERNAL MEDICINE

## 2023-10-10 DIAGNOSIS — F33.0 MILD EPISODE OF RECURRENT MAJOR DEPRESSIVE DISORDER: ICD-10-CM

## 2023-10-10 RX ORDER — CITALOPRAM 40 MG/1
TABLET ORAL
Qty: 90 TABLET | Refills: 3 | Status: SHIPPED | OUTPATIENT
Start: 2023-10-10

## 2023-10-19 ENCOUNTER — SPECIALTY PHARMACY (OUTPATIENT)
Dept: ONCOLOGY | Facility: HOSPITAL | Age: 58
End: 2023-10-19
Payer: COMMERCIAL

## 2023-10-19 NOTE — PROGRESS NOTES
Specialty Pharmacy Refill Coordination Note     Liliana is a 58 y.o. female contacted today regarding refills of  Emgality specialty medication(s).. patient Injection is due on 10/27    Reviewed and verified with patient:       Specialty medication(s) and dose(s) confirmed: yes    Refill Questions      Flowsheet Row Most Recent Value   Changes to allergies? No   Changes to medications? No   New conditions since last clinic visit No   Unplanned office visit, urgent care, ED, or hospital admission in the last 4 weeks  No   How does patient/caregiver feel medication is working? Very good   Financial problems or insurance changes  No   If yes, describe changes in insurance or financial issues. N/A   Since the previous refill, were any specialty medication doses or scheduled injections missed or delayed?  No   If yes, please provide the amount N/A   Why were doses missed? N/A   Does this patient require a clinical escalation to a pharmacist? No            Delivery Questions      Flowsheet Row Most Recent Value   Delivery method FedEx   Delivery address correct? Yes   Delivery phone number mobile phone   Preferred delivery time? Anytime   Number of medications in delivery 1   Medication(s) being filled and delivered Galcanezumab-Gnlm   Doses left of specialty medications N/A   Is there any medication that is due not being filled? No   Supplies needed? No supplies needed   Cooler needed? Yes   Do any medications need mixed or dated? No   Copay form of payment Payment plan already set up   Additional comments N/A   Questions or concerns for the pharmacist? No   Explain any questions or concerns for the pharmacist N/A   Are any medications first time fills? No   Tracking number for delivery N/A              Medication Adherence          Adherence tools used: calendar      Support network for adherence: healthcare provider   Other support network: Pharmacy             Follow-up: 30 day(s)     Beulah Ortiz  Specialty  Pharmacy Technician

## 2023-10-20 ENCOUNTER — DOCUMENTATION (OUTPATIENT)
Dept: ONCOLOGY | Facility: HOSPITAL | Age: 58
End: 2023-10-20
Payer: COMMERCIAL

## 2023-11-01 DIAGNOSIS — J30.2 SEASONAL ALLERGIES: ICD-10-CM

## 2023-11-01 RX ORDER — LEVOCETIRIZINE DIHYDROCHLORIDE 5 MG/1
TABLET, FILM COATED ORAL
Qty: 90 TABLET | Refills: 1 | Status: SHIPPED | OUTPATIENT
Start: 2023-11-01

## 2023-11-14 ENCOUNTER — SPECIALTY PHARMACY (OUTPATIENT)
Dept: ONCOLOGY | Facility: HOSPITAL | Age: 58
End: 2023-11-14
Payer: COMMERCIAL

## 2023-11-14 NOTE — PROGRESS NOTES
Specialty Pharmacy Refill Coordination Note     Liliana is a 58 y.o. female contacted today regarding refills of  Emgality specialty medication(s).. patient Injection is due on 11/27    Reviewed and verified with patient:       Specialty medication(s) and dose(s) confirmed: yes    Refill Questions      Flowsheet Row Most Recent Value   Changes to allergies? No   Changes to medications? No   New conditions since last clinic visit No   Unplanned office visit, urgent care, ED, or hospital admission in the last 4 weeks  No   How does patient/caregiver feel medication is working? Very good   Financial problems or insurance changes  No   If yes, describe changes in insurance or financial issues. N/A   Since the previous refill, were any specialty medication doses or scheduled injections missed or delayed?  No   If yes, please provide the amount N/A   Why were doses missed? N/A   Does this patient require a clinical escalation to a pharmacist? No            Delivery Questions      Flowsheet Row Most Recent Value   Delivery method [employer]Ex   Delivery address correct? Yes   Delivery phone number mobile phone   Preferred delivery time? Anytime   Number of medications in delivery 1   Medication(s) being filled and delivered Galcanezumab-Gnlm   Doses left of specialty medications N/A   Is there any medication that is due not being filled? No   Supplies needed? No supplies needed   Cooler needed? Yes   Do any medications need mixed or dated? No   Copay form of payment Payment plan already set up   Additional comments N/A   Questions or concerns for the pharmacist? No   Explain any questions or concerns for the pharmacist N/A   Are any medications first time fills? No   Tracking number for delivery N/A              Medication Adherence          Adherence tools used: calendar      Support network for adherence: healthcare provider   Other support network: Pharmacy             Follow-up: 30 day(s)     Beulah Ortiz  Specialty  Pharmacy Technician

## 2023-11-24 DIAGNOSIS — K21.9 GASTROESOPHAGEAL REFLUX DISEASE WITHOUT ESOPHAGITIS: ICD-10-CM

## 2023-11-24 DIAGNOSIS — J30.9 ALLERGIC RHINITIS: ICD-10-CM

## 2023-11-27 RX ORDER — MONTELUKAST SODIUM 10 MG/1
TABLET ORAL
Qty: 90 TABLET | Refills: 1 | Status: SHIPPED | OUTPATIENT
Start: 2023-11-27

## 2023-11-27 RX ORDER — PANTOPRAZOLE SODIUM 40 MG/1
TABLET, DELAYED RELEASE ORAL
Qty: 90 TABLET | Refills: 2 | Status: SHIPPED | OUTPATIENT
Start: 2023-11-27

## 2023-12-19 RX ORDER — SODIUM, POTASSIUM,MAG SULFATES 17.5-3.13G
1 SOLUTION, RECONSTITUTED, ORAL ORAL TAKE AS DIRECTED
Qty: 354 ML | Refills: 0 | Status: SHIPPED | OUTPATIENT
Start: 2023-12-19

## 2023-12-28 ENCOUNTER — OUTSIDE FACILITY SERVICE (OUTPATIENT)
Dept: GASTROENTEROLOGY | Facility: CLINIC | Age: 58
End: 2023-12-28
Payer: COMMERCIAL

## 2023-12-28 PROCEDURE — 45378 DIAGNOSTIC COLONOSCOPY: CPT | Performed by: INTERNAL MEDICINE

## 2023-12-29 DIAGNOSIS — K21.9 GASTROESOPHAGEAL REFLUX DISEASE WITHOUT ESOPHAGITIS: ICD-10-CM

## 2023-12-29 RX ORDER — FAMOTIDINE 20 MG/1
20 TABLET, FILM COATED ORAL 2 TIMES DAILY
Qty: 180 TABLET | Refills: 0 | Status: SHIPPED | OUTPATIENT
Start: 2023-12-29

## 2024-01-11 ENCOUNTER — SPECIALTY PHARMACY (OUTPATIENT)
Dept: ONCOLOGY | Facility: HOSPITAL | Age: 59
End: 2024-01-11
Payer: COMMERCIAL

## 2024-01-11 NOTE — PROGRESS NOTES
Specialty Pharmacy Refill Coordination Note     Liliana is a 58 y.o. female contacted today regarding refills of Emgality specialty medication(s).. patient Injection is due on 01/25    Reviewed and verified with patient:       Specialty medication(s) and dose(s) confirmed: yes    Refill Questions      Flowsheet Row Most Recent Value   Changes to allergies? No   Changes to medications? No   New conditions or infections since last clinic visit No   Unplanned office visit, urgent care, ED, or hospital admission in the last 4 weeks  No   How does patient/caregiver feel medication is working? Very good   Financial problems or insurance changes  No   If yes, describe changes in insurance or financial issues. N/A   Since the previous refill, were any specialty medication doses or scheduled injections missed or delayed?  No   If yes, please provide the amount N/A   Why were doses missed? N/A   Does this patient require a clinical escalation to a pharmacist? No            Delivery Questions      Flowsheet Row Most Recent Value   Delivery method [employer]Ex   Delivery address verified with patient/caregiver? Yes   Delivery address Home   Number of medications in delivery 1   Medication(s) being filled and delivered Galcanezumab-Gnlm   Doses left of specialty medications N/A   Copay verified? Yes   Copay amount N/A   Copay form of payment No copayment ($0)              Medication Adherence          Adherence tools used: calendar      Support network for adherence: healthcare provider   Other support network: Pharmacy             Follow-up: 28 day(s)     Beulah Ortiz  Specialty Pharmacy Technician

## 2024-01-17 ENCOUNTER — TRANSCRIBE ORDERS (OUTPATIENT)
Dept: ADMINISTRATIVE | Facility: HOSPITAL | Age: 59
End: 2024-01-17
Payer: COMMERCIAL

## 2024-01-17 DIAGNOSIS — Z12.31 SCREENING MAMMOGRAM FOR BREAST CANCER: Primary | ICD-10-CM

## 2024-01-17 DIAGNOSIS — Z78.0 MENOPAUSE: ICD-10-CM

## 2024-01-17 DIAGNOSIS — Z13.820 SCREENING FOR OSTEOPOROSIS: ICD-10-CM

## 2024-02-13 ENCOUNTER — SPECIALTY PHARMACY (OUTPATIENT)
Dept: ONCOLOGY | Facility: HOSPITAL | Age: 59
End: 2024-02-13
Payer: COMMERCIAL

## 2024-02-16 ENCOUNTER — OFFICE VISIT (OUTPATIENT)
Dept: NEUROLOGY | Facility: CLINIC | Age: 59
End: 2024-02-16
Payer: COMMERCIAL

## 2024-02-16 VITALS
DIASTOLIC BLOOD PRESSURE: 66 MMHG | HEIGHT: 63 IN | SYSTOLIC BLOOD PRESSURE: 106 MMHG | OXYGEN SATURATION: 96 % | HEART RATE: 62 BPM | BODY MASS INDEX: 41.82 KG/M2 | WEIGHT: 236 LBS

## 2024-02-16 DIAGNOSIS — G43.C0 PERIODIC HEADACHE SYNDROME, NOT INTRACTABLE: Primary | Chronic | ICD-10-CM

## 2024-02-16 DIAGNOSIS — G47.33 OBSTRUCTIVE SLEEP APNEA SYNDROME: Chronic | ICD-10-CM

## 2024-02-16 PROCEDURE — 99214 OFFICE O/P EST MOD 30 MIN: CPT | Performed by: PSYCHIATRY & NEUROLOGY

## 2024-02-16 RX ORDER — RIZATRIPTAN BENZOATE 10 MG/1
10 TABLET ORAL ONCE
Qty: 12 TABLET | Refills: 11 | Status: SHIPPED | OUTPATIENT
Start: 2024-02-16 | End: 2024-02-16

## 2024-02-23 ENCOUNTER — HOSPITAL ENCOUNTER (OUTPATIENT)
Dept: MAMMOGRAPHY | Facility: HOSPITAL | Age: 59
Discharge: HOME | End: 2024-02-23
Payer: COMMERCIAL

## 2024-02-23 ENCOUNTER — HOSPITAL ENCOUNTER (OUTPATIENT)
Dept: BONE DENSITY | Facility: HOSPITAL | Age: 59
Discharge: HOME | End: 2024-02-23
Payer: COMMERCIAL

## 2024-02-23 DIAGNOSIS — Z13.820 SCREENING FOR OSTEOPOROSIS: ICD-10-CM

## 2024-02-23 DIAGNOSIS — Z78.0 MENOPAUSE: ICD-10-CM

## 2024-02-23 DIAGNOSIS — Z12.31 SCREENING MAMMOGRAM FOR BREAST CANCER: ICD-10-CM

## 2024-02-23 PROCEDURE — 77067 SCR MAMMO BI INCL CAD: CPT

## 2024-02-23 PROCEDURE — 77080 DXA BONE DENSITY AXIAL: CPT

## 2024-02-23 PROCEDURE — 77063 BREAST TOMOSYNTHESIS BI: CPT

## 2024-02-27 ENCOUNTER — OFFICE VISIT (OUTPATIENT)
Dept: INTERNAL MEDICINE | Facility: CLINIC | Age: 59
End: 2024-02-27
Payer: COMMERCIAL

## 2024-02-27 VITALS
BODY MASS INDEX: 41 KG/M2 | TEMPERATURE: 98 F | DIASTOLIC BLOOD PRESSURE: 76 MMHG | SYSTOLIC BLOOD PRESSURE: 116 MMHG | WEIGHT: 232 LBS | HEART RATE: 56 BPM | RESPIRATION RATE: 16 BRPM

## 2024-02-27 DIAGNOSIS — J01.40 ACUTE NON-RECURRENT PANSINUSITIS: ICD-10-CM

## 2024-02-27 DIAGNOSIS — H66.003 NON-RECURRENT ACUTE SUPPURATIVE OTITIS MEDIA OF BOTH EARS WITHOUT SPONTANEOUS RUPTURE OF TYMPANIC MEMBRANES: Primary | ICD-10-CM

## 2024-02-27 RX ORDER — CEFDINIR 300 MG/1
300 CAPSULE ORAL 2 TIMES DAILY
Qty: 14 CAPSULE | Refills: 0 | Status: SHIPPED | OUTPATIENT
Start: 2024-02-27

## 2024-02-27 NOTE — PROGRESS NOTES
"    Office Note     Name: Liliana Quevedo    : 1965     MRN: [id number]     Chief Complaint  Nasal Congestion and Earache    Subjective     History of Present Illness:  Liliana Quevedo is a 58 y.o. female who presents today for pressure in her head, bilateral ear pain, sneezing and coughing at times for 4 days.    She denies any fevers. She denies any sick exposures. She reports home treated with Motrin and Tylenol without relief.    Review of Systems:   Review of Systems   Constitutional:  Negative for fever.   HENT:  Positive for ear pain and sneezing.    Respiratory:  Positive for cough.        Past Medical History:   Past Medical History:   Diagnosis Date    Acute sinusitis     Assessed By: Geremias Ramesh (Internal Medicine); Last Assessed: 2015    Allergic rhinitis     Carpal tunnel syndrome     Colon polyp     SIGMOID    DDD (degenerative disc disease), lumbar     De Quervain's tenosynovitis     Depression     Edema     Assessed By: Geremias Ramesh (Internal Medicine); Last Assessed: 2014    Elevated alkaline phosphatase level     102    Fatigue     Gastric ulcer     GERD (gastroesophageal reflux disease)     on Protonix.  Says sx's not bad even if misses meds, \"depends on what I eat\".  EGD GDW  40 cm, path DE reflux.  serum h. pyl neg    Hematuria     Hyperlipidemia     Hypertension     per prim MD note    Hypothyroidism     w/ hx thyromegaly s/p CASTILLO.     Incomplete right bundle branch block     Low back pain     Migraine     Mild cardiomegaly     Morbid obesity     Near syncope 2018    Nephrolithiasis     Osteoarthritis     Peripheral edema     Polyp of sigmoid colon     Scoliosis     Sleep apnea     CPAP compliant    Tendinitis     Viral URI     Assessed By: Geremias Ramesh (Internal Medicine); Last Assessed: 2015    Vitamin D deficiency     Weight gain     Assessed By: Geremias Ramesh (Internal Medicine); Last Assessed: 2014       Past Surgical History:   Past Surgical " History:   Procedure Laterality Date    ANKLE SURGERY Right 2000    dislocation repair w/ plate     CARPAL TUNNEL RELEASE Right     COLONOSCOPY  2011    screening    CYST REMOVAL Right     right index finger    GASTRIC SLEEVE LAPAROSCOPIC N/A 02/22/2017    Procedure: GASTRIC SLEEVE LAPAROSCOPIC;  Surgeon: Sergio Gibbons MD;  Location: [facility];  Service:     HYSTERECTOMY Bilateral 2004    (open) w/ oophorectomy - uterine fibroids and ovarian cysts    KNEE SURGERY Left 2015     ligament repair    LAPAROSCOPIC CHOLECYSTECTOMY  2011    for stones    NOSE SURGERY      OOPHORECTOMY Bilateral 2004       Immunizations:   Immunization History   Administered Date(s) Administered    31-influenza Vac Quardvalent Preservativ 10/01/2017, 10/04/2019, 09/16/2022    COVID-19 (PFIZER) BIVALENT 12+YRS 09/16/2022    COVID-19 (PFIZER) Purple Cap Monovalent 01/20/2021, 02/12/2021, 10/12/2021    Flu Vaccine Intradermal Quad 18-64YR 10/04/2019, 09/24/2020, 10/01/2021    Flu Vaccine Quad PF >36MO 10/30/2017, 11/16/2018    Flu Vaccine Split Quad 10/01/2017, 10/30/2017    Fluzone (or Fluarix & Flulaval for VFC) >6mos 10/04/2019    Hepatitis A 02/07/2018, 08/22/2018    Influenza Injectable Mdck Pf Quad 10/25/2023    Influenza, Unspecified 09/16/2022    Tdap 02/07/2018    flucelvax quad pfs =>4 YRS 09/24/2020    influenza Split 10/05/2016        Medications:     Current Outpatient Medications:     citalopram (CeleXA) 40 MG tablet, TAKE ONE TABLET BY MOUTH DAILY, Disp: 90 tablet, Rfl: 3    famotidine (PEPCID) 20 MG tablet, TAKE 1 TABLET BY MOUTH TWICE A DAY, Disp: 180 tablet, Rfl: 0    galcanezumab-gnlm (EMGALITY) 120 MG/ML auto-injector pen, Inject 1 mL under the skin into the appropriate area as directed Every 28 (Twenty-Eight) Days., Disp: 1 mL, Rfl: 3    ibandronate (BONIVA) 150 MG tablet, ibandronate 150 mg tablet  Take 1 tablet every month by oral route for 90 days., Disp: , Rfl:     levocetirizine (XYZAL) 5 MG tablet, TAKE ONE  TABLET BY MOUTH EVERY EVENING, Disp: 90 tablet, Rfl: 1    levothyroxine (SYNTHROID, LEVOTHROID) 137 MCG tablet, Take 1 tablet by mouth Daily., Disp: 90 tablet, Rfl: 3    montelukast (SINGULAIR) 10 MG tablet, TAKE ONE TABLET BY MOUTH ONCE NIGHTLY, Disp: 90 tablet, Rfl: 1    pantoprazole (PROTONIX) 40 MG EC tablet, TAKE ONE TABLET BY MOUTH DAILY, Disp: 90 tablet, Rfl: 2    cefdinir (OMNICEF) 300 MG capsule, Take 1 capsule by mouth 2 (Two) Times a Day., Disp: 14 capsule, Rfl: 0    Allergies:   Allergies   Allergen Reactions    Ampicillin Hives    Bactrim [Sulfamethoxazole-Trimethoprim] Hives and GI Intolerance    Sulfa Antibiotics Hives     Hot and cold flashes, N&V, diarrhea       Family History:   Family History   Problem Relation Age of Onset    Cancer Other         COLON CANCER    Arthritis Other     Sleep apnea Other     Obesity Mother     Diabetes Mother     Hypertension Mother     Cancer Mother     Sleep apnea Mother     Breast cancer Maternal Aunt         DX AGE LATE 50'S    Obesity Father     Dementia Father     Obesity Sister     Ovarian cancer Sister 47    Obesity Brother     Diabetes Brother     Hypertension Brother     Obesity Maternal Grandmother     Diabetes Maternal Grandmother     Hypertension Maternal Grandmother     Heart attack Maternal Grandmother     Sleep apnea Maternal Grandmother     Cancer Maternal Grandmother     Dementia Maternal Grandmother     Stroke Maternal Grandfather        Social History:   Social History     Socioeconomic History    Marital status:    Tobacco Use    Smoking status: Never     Passive exposure: Never    Smokeless tobacco: Never   Vaping Use    Vaping Use: Never used   Substance and Sexual Activity    Alcohol use: No    Drug use: No    Sexual activity: Not Currently     Partners: Male     Birth control/protection: Hysterectomy         Objective     Vital Signs  /76 (BP Location: Left arm, Patient Position: Sitting, Cuff Size: Adult)   Pulse 56   Temp 98  "°F (36.7 °C) (Infrared)   Resp 16   Wt 105 kg (232 lb)   BMI 41.00 kg/m²   Estimated body mass index is 41 kg/m² as calculated from the following:    Height as of 2/16/24: 160.2 cm (63.07\").    Weight as of this encounter: 105 kg (232 lb).            Physical Exam  Vitals and nursing note reviewed.   Constitutional:       Appearance: Normal appearance.   HENT:      Right Ear: Tympanic membrane is erythematous and bulging.      Left Ear: Tympanic membrane is erythematous and bulging.      Nose: Congestion and rhinorrhea present.   Cardiovascular:      Rate and Rhythm: Normal rate and regular rhythm.      Pulses: Normal pulses.      Heart sounds: Normal heart sounds.   Pulmonary:      Effort: Pulmonary effort is normal.      Breath sounds: Normal breath sounds.   Skin:     General: Skin is warm and dry.   Neurological:      Mental Status: She is alert.   Psychiatric:         Mood and Affect: Mood normal.         Behavior: Behavior normal.          Assessment and Plan     1. Non-recurrent acute suppurative otitis media of both ears without spontaneous rupture of tympanic membranes    - cefdinir (OMNICEF) 300 MG capsule; Take 1 capsule by mouth 2 (Two) Times a Day.  Dispense: 14 capsule; Refill: 0    2. Acute non-recurrent pansinusitis    - cefdinir (OMNICEF) 300 MG capsule; Take 1 capsule by mouth 2 (Two) Times a Day.  Dispense: 14 capsule; Refill: 0       Reviewed medications, potential side effects and signs and symptoms to report. Discussed risk versus benefits of treatment plan with patient and/or family-including medications, labs and radiology that may be ordered. Addressed questions and concerns during visit. Patient and/or family verbalized understanding and agree with plan. Instructed to call the office with any questions and report to ER with any life-threatening symptoms.     Patient counseled on the side effects of prescribed medication and verbalized good understanding.  Recommended taking probiotics " while taking antibiotics.  Patient is to call office for any new or worsening symptoms.  Patient verbalized understanding of indications to report to the ER.      Follow Up  No follow-ups on file.    Yahaira Alicia PA-C  [facility]  [address] [id number] [phone]    Transcribed from ambient dictation for Yahaira Alicia PA-C by Debby Landon.  02/27/24   11:55 EST    Patient or patient representative verbalized consent to the visit recording.  I have personally performed the services described in this document as transcribed by the above individual, and it is both accurate and complete.

## 2024-03-07 ENCOUNTER — SPECIALTY PHARMACY (OUTPATIENT)
Dept: ONCOLOGY | Facility: HOSPITAL | Age: 59
End: 2024-03-07
Payer: COMMERCIAL

## 2024-03-20 ENCOUNTER — SPECIALTY PHARMACY (OUTPATIENT)
Dept: ONCOLOGY | Facility: HOSPITAL | Age: 59
End: 2024-03-20
Payer: COMMERCIAL

## 2024-03-20 NOTE — PROGRESS NOTES
"   Specialty Pharmacy Patient Management Program  Neurology Reassessment     Liliana Quevedo is a 58 y.o. female with migraines seen by a Neurology provider and enrolled in the Neurology Patient Management program offered by [facility].  A follow-up outreach was conducted, including assessment of continued therapy appropriateness, medication adherence, and side effect incidence and management for Emgality.     Changes to Insurance Coverage or Financial Support  [employer], Emgality co-pay card     Relevant Past Medical History and Comorbidities  Relevant medical history and concomitant health conditions were discussed with the patient. The patient's chart has been reviewed for relevant past medical history and comorbid health conditions and updated as necessary.   Past Medical History:   Diagnosis Date    Acute sinusitis     Assessed By: Geremias Ramesh (Internal Medicine); Last Assessed: 23 Jan 2015    Allergic rhinitis     Carpal tunnel syndrome     Colon polyp     SIGMOID    DDD (degenerative disc disease), lumbar     De Quervain's tenosynovitis     Depression     Edema     Assessed By: Geremias Ramesh (Internal Medicine); Last Assessed: 26 Nov 2014    Elevated alkaline phosphatase level     102    Fatigue     Gastric ulcer     GERD (gastroesophageal reflux disease)     on Protonix.  Says sx's not bad even if misses meds, \"depends on what I eat\".  EGD GDW 6/16 40 cm, path DE reflux.  serum h. pyl neg    Hematuria     Hyperlipidemia     Hypertension     per prim MD note    Hypothyroidism     w/ hx thyromegaly s/p CASTILLO.     Incomplete right bundle branch block     Low back pain     Migraine     Mild cardiomegaly     Morbid obesity     Near syncope 06/26/2018    Nephrolithiasis     Osteoarthritis     Peripheral edema     Polyp of sigmoid colon     Scoliosis     Sleep apnea     CPAP compliant    Tendinitis     Viral URI     Assessed By: Geremias Ramesh (Internal Medicine); Last Assessed: 23 Jan 2015 "    Vitamin D deficiency     Weight gain     Assessed By: Geremias Ramesh (Internal Medicine); Last Assessed: 26 Nov 2014     Social History     Socioeconomic History    Marital status:    Tobacco Use    Smoking status: Never     Passive exposure: Never    Smokeless tobacco: Never   Vaping Use    Vaping status: Never Used   Substance and Sexual Activity    Alcohol use: No    Drug use: No    Sexual activity: Not Currently     Partners: Male     Birth control/protection: Hysterectomy     Problem list reviewed by Susan Turcios, PharmD on 3/20/2024 at  9:35 AM    Hospitalizations and Urgent Care Since Last Assessment  ED Visits, Admissions, or Hospitalizations: none   Urgent Office Visits: none     Allergies  Known allergies and reactions were discussed with the patient. The patient's chart has been reviewed for allergy information and updated as necessary.   Allergies   Allergen Reactions    Ampicillin Hives    Bactrim [Sulfamethoxazole-Trimethoprim] Hives and GI Intolerance    Sulfa Antibiotics Hives     Hot and cold flashes, N&V, diarrhea     Allergies reviewed by Susan Turcios, PharmD on 3/20/2024 at  9:34 AM    Relevant Laboratory Values  Common labs          9/11/2023    12:31   Common Labs   Glucose 80    BUN 13    Creatinine 1.00    Sodium 139    Potassium 4.6    Chloride 102    Calcium 9.9    Albumin 4.4    Total Bilirubin 0.4    Alkaline Phosphatase 92    AST (SGOT) 22    ALT (SGPT) 15    WBC 4.73    Hemoglobin 14.3    Hematocrit 43.0    Platelets 234    Total Cholesterol 202    Triglycerides 120    HDL Cholesterol 49    LDL Cholesterol  131        Lab Assessment  N/A     Current Medication List  This medication list has been reviewed with the patient and evaluated for any interactions or necessary modifications/recommendations, and updated to include all prescription medications, OTC medications, and supplements the patient is currently taking.  This list reflects what is contained in the patient's  profile, which has also been marked as reviewed to communicate to other providers it is the most up to date version of the patient's current medication therapy.     Current Outpatient Medications:     cefdinir (OMNICEF) 300 MG capsule, Take 1 capsule by mouth 2 (Two) Times a Day., Disp: 14 capsule, Rfl: 0    citalopram (CeleXA) 40 MG tablet, TAKE ONE TABLET BY MOUTH DAILY, Disp: 90 tablet, Rfl: 3    famotidine (PEPCID) 20 MG tablet, TAKE 1 TABLET BY MOUTH TWICE A DAY, Disp: 180 tablet, Rfl: 0    galcanezumab-gnlm (EMGALITY) 120 MG/ML auto-injector pen, Inject 1 mL under the skin into the appropriate area as directed Every 28 (Twenty-Eight) Days., Disp: 1 mL, Rfl: 11    ibandronate (BONIVA) 150 MG tablet, ibandronate 150 mg tablet  Take 1 tablet every month by oral route for 90 days., Disp: , Rfl:     levocetirizine (XYZAL) 5 MG tablet, TAKE ONE TABLET BY MOUTH EVERY EVENING, Disp: 90 tablet, Rfl: 1    levothyroxine (SYNTHROID, LEVOTHROID) 137 MCG tablet, Take 1 tablet by mouth Daily., Disp: 90 tablet, Rfl: 3    montelukast (SINGULAIR) 10 MG tablet, TAKE ONE TABLET BY MOUTH ONCE NIGHTLY, Disp: 90 tablet, Rfl: 1    pantoprazole (PROTONIX) 40 MG EC tablet, TAKE ONE TABLET BY MOUTH DAILY, Disp: 90 tablet, Rfl: 2    Medicines reviewed by Susan Turcios, PharmD on 3/20/2024 at  9:34 AM    Drug Interactions  No drug/drug interactions noted with Emgality     Adverse Drug Reactions  Medication tolerability: Tolerating with no to minimal ADRs          Medication plan: Continue therapy with normal follow-up  Plan for ADR Management: not required      Adherence, Self-Administration, and Current Therapy Problems  Adherence related patient's specialty therapy was discussed with the patient. The Adherence segment of this outreach has been reviewed and updated.   Adherence Questions  Linked Medication(s) Assessed: Galcanezumab-Gnlm  On average, how many doses/injections does the patient miss per month?: 0  What are the identified  reasons for non-adherence or missed doses? : no problems identified  What is the estimated medication adherence level?: %  Based on the patient/caregiver response and refill history, does this patient require an MTP to track adherence improvements?: no    Additional Barriers to Patient Self-Administration: none  Methods for Supporting Patient Self-Administration: pt adept with Emgality self-injections.    Recently Close Medication Therapy Problems  No medication therapy recommendations to display  Open Medication Therapy Problems  No medication therapy recommendations to display     Goals of Therapy  Goals related to the patient's specialty therapy was discussed with the patient. The Patient Goals segment of this outreach has been reviewed and updated.    Goals Addressed Today        Specialty Pharmacy General Goal      Decrease intensity and frequency of migraines.  4/6/23 - Headaches improving with current treatment - HA frequency is once every two months.  Last for few minutes.   Aborts with Maxalt.                   Quality of Life Assessment   Quality of Life related to the patient's enrollment in the patient management program and services provided was discussed with the patient. The QOL segment of this outreach has been reviewed and updated.   Quality of Life Improvement Scale: 8-Moderately better    Reassessment Plan & Follow-Up  Medication Therapy Changes: Continue Emgality 120mg SubQ monthly  Related Plans, Therapy Recommendations, or Issues to Be Addressed: none  Pharmacist to perform regular reassessments no more than (6) months from the previous assessment.  Care Coordinator to set up future refill outreaches, coordinate prescription delivery, and escalate clinical questions to pharmacist.     Attestation  Therapeutic appropriateness: Appropriate  I attest the patient was actively involved in and has agreed to the above plan of care. If the prescribed therapy is at any point deemed not appropriate  based on the current or future assessments, a consultation will be initiated with the patient's specialty care provider to determine the best course of action. The revised plan of therapy will be documented along with any additional patient education provided. Discussed aforementioned material with patient via telemedicine.    Susan Turcios, PharmD, [facility]  Clinic Specialty Pharmacist, Neurology  3/20/2024  09:37 EDT

## 2024-03-20 NOTE — PROGRESS NOTES
Specialty Pharmacy Refill Coordination Note     Liliana is a 58 y.o. female contacted today regarding refills of  Emgality specialty medication(s).  Next dose due on 3/27/24  Reviewed and verified with patient:  Allergies  Meds  Problems       Specialty medication(s) and dose(s) confirmed: yes    Refill Questions      Flowsheet Row Most Recent Value   Changes to allergies? No   Changes to medications? No   New conditions or infections since last clinic visit No   Unplanned office visit, urgent care, ED, or hospital admission in the last 4 weeks  No   How does patient/caregiver feel medication is working? Very good   Financial problems or insurance changes  No   If yes, describe changes in insurance or financial issues. N/A   Since the previous refill, were any specialty medication doses or scheduled injections missed or delayed?  No   If yes, please provide the amount N/A   Why were doses missed? N/A   Does this patient require a clinical escalation to a pharmacist? No            Delivery Questions      Flowsheet Row Most Recent Value   Delivery method FedEx   Delivery address verified with patient/caregiver? Yes   Delivery address Home   Number of medications in delivery 1   Medication(s) being filled and delivered Galcanezumab-G[facility]   Copay verified? Yes   Copay amount Emgality co-pay $0   Copay form of payment No copayment ($0)              Medication Adherence    Adherence tools used: calendar  Support network for adherence: healthcare provider   Other support network: Pharmacy             Follow-up: 28 day(s)     Susan Turcios, MarianaD

## 2024-04-05 ENCOUNTER — OFFICE VISIT (OUTPATIENT)
Dept: INTERNAL MEDICINE | Facility: CLINIC | Age: 59
End: 2024-04-05
Payer: COMMERCIAL

## 2024-04-05 VITALS
DIASTOLIC BLOOD PRESSURE: 72 MMHG | TEMPERATURE: 97.5 F | BODY MASS INDEX: 40.96 KG/M2 | HEIGHT: 63 IN | HEART RATE: 56 BPM | RESPIRATION RATE: 18 BRPM | WEIGHT: 231.2 LBS | SYSTOLIC BLOOD PRESSURE: 118 MMHG

## 2024-04-05 DIAGNOSIS — J01.40 ACUTE NON-RECURRENT PANSINUSITIS: Primary | ICD-10-CM

## 2024-04-05 DIAGNOSIS — J30.2 SEASONAL ALLERGIES: ICD-10-CM

## 2024-04-05 PROCEDURE — 99213 OFFICE O/P EST LOW 20 MIN: CPT | Performed by: NURSE PRACTITIONER

## 2024-04-05 RX ORDER — FLUTICASONE PROPIONATE 50 MCG
2 SPRAY, SUSPENSION (ML) NASAL DAILY
COMMUNITY

## 2024-04-05 RX ORDER — METHYLPREDNISOLONE 4 MG/1
TABLET ORAL
Qty: 21 TABLET | Refills: 0 | Status: SHIPPED | OUTPATIENT
Start: 2024-04-05

## 2024-04-05 RX ORDER — AZITHROMYCIN 250 MG/1
TABLET, FILM COATED ORAL
Qty: 6 TABLET | Refills: 0 | Status: SHIPPED | OUTPATIENT
Start: 2024-04-05

## 2024-04-05 NOTE — PROGRESS NOTES
Patient Name: Liliana Quevedo  : 1965   MRN: [id number]     Chief Complaint:    Chief Complaint   Patient presents with    Sinus Problem    Earache       History of Present Illness: Liliana Quevedo is a 58 y.o. female.  History of Present Illness  The patient is here for evaluation of sinus pain and pressure that started about 5 days ago.    The patient has been experiencing allergy symptoms for several weeks, which commenced following the lawn mowing process. Concurrently, she has been experiencing right-sided otalgia. Approximately a month ago, she suffered a bilateral infection, which was successfully treated with Cefdinir. However, her symptoms have since recurred. Her sinus pain manifests as pain localized under both eyes and on her forehead. She denies experiencing fevers, chills, dyspnea, nausea, vomiting, or diarrhea. However, she does report a mild cough, postnasal drainage, and a scratchy throat. She has not been exposed to COVID-19 or influenza and has not been in contact with sick individuals. Despite the use of Coricidin HBP, her symptoms have not improved. Her current medication regimen includes Singulair. She reports sneezing, nasal congestion, and uses a CPAP machine at night.   She is allergic to AMOXICILLIN.         Subjective     Review of System: Review of Systems     Medications:     Current Outpatient Medications:     citalopram (CeleXA) 40 MG tablet, TAKE ONE TABLET BY MOUTH DAILY, Disp: 90 tablet, Rfl: 3    famotidine (PEPCID) 20 MG tablet, TAKE 1 TABLET BY MOUTH TWICE A DAY, Disp: 180 tablet, Rfl: 0    fluticasone (FLONASE) 50 MCG/ACT nasal spray, 2 sprays into the nostril(s) as directed by provider Daily., Disp: , Rfl:     galcanezumab-gnlm (EMGALITY) 120 MG/ML auto-injector pen, Inject 1 mL under the skin into the appropriate area as directed Every 28 (Twenty-Eight) Days., Disp: 1 mL, Rfl: 11    ibandronate (BONIVA) 150 MG tablet, ibandronate 150 mg tablet  Take 1 tablet every  "month by oral route for 90 days., Disp: , Rfl:     levocetirizine (XYZAL) 5 MG tablet, TAKE ONE TABLET BY MOUTH EVERY EVENING, Disp: 90 tablet, Rfl: 1    levothyroxine (SYNTHROID, LEVOTHROID) 137 MCG tablet, Take 1 tablet by mouth Daily., Disp: 90 tablet, Rfl: 3    montelukast (SINGULAIR) 10 MG tablet, TAKE ONE TABLET BY MOUTH ONCE NIGHTLY, Disp: 90 tablet, Rfl: 1    pantoprazole (PROTONIX) 40 MG EC tablet, TAKE ONE TABLET BY MOUTH DAILY, Disp: 90 tablet, Rfl: 2    azithromycin (Zithromax Z-Doroteo) 250 MG tablet, Take 2 tablets by mouth on day 1, then 1 tablet daily on days 2-5, Disp: 6 tablet, Rfl: 0    methylPREDNISolone (MEDROL) 4 MG dose pack, Take as directed on package instructions., Disp: 21 tablet, Rfl: 0    Allergies:   Allergies   Allergen Reactions    Ampicillin Hives    Bactrim [Sulfamethoxazole-Trimethoprim] Hives and GI Intolerance    Sulfa Antibiotics Hives     Hot and cold flashes, N&V, diarrhea       Objective     Physical Exam:   Vital Signs:   Vitals:    04/05/24 1522   BP: 118/72   BP Location: Right arm   Patient Position: Sitting   Cuff Size: Adult   Pulse: 56   Resp: 18   Temp: 97.5 °F (36.4 °C)   TempSrc: Infrared   Weight: 105 kg (231 lb 3.2 oz)   Height: 160 cm (63\")   PainSc:   6     Body mass index is 40.96 kg/m².        Physical Exam  Constitutional:       Appearance: She is ill-appearing.   HENT:      Right Ear: Tympanic membrane normal.      Left Ear: Tympanic membrane is erythematous.      Nose: Congestion and rhinorrhea present.   Cardiovascular:      Rate and Rhythm: Normal rate and regular rhythm.      Heart sounds: No murmur heard.  Pulmonary:      Effort: No respiratory distress.      Breath sounds: No stridor. No wheezing, rhonchi or rales.   Abdominal:      General: Bowel sounds are normal.      Tenderness: There is no abdominal tenderness.   Lymphadenopathy:      Cervical: No cervical adenopathy.   Skin:     General: Skin is warm and dry.   Physical Exam  The left ear is slightly " red, but it is not bulging.       Results       Assessment / Plan      Assessment/Plan:   Diagnoses and all orders for this visit:    1. Acute non-recurrent pansinusitis (Primary)  -     methylPREDNISolone (MEDROL) 4 MG dose pack; Take as directed on package instructions.  Dispense: 21 tablet; Refill: 0  -     azithromycin (Zithromax Z-Doroteo) 250 MG tablet; Take 2 tablets by mouth on day 1, then 1 tablet daily on days 2-5  Dispense: 6 tablet; Refill: 0    2. Seasonal allergies  -     methylPREDNISolone (MEDROL) 4 MG dose pack; Take as directed on package instructions.  Dispense: 21 tablet; Refill: 0       Assessment & Plan  1. Acute sinusitis.  The patient's symptoms may be attributed to a flare-up of her allergies. A trial of steroids will be initiated over the weekend, to be taken with food. The patient is advised to monitor her symptoms. A Z-Doroteo will be prescribed. If there is no improvement in her allergies or if her symptoms do not improve, sinus pain and pressure persist, she can start the  antibiotics. The patient is encouraged to maintain adequate hydration and rest. Should her symptoms worsen, such as a high fever, thick drainage, or if her condition deteriorates, she is to contact us on Monday.         Follow Up:   prn  Patient or patient representative verbalized consent for the use of Ambient Listening during the visit with  CASE Vance for chart documentation. 4/7/2024  15:46 EDT    CASE Vance  [facility]

## 2024-04-19 ENCOUNTER — SPECIALTY PHARMACY (OUTPATIENT)
Dept: ONCOLOGY | Facility: HOSPITAL | Age: 59
End: 2024-04-19
Payer: COMMERCIAL

## 2024-04-19 NOTE — PROGRESS NOTES
Specialty Pharmacy Refill Coordination Note     Liliana is a 58 y.o. female contacted today regarding refills of  Emgality specialty medication(s). Patient is due for injection on 4/23   .      Reviewed and verified with patient:       Specialty medication(s) and dose(s) confirmed: yes    Refill Questions      Flowsheet Row Most Recent Value   Changes to allergies? No   Changes to medications? No   New conditions or infections since last clinic visit No   Unplanned office visit, urgent care, ED, or hospital admission in the last 4 weeks  No   How does patient/caregiver feel medication is working? Very good   Financial problems or insurance changes  No   If yes, describe changes in insurance or financial issues. N/A   Since the previous refill, were any specialty medication doses or scheduled injections missed or delayed?  No   If yes, please provide the amount N/A   Why were doses missed? N/A   Does this patient require a clinical escalation to a pharmacist? No            Delivery Questions      Flowsheet Row Most Recent Value   Delivery method [employer]Ex   Delivery address verified with patient/caregiver? Yes   Delivery address Home   Number of medications in delivery 1   Medication(s) being filled and delivered Galcanezumab-G[facility]   Doses left of specialty medications Emgality=0   Copay verified? Yes   Copay amount Em[facility] co-pay $0   Copay form of payment Credit/debit on file              Medication Adherence    Adherence tools used: calendar  Support network for adherence: healthcare provider   Other support network: Pharmacy             Follow-up: 28 day(s)     Michelle Barbosa, Pharmacy Technician  Specialty Pharmacy Technician

## 2024-04-29 DIAGNOSIS — K21.9 GASTROESOPHAGEAL REFLUX DISEASE WITHOUT ESOPHAGITIS: ICD-10-CM

## 2024-04-29 RX ORDER — FAMOTIDINE 20 MG/1
20 TABLET, FILM COATED ORAL 2 TIMES DAILY
Qty: 180 TABLET | Refills: 0 | Status: SHIPPED | OUTPATIENT
Start: 2024-04-29

## 2024-05-17 DIAGNOSIS — J30.2 SEASONAL ALLERGIES: ICD-10-CM

## 2024-05-17 RX ORDER — LEVOCETIRIZINE DIHYDROCHLORIDE 5 MG/1
TABLET, FILM COATED ORAL
Qty: 90 TABLET | Refills: 1 | Status: SHIPPED | OUTPATIENT
Start: 2024-05-17

## 2024-05-24 DIAGNOSIS — J30.9 ALLERGIC RHINITIS: ICD-10-CM

## 2024-05-24 NOTE — PROGRESS NOTES
[facility] BARIATRIC SURGERY  [address]  [facility] [address]  [phone]      Patient  Name:  Liliana Quevedo.  :  1965      Chief Complaint:  weight gain; unable to maintain weight loss    History of Present Illness:    Liliana Quevedo is a 51 y.o. female who presents today for evaluation, education and consultation regarding weight loss surgery. The patient is interested in sleeve gastrectomy     • Length of time being obese: was six  years.  • Amount of time 100 lbs overweight: was four  years  Most weight lost was 65 lbs with weight watchers, but only able to maintain x 1 year.        Past Medical History   Diagnosis Date   • Allergic rhinitis    • Arthritis    • Carpal tunnel syndrome    • Colon polyp      SIGMOID   • Depression    • Dyspnea on exertion    • Fatigue    • Gastric ulcer    • GERD (gastroesophageal reflux disease)      on Protonix   • Hematuria    • Hyperlipidemia    • Hypothyroidism      w/ hx thyromegaly s/p CASTILLO.    • Low back pain    • Morbid obesity    • Nephrolithiasis    • Osteoporosis    • Peripheral edema    • Sleep apnea      CPAP compliant   • Stroke      STROKE SYNDROME   • Tendinitis    • Vitamin D deficiency      Past Surgical History   Procedure Laterality Date   • Ankle surgery Right      dislocation repair w/ plate    • Nose surgery     • Hysterectomy Bilateral      (open) w/ oophorectomy - uterine fibroids and ovarian cysts   • Colonoscopy       screening   • Laparoscopic cholecystectomy       for stones   • Knee surgery Left       ligament repair     Allergies   Allergen Reactions   • Ampicillin Hives   • Sulfa Antibiotics Nausea And Vomiting       Current Outpatient Prescriptions:   •  aspirin 81 MG tablet, Take  by mouth daily., Disp: , Rfl:   •  citalopram (CeleXA) 40 MG tablet, TAKE ONE TABLET BY MOUTH DAILY, Disp: 90 tablet, Rfl: 0  •  furosemide (LASIX) 40 MG tablet, TAKE ONE TABLET BY MOUTH DAILY AS  Report given to nurse at facility. Patient is resting ion room awaiting transport, set at 1630.   NEEDED **MUST CALL MD FOR APPOINTMENT, Disp: 30 tablet, Rfl: 3  •  KLOR-CON 20 MEQ CR tablet, TAKE ONE TABLET BY MOUTH DAILY, Disp: 30 tablet, Rfl: 2  •  levothyroxine (SYNTHROID, LEVOTHROID) 150 MCG tablet, Take  by mouth daily., Disp: , Rfl:   •  omega-3 acid ethyl esters (LOVAZA) 1 G capsule, Take  by mouth daily., Disp: , Rfl:   •  pantoprazole (PROTONIX) 40 MG EC tablet, TAKE ONE TABLET BY MOUTH DAILY, Disp: 30 tablet, Rfl: 2  •  pravastatin (PRAVACHOL) 20 MG tablet, Take  by mouth daily., Disp: , Rfl:   Social History     Social History   • Marital status:      Spouse name: N/A   • Number of children: N/A   • Years of education: N/A     Occupational History   • Not on file.     Social History Main Topics   • Smoking status: Never Smoker   • Smokeless tobacco: Never Used   • Alcohol use No   • Drug use: No   • Sexual activity: Not on file     Other Topics Concern   • Not on file     Social History Narrative     Family History   Problem Relation Age of Onset   • Cancer Other      COLON CANCER   • Arthritis Other    • Sleep apnea Other    • Ovarian cancer Mother 58   • Obesity Mother    • Diabetes Mother    • Hypertension Mother    • Cancer Mother    • Breast cancer Maternal Aunt      60's   • Obesity Father    • Obesity Sister    • Obesity Brother    • Diabetes Brother    • Hypertension Brother    • Obesity Maternal Grandmother    • Diabetes Maternal Grandmother    • Hypertension Maternal Grandmother    • Heart attack Maternal Grandmother    • Sleep apnea Maternal Grandmother    • Cancer Maternal Grandmother        Review Of Systems   Systemic: Feeling tired (fatigue).  No fever and no chills.  Recent weight gain.  Breasts: No mammogram concerns, no uterine cancer, and no ovarian cancer.  Patient has had 2 pregnancies previously.  Cardiovascular: No hypertension, no angina, and no ischemic heart disease.  Edema.  No deep venous thrombosis.  Pulmonary: No asthma.  Uses CPAP / BiPAP Suffers from sleep  apnea.  No pulmonary embolism.  Dyspnea during exertion.  Gastrointestinal: No dysphagia.  Reports heartburn.  No liver disease, no irritable bowel, and no pancreatic disease.  Genitourinary: Urinary loss of control.  No kidney stones, no kidney failure, and no renal insuff.  Endocrine: No gout and no diabetes.  Dyslipidemia and hypothyroidism.  No abnormal glucose levels.  Hematologic: No bleeding disorder and no prior blood transfusions.  Uses of blood thinners.  No anemia.  Musculoskeletal: No fibromyalgia.  Osteoarthritis, neck pain, wrist joint pain, shoulder joint pain, back pain, hip joint pain, knee joint pain, and ankle joint pain.  Neurological: No migraines, no seizures, and no strokes.  Psychological: No anxiety.  Depression.  No bipolar disorder.  Skin: No history of MRSA skin infections.   All other systems reviewed/negative.    Physical Exam:  Vital Signs:      There is no height or weight on file to calculate BMI.                Physical Exam   Constitutional: She is oriented to person, place, and time. She appears well-developed and well-nourished.   HENT:   Head: Normocephalic and atraumatic.   Eyes: Conjunctivae are normal. No scleral icterus.   Neck: Neck supple. No thyromegaly present.   Cardiovascular: Normal rate and regular rhythm.    No murmur heard.  Pulmonary/Chest: Effort normal and breath sounds normal. No respiratory distress. She has no wheezes. She has no rales.   Abdominal: Soft. Bowel sounds are normal. She exhibits no distension and no mass. There is no tenderness. No hernia.   Scars:  present; lap billy, pfannenstiel   Musculoskeletal: Normal range of motion. She exhibits no edema.   Neurological: She is alert and oriented to person, place, and time. Gait normal.   Skin: Skin is warm and dry. No rash noted.   Psychiatric: She has a normal mood and affect. Judgment normal.   Vitals reviewed.         Patient Active Problem List   Diagnosis   • Atopic rhinitis   • Anxiety   •  Arthralgia of multiple joints   • Knee pain   • Radial styloid tenosynovitis   • Depression   • Edema   • Gastroesophageal reflux disease   • Hyperlipidemia   • Hypothyroidism   • Low back pain   • Adiposity   • Obstructive sleep apnea syndrome   • Onychomycosis of toenail   • Calculus of kidney   • Vitamin D deficiency   • Obesity due to excess calories   • GERD (gastroesophageal reflux disease)   • Sleep apnea   • Morbid obesity   • Fatigue   • Dyspnea on exertion   • Peripheral edema       Assessment:    Liliana uQevedo is a 51 y.o. year old female with medically complicated obesity pursuing sleeve gastrectomy.    Weight loss surgery is deemed medically necessary given the following obesity related comorbidities including sleep apnea, dyslipidemia, GERD and depression with current   and There is no height or weight on file to calculate BMI..      Plan:  The patient has been advised that a letter of medical support must be obtained from her primary care physician or referring provider. A psychological evaluation will be performed for this patient as well. Preoperative testing will include: CBC, CMP, Fasting Lipids, TSH, H.Pylori, CXR, EKG and EGD     Preop Cardiac clearance will be required prior to surgery.      The patient was advised to start a high protein and low carbohydrate diet.  The patient was given individualized information by our dietician along with general group information and instructions.     Information on VLADIMIR educational video was given to the patient.  This is an internet based educational video that explains the surgical procedure chosen and answers basic questions regarding that procedure.     Lastly, the consultation plan was reviewed with the patient.      SYED Vazquez

## 2024-05-24 NOTE — TELEPHONE ENCOUNTER
LOV 09/11/2023  NOV     Tried to reach patient no answer left voicemail to return call    RELAY:  We recently received your message to refill your medication(s).  Our records indicate that you did not schedule a follow up appointment with your provider at your last visit on 09/11/2023. The medication that you are on requires a follow up appointment for additional refills. Patient was to schedule on or around 09/11/2024 for Fasting Physical    If she is unable to keep her appointment we will not be able to provider further refills.  Once appointment has been scheduled please update message with date and time so we can process the request.  We will forward the message to the provider to review the refill request.

## 2024-05-28 NOTE — TELEPHONE ENCOUNTER
2nd attempt     LOV 09/11/2023  NOV      Tried to reach patient no answer left voicemail to return call     RELAY:  We recently received your message to refill your medication(s).  Our records indicate that you did not schedule a follow up appointment with your provider at your last visit on 09/11/2023. The medication that you are on requires a follow up appointment for additional refills. Patient was to schedule on or around 09/11/2024 for Fasting Physical     If she is unable to keep her appointment we will not be able to provider further refills.  Once appointment has been scheduled please update message with date and time so we can process the request.  We will forward the message to the provider to review the refill request.

## 2024-05-29 ENCOUNTER — SPECIALTY PHARMACY (OUTPATIENT)
Dept: ONCOLOGY | Facility: HOSPITAL | Age: 59
End: 2024-05-29
Payer: COMMERCIAL

## 2024-05-29 NOTE — TELEPHONE ENCOUNTER
3rd attempt      LOV 09/11/2023  NOV      Tried to reach patient no answer left voicemail to return call     RELAY:  We recently received your message to refill your medication(s).  Our records indicate that you did not schedule a follow up appointment with your provider at your last visit on 09/11/2023. The medication that you are on requires a follow up appointment for additional refills. Patient was to schedule on or around 09/11/2024 for Fasting Physical     If she is unable to keep her appointment we will not be able to provider further refills.  Once appointment has been scheduled please update message with date and time so we can process the request.  We will forward the message to the provider to review the refill request.

## 2024-05-29 NOTE — PROGRESS NOTES
Specialty Pharmacy Refill Coordination Note     Liliana is a 58 y.o. female contacted today regarding refills of  Emgality specialty medication(s). Patient is due for injection on 6/1.      Reviewed and verified with patient:       Specialty medication(s) and dose(s) confirmed: yes    Refill Questions      Flowsheet Row Most Recent Value   Changes to allergies? No   Changes to medications? No   New conditions or infections since last clinic visit No   Unplanned office visit, urgent care, ED, or hospital admission in the last 4 weeks  No   How does patient/caregiver feel medication is working? Very good   Financial problems or insurance changes  No   If yes, describe changes in insurance or financial issues. N/A   Since the previous refill, were any specialty medication doses or scheduled injections missed or delayed?  Yes   If yes, please provide the amount Patient took injection a few days late   Why were doses missed? Forgot to take injection on due date   Does this patient require a clinical escalation to a pharmacist? No            Delivery Questions      Flowsheet Row Most Recent Value   Delivery method [employer]Ex   Delivery address verified with patient/caregiver? Yes   Delivery address Home   Number of medications in delivery 1   Medication(s) being filled and delivered Galcanezumab-Gnlm   Doses left of specialty medications Emgality=0   Copay verified? Yes   Copay amount Emgality co-pay $0   Copay form of payment No copayment ($0)              Medication Adherence    Adherence tools used: calendar  Support network for adherence: healthcare provider   Other support network: Pharmacy             Follow-up: 28 day(s)     Michelle Barbosa, Pharmacy Technician  Specialty Pharmacy Technician

## 2024-05-30 RX ORDER — MONTELUKAST SODIUM 10 MG/1
TABLET ORAL
Qty: 90 TABLET | Refills: 1 | Status: SHIPPED | OUTPATIENT
Start: 2024-05-30

## 2024-06-24 ENCOUNTER — SPECIALTY PHARMACY (OUTPATIENT)
Dept: ONCOLOGY | Facility: HOSPITAL | Age: 59
End: 2024-06-24
Payer: COMMERCIAL

## 2024-06-24 NOTE — PROGRESS NOTES
Specialty Pharmacy Refill Coordination Note     Liliana is a 58 y.o. female contacted today regarding refills of Emgality specialty medication(s).Patient is due for injection on 07/01.    Reviewed and verified with patient:       Specialty medication(s) and dose(s) confirmed: yes    Refill Questions      Flowsheet Row Most Recent Value   Changes to allergies? No   Changes to medications? No   New conditions or infections since last clinic visit No   Unplanned office visit, urgent care, ED, or hospital admission in the last 4 weeks  No   How does patient/caregiver feel medication is working? Good   Financial problems or insurance changes  No   If yes, describe changes in insurance or financial issues. N/A   Since the previous refill, were any specialty medication doses or scheduled injections missed or delayed?  No   If yes, please provide the amount N/A   Why were doses missed? N/A   Does this patient require a clinical escalation to a pharmacist? No            Delivery Questions      Flowsheet Row Most Recent Value   Delivery method [employer]Ex   Delivery address verified with patient/caregiver? Yes   Delivery address Home   Number of medications in delivery 1   Medication(s) being filled and delivered Galcanezumab-Gnlm   Doses left of specialty medications N/A   Copay verified? Yes   Copay amount N/A   Copay form of payment No copayment ($0)              Medication Adherence    Adherence tools used: calendar  Support network for adherence: healthcare provider   Other support network: Pharmacy             Follow-up: 28 day(s)     Beulah Ortiz  Specialty Pharmacy Technician

## 2024-07-03 ENCOUNTER — OFFICE VISIT (OUTPATIENT)
Age: 59
End: 2024-07-03
Payer: COMMERCIAL

## 2024-07-03 VITALS
HEART RATE: 66 BPM | SYSTOLIC BLOOD PRESSURE: 114 MMHG | HEIGHT: 63 IN | OXYGEN SATURATION: 99 % | DIASTOLIC BLOOD PRESSURE: 72 MMHG | WEIGHT: 233 LBS | BODY MASS INDEX: 41.29 KG/M2

## 2024-07-03 DIAGNOSIS — E89.0 POSTABLATIVE HYPOTHYROIDISM: Primary | ICD-10-CM

## 2024-07-03 LAB — TSH SERPL DL<=0.05 MIU/L-ACNC: 1.27 UIU/ML (ref 0.27–4.2)

## 2024-07-03 PROCEDURE — 84443 ASSAY THYROID STIM HORMONE: CPT | Performed by: INTERNAL MEDICINE

## 2024-07-03 NOTE — PROGRESS NOTES
"     Office Note      Date: 2024  Patient Name: Liliana Quevedo  MRN: [id number]  : 1965    Chief Complaint   Patient presents with    Hypothyroidism       History of Present Illness:   Liliana Quevedo is a 58 y.o. female who presents for Hypothyroidism  .   Current rx: t4- 137 mcg per day     Changes in history:NONE  Questions /problems:NONE    Subjective          Review of Systems:   Review of Systems   Constitutional:  Negative for fatigue and unexpected weight change.   HENT:  Negative for trouble swallowing.    Eyes:  Negative for visual disturbance.   Cardiovascular:  Negative for palpitations.   Endocrine: Negative for cold intolerance and heat intolerance.   Neurological:  Negative for tremors.   Psychiatric/Behavioral:  Negative for sleep disturbance.        The following portions of the patient's history were reviewed and updated as appropriate: allergies, current medications, past family history, past medical history, past social history, past surgical history, and problem list.    Objective     Visit Vitals  /72 (BP Location: Left arm, Patient Position: Sitting, Cuff Size: Adult)   Pulse 66   Ht 160 cm (63\")   Wt 106 kg (233 lb)   SpO2 99%   BMI 41.27 kg/m²           Physical Exam:  Physical Exam  Vitals reviewed.   Constitutional:       General: She is not in acute distress.     Appearance: Normal appearance. She is normal weight. She is not ill-appearing, toxic-appearing or diaphoretic.   Eyes:      Extraocular Movements: Extraocular movements intact.   Neck:      Comments: NO PALPABLE THYROID TISSUE  Lymphadenopathy:      Cervical: No cervical adenopathy.   Neurological:      Mental Status: She is alert.   Psychiatric:         Mood and Affect: Mood normal.         Thought Content: Thought content normal.         Judgment: Judgment normal.         Assessment / Plan      Assessment & Plan:  Problem List Items Addressed This Visit       Hypothyroidism - Primary    Overview     " folloing I 131 therapy for hyperthyroidism          Current Assessment & Plan     STABLE  Clinically euthyroid. Thyroid levels ordered. Medication to be adjusted accordingly.          Relevant Medications    levothyroxine (SYNTHROID, LEVOTHROID) 137 MCG tablet    Other Relevant Orders    TSH        Electronically signed by : Samm Ashley MD   07/03/2024

## 2024-07-05 DIAGNOSIS — E03.9 HYPOTHYROIDISM, UNSPECIFIED TYPE: ICD-10-CM

## 2024-07-05 RX ORDER — LEVOTHYROXINE SODIUM 137 UG/1
137 TABLET ORAL DAILY
Qty: 90 TABLET | Refills: 3 | Status: SHIPPED | OUTPATIENT
Start: 2024-07-05

## 2024-07-18 ENCOUNTER — SPECIALTY PHARMACY (OUTPATIENT)
Dept: ONCOLOGY | Facility: HOSPITAL | Age: 59
End: 2024-07-18
Payer: COMMERCIAL

## 2024-07-18 NOTE — PROGRESS NOTES
Specialty Pharmacy Refill Coordination Note     Liliana is a 58 y.o. female contacted today regarding refills of 07/28 specialty medication(s).    Reviewed and verified with patient:       Specialty medication(s) and dose(s) confirmed: yes    Refill Questions      Flowsheet Row Most Recent Value   Changes to allergies? No   Changes to medications? No   New conditions or infections since last clinic visit No   Unplanned office visit, urgent care, ED, or hospital admission in the last 4 weeks  No   How does patient/caregiver feel medication is working? Good   Financial problems or insurance changes  No   If yes, describe changes in insurance or financial issues. N/A   Since the previous refill, were any specialty medication doses or scheduled injections missed or delayed?  No   If yes, please provide the amount N/A   Why were doses missed? N/A   Does this patient require a clinical escalation to a pharmacist? No            Delivery Questions      Flowsheet Row Most Recent Value   Delivery method [employer]   Delivery address verified with patient/caregiver? Yes   Delivery address Home   Number of medications in delivery 1   Medication(s) being filled and delivered Galcanezumab-Gnlm   Doses left of specialty medications N/A   Copay verified? Yes   Copay amount N/A   Copay form of payment No copayment ($0)   Ship Date 07/22   Delivery Date 07/23   Signature Required No              Medication Adherence    Adherence tools used: calendar  Support network for adherence: healthcare provider   Other support network: Pharmacy             Follow-up: 28 day(s)     Beulah Ortiz  Specialty Pharmacy Technician

## 2024-07-25 DIAGNOSIS — K21.9 GASTROESOPHAGEAL REFLUX DISEASE WITHOUT ESOPHAGITIS: ICD-10-CM

## 2024-07-25 RX ORDER — FAMOTIDINE 20 MG/1
20 TABLET, FILM COATED ORAL 2 TIMES DAILY
Qty: 180 TABLET | Refills: 2 | Status: SHIPPED | OUTPATIENT
Start: 2024-07-25

## 2024-08-13 ENCOUNTER — SPECIALTY PHARMACY (OUTPATIENT)
Dept: PHARMACY | Facility: HOSPITAL | Age: 59
End: 2024-08-13
Payer: COMMERCIAL

## 2024-08-13 NOTE — PROGRESS NOTES
Specialty Pharmacy Refill Coordination Note     Liliana is a 58 y.o. female contacted today regarding refills of Emgality specialty medication(s).Patient is due for injection on 08/26.    Reviewed and verified with patient:       Specialty medication(s) and dose(s) confirmed: yes    Refill Questions      Flowsheet Row Most Recent Value   Changes to allergies? No   Changes to medications? No   New conditions or infections since last clinic visit No   Unplanned office visit, urgent care, ED, or hospital admission in the last 4 weeks  No   How does patient/caregiver feel medication is working? Good   Financial problems or insurance changes  No   If yes, describe changes in insurance or financial issues. N/A   Since the previous refill, were any specialty medication doses or scheduled injections missed or delayed?  No   If yes, please provide the amount N/A   Why were doses missed? /NA   Does this patient require a clinical escalation to a pharmacist? No            Delivery Questions      Flowsheet Row Most Recent Value   Delivery method [employer]Ex   Delivery address verified with patient/caregiver? Yes   Delivery address Home   Number of medications in delivery 1   Medication(s) being filled and delivered Galcanezumab-Gnlm   Doses left of specialty medications N/A   Copay verified? Yes   Copay amount Emgality =$0   Copay form of payment No copayment ($0)   Ship Date 08/13   Delivery Date 08/14   Signature Required No              Medication Adherence    Adherence tools used: calendar  Support network for adherence: healthcare provider   Other support network: Pharmacy             Follow-up: 28 day(s)     Beulah Ortiz  Specialty Pharmacy Technician

## 2024-08-13 NOTE — PROGRESS NOTES
"   Specialty Pharmacy Patient Management Program  Neurology Reassessment     Liliana Quevedo is a 58 y.o. female with migraines seen by a Neurology provider and enrolled in the Neurology Patient Management program offered by [facility].  A follow-up outreach was conducted, including assessment of continued therapy appropriateness, medication adherence, and side effect incidence and management for Emgality.     Changes to Insurance Coverage or Financial Support  No changes     Relevant Past Medical History and Comorbidities  Relevant medical history and concomitant health conditions were discussed with the patient. The patient's chart has been reviewed for relevant past medical history and comorbid health conditions and updated as necessary.   Past Medical History:   Diagnosis Date    Acute sinusitis     Assessed By: Geremias Ramesh (Internal Medicine); Last Assessed: 23 Jan 2015    Allergic rhinitis     Carpal tunnel syndrome     Colon polyp     SIGMOID    DDD (degenerative disc disease), lumbar     De Quervain's tenosynovitis     Depression     Edema     Assessed By: Geremias Ramesh (Internal Medicine); Last Assessed: 26 Nov 2014    Elevated alkaline phosphatase level     102    Fatigue     Gastric ulcer     GERD (gastroesophageal reflux disease)     on Protonix.  Says sx's not bad even if misses meds, \"depends on what I eat\".  EGD GDW 6/16 40 cm, path DE reflux.  serum h. pyl neg    Hematuria     Hyperlipidemia     Hypertension     per prim MD note    Hypothyroidism     w/ hx thyromegaly s/p CASTILLO.     Incomplete right bundle branch block     Low back pain     Migraine     Mild cardiomegaly     Morbid obesity     Near syncope 06/26/2018    Nephrolithiasis     Osteoarthritis     Peripheral edema     Polyp of sigmoid colon     Scoliosis     Sleep apnea     CPAP compliant    Tendinitis     Viral URI     Assessed By: Geremias Ramesh (Internal Medicine); Last Assessed: 23 Jan 2015    Vitamin D deficiency  "    Weight gain     Assessed By: Geremias Ramesh (Internal Medicine); Last Assessed: 26 Nov 2014     Social History     Socioeconomic History    Marital status:    Tobacco Use    Smoking status: Never     Passive exposure: Never    Smokeless tobacco: Never   Vaping Use    Vaping status: Never Used   Substance and Sexual Activity    Alcohol use: No    Drug use: No    Sexual activity: Not Currently     Partners: Male     Birth control/protection: Hysterectomy     Problem list reviewed by Susan Turcios, PharmD on 8/13/2024 at  9:35 AM    Hospitalizations and Urgent Care Since Last Assessment  ED Visits, Admissions, or Hospitalizations: none   Urgent Office Visits: none     Allergies  Known allergies and reactions were discussed with the patient. The patient's chart has been reviewed for allergy information and updated as necessary.   Allergies   Allergen Reactions    Ampicillin Hives    Bactrim [Sulfamethoxazole-Trimethoprim] Hives and GI Intolerance    Sulfa Antibiotics Hives     Hot and cold flashes, N&V, diarrhea     Allergies reviewed by Susan Turcios, PharmD on 8/13/2024 at  9:35 AM    Relevant Laboratory Values  Common labs          9/11/2023    12:31   Common Labs   Glucose 80    BUN 13    Creatinine 1.00    Sodium 139    Potassium 4.6    Chloride 102    Calcium 9.9    Albumin 4.4    Total Bilirubin 0.4    Alkaline Phosphatase 92    AST (SGOT) 22    ALT (SGPT) 15    WBC 4.73    Hemoglobin 14.3    Hematocrit 43.0    Platelets 234    Total Cholesterol 202    Triglycerides 120    HDL Cholesterol 49    LDL Cholesterol  131        Lab Assessment  N/A  Current Medication List  This medication list has been reviewed with the patient and evaluated for any interactions or necessary modifications/recommendations, and updated to include all prescription medications, OTC medications, and supplements the patient is currently taking.  This list reflects what is contained in the patient's profile, which has also been  marked as reviewed to communicate to other providers it is the most up to date version of the patient's current medication therapy.     Current Outpatient Medications:     azithromycin (Zithromax Z-Doroteo) 250 MG tablet, Take 2 tablets by mouth on day 1, then 1 tablet daily on days 2-5, Disp: 6 tablet, Rfl: 0    citalopram (CeleXA) 40 MG tablet, TAKE ONE TABLET BY MOUTH DAILY, Disp: 90 tablet, Rfl: 3    famotidine (PEPCID) 20 MG tablet, TAKE 1 TABLET BY MOUTH 2 TIMES A DAY, Disp: 180 tablet, Rfl: 2    galcanezumab-gnlm (EMGALITY) 120 MG/ML auto-injector pen, Inject 1 mL under the skin into the appropriate area as directed Every 28 (Twenty-Eight) Days., Disp: 1 mL, Rfl: 11    ibandronate (BONIVA) 150 MG tablet, ibandronate 150 mg tablet  Take 1 tablet every month by oral route for 90 days., Disp: , Rfl:     levocetirizine (XYZAL) 5 MG tablet, TAKE ONE TABLET BY MOUTH EVERY EVENING, Disp: 90 tablet, Rfl: 1    levothyroxine (SYNTHROID, LEVOTHROID) 137 MCG tablet, Take 1 tablet by mouth Daily., Disp: 90 tablet, Rfl: 3    montelukast (SINGULAIR) 10 MG tablet, TAKE ONE TABLET BY MOUTH ONCE NIGHTLY, Disp: 90 tablet, Rfl: 1    pantoprazole (PROTONIX) 40 MG EC tablet, TAKE ONE TABLET BY MOUTH DAILY, Disp: 90 tablet, Rfl: 2    Medicines reviewed by Susan Turcios, PharmD on 8/13/2024 at  9:35 AM    Drug Interactions  none     Adverse Drug Reactions  Medication tolerability: Tolerating with no to minimal ADRs          Medication plan: Continue therapy with normal follow-up  Plan for ADR Management: not required      Adherence, Self-Administration, and Current Therapy Problems  Adherence related patient's specialty therapy was discussed with the patient. The Adherence segment of this outreach has been reviewed and updated.   Adherence Questions  Linked Medication(s) Assessed: Galcanezumab-Gnlm  On average, how many doses/injections does the patient miss per month?: 0  What are the identified reasons for non-adherence or missed  doses? : no problems identified  What is the estimated medication adherence level?: %  Based on the patient/caregiver response and refill history, does this patient require an MTP to track adherence improvements?: no    Additional Barriers to Patient Self-Administration: none  Methods for Supporting Patient Self-Administration: pt adept with Emgality self-injections.    Recently Close Medication Therapy Problems  No medication therapy recommendations to display  Open Medication Therapy Problems  No medication therapy recommendations to display     Goals of Therapy  Goals related to the patient's specialty therapy was discussed with the patient. The Patient Goals segment of this outreach has been reviewed and updated.    Goals Addressed Today        Specialty Pharmacy General Goal      Decrease intensity and frequency of migraines.  4/6/23 - Headaches improving with current treatment - HA frequency is once every two months.  Last for few minutes.   Aborts with Maxalt.                   Quality of Life Assessment   Quality of Life related to the patient's enrollment in the patient management program and services provided was discussed with the patient. The QOL segment of this outreach has been reviewed and updated.   Quality of Life Improvement Scale: 8-Moderately better    Reassessment Plan & Follow-Up  Medication Therapy Changes: Continue Emgality 120mg subq monthly  Related Plans, Therapy Recommendations, or Issues to Be Addressed: none  Pharmacist to perform regular reassessments no more than (6) months from the previous assessment.  Care Coordinator to set up future refill outreaches, coordinate prescription delivery, and escalate clinical questions to pharmacist.     Attestation  Therapeutic appropriateness: Appropriate  I attest the patient was actively involved in and has agreed to the above plan of care. If the prescribed therapy is at any point deemed not appropriate based on the current or future  assessments, a consultation will be initiated with the patient's specialty care provider to determine the best course of action. The revised plan of therapy will be documented along with any additional patient education provided. Discussed aforementioned material with patient by phone.    Mariana PriceD, [facility] Specialty Pharmacist, Neurology  8/13/2024  09:36 EDT

## 2024-08-25 DIAGNOSIS — K21.9 GASTROESOPHAGEAL REFLUX DISEASE WITHOUT ESOPHAGITIS: ICD-10-CM

## 2024-08-26 RX ORDER — PANTOPRAZOLE SODIUM 40 MG/1
40 TABLET, DELAYED RELEASE ORAL DAILY
Qty: 90 TABLET | Refills: 2 | Status: SHIPPED | OUTPATIENT
Start: 2024-08-26

## 2024-09-05 ENCOUNTER — SPECIALTY PHARMACY (OUTPATIENT)
Dept: ONCOLOGY | Facility: HOSPITAL | Age: 59
End: 2024-09-05
Payer: COMMERCIAL

## 2024-09-05 DIAGNOSIS — E03.9 HYPOTHYROIDISM, UNSPECIFIED TYPE: ICD-10-CM

## 2024-09-05 RX ORDER — LEVOTHYROXINE SODIUM 137 UG/1
137 TABLET ORAL DAILY
Qty: 90 TABLET | Refills: 3 | Status: SHIPPED | OUTPATIENT
Start: 2024-09-05

## 2024-09-05 NOTE — TELEPHONE ENCOUNTER
Rx Refill Note  Requested Prescriptions     Pending Prescriptions Disp Refills    levothyroxine (SYNTHROID, LEVOTHROID) 137 MCG tablet [Pharmacy Med Name: LEVOTHYROXINE 137 MCG TABLET] 90 tablet 3     Sig: TAKE 1 TABLET BY MOUTH DAILY      Last office visit with prescribing clinician: 7/3/2024   Next office visit with prescribing clinician: 7/3/2025                           Dania Burris MA  09/05/24, 10:23 EDT

## 2024-09-05 NOTE — PROGRESS NOTES
Specialty Pharmacy Refill Coordination Note     Liliana is a 58 y.o. female contacted today regarding refills of Emgality specialty medication(s).Patient is due for injection on 09/26.    Reviewed and verified with patient:       Specialty medication(s) and dose(s) confirmed: yes    Refill Questions      Flowsheet Row Most Recent Value   Changes to allergies? No   Changes to medications? No   New conditions or infections since last clinic visit No   Unplanned office visit, urgent care, ED, or hospital admission in the last 4 weeks  No   How does patient/caregiver feel medication is working? Good   Financial problems or insurance changes  No   If yes, describe changes in insurance or financial issues. N/A   Since the previous refill, were any specialty medication doses or scheduled injections missed or delayed?  No   If yes, please provide the amount N/A   Why were doses missed? N/A   Does this patient require a clinical escalation to a pharmacist? No            Delivery Questions      Flowsheet Row Most Recent Value   Delivery method [employer]   Delivery address verified with patient/caregiver? Yes   Delivery address Home   Number of medications in delivery 1   Medication(s) being filled and delivered Galcanezumab-Gnlm   Doses left of specialty medications N/A   Copay verified? Yes   Copay amount Emgality = $0   Copay form of payment No copayment ($0)   Ship Date 09/9   Delivery Date 09/10   Signature Required No              Medication Adherence    Adherence tools used: calendar  Support network for adherence: healthcare provider   Other support network: Pharmacy             Follow-up: 28 day(s)     Beulah Ortiz  Specialty Pharmacy Technician

## 2024-10-05 DIAGNOSIS — F33.0 MILD EPISODE OF RECURRENT MAJOR DEPRESSIVE DISORDER: ICD-10-CM

## 2024-10-07 RX ORDER — CITALOPRAM HYDROBROMIDE 40 MG/1
40 TABLET ORAL DAILY
Qty: 90 TABLET | Refills: 1 | Status: SHIPPED | OUTPATIENT
Start: 2024-10-07

## 2024-10-09 ENCOUNTER — SPECIALTY PHARMACY (OUTPATIENT)
Dept: ONCOLOGY | Facility: HOSPITAL | Age: 59
End: 2024-10-09
Payer: COMMERCIAL

## 2024-10-09 NOTE — PROGRESS NOTES
Specialty Pharmacy Refill Coordination Note     Liliana is a 58 y.o. female contacted today regarding refills of Emgality specialty medication(s).Patient is due for injection on 10/22.    Reviewed and verified with patient:       Specialty medication(s) and dose(s) confirmed: yes    Refill Questions      Flowsheet Row Most Recent Value   Changes to allergies? No   Changes to medications? No   New conditions or infections since last clinic visit No   Unplanned office visit, urgent care, ED, or hospital admission in the last 4 weeks  No   How does patient/caregiver feel medication is working? Good   Financial problems or insurance changes  No   If yes, describe changes in insurance or financial issues. N/A   Since the previous refill, were any specialty medication doses or scheduled injections missed or delayed?  No   If yes, please provide the amount N/A   Why were doses missed? N/A   Does this patient require a clinical escalation to a pharmacist? No            Delivery Questions      Flowsheet Row Most Recent Value   Delivery method UPS   Delivery address verified with patient/caregiver? Yes   Delivery address Home   Number of medications in delivery 1   Medication(s) being filled and delivered Galcanezumab-Gnlm   Doses left of specialty medications N/A   Copay verified? Yes   Copay amount Emgality =$0   Copay form of payment No copayment ($0)   Ship Date 10/10   Delivery Date 10/11   Signature Required No              Medication Adherence    Adherence tools used: calendar  Support network for adherence: healthcare provider   Other support network: Pharmacy             Follow-up: 28 day(s)     Beulah Ortiz  Specialty Pharmacy Technician

## 2024-10-21 ENCOUNTER — SPECIALTY PHARMACY (OUTPATIENT)
Dept: ONCOLOGY | Facility: HOSPITAL | Age: 59
End: 2024-10-21
Payer: COMMERCIAL

## 2024-10-31 ENCOUNTER — SPECIALTY PHARMACY (OUTPATIENT)
Dept: ONCOLOGY | Facility: HOSPITAL | Age: 59
End: 2024-10-31
Payer: COMMERCIAL

## 2024-10-31 NOTE — PROGRESS NOTES
Specialty Pharmacy Refill Coordination Note     Liliana is a 59 y.o. female contacted today regarding refills of Emgality specialty medication(s).Patient is due for injection on 11/20.    Reviewed and verified with patient:       Specialty medication(s) and dose(s) confirmed: yes    Refill Questions      Flowsheet Row Most Recent Value   Changes to allergies? No   Changes to medications? No   New conditions or infections since last clinic visit No   Unplanned office visit, urgent care, ED, or hospital admission in the last 4 weeks  No   How does patient/caregiver feel medication is working? Good   Financial problems or insurance changes  No   If yes, describe changes in insurance or financial issues. N/A   Since the previous refill, were any specialty medication doses or scheduled injections missed or delayed?  No   If yes, please provide the amount N/A   Why were doses missed? N/A   Does this patient require a clinical escalation to a pharmacist? No            Delivery Questions      Flowsheet Row Most Recent Value   Delivery method UPS   Delivery address verified with patient/caregiver? Yes   Delivery address Home   Number of medications in delivery 1   Medication(s) being filled and delivered Galcanezumab-gnlm (EMGALITY)   Doses left of specialty medications N/A   Copay verified? Yes   Copay amount Emgality =$0   Copay form of payment No copayment ($0)   Ship Date 11/04   Delivery Date 11/05   Signature Required No              Medication Adherence    Adherence tools used: calendar  Support network for adherence: healthcare provider   Other support network: Pharmacy             Follow-up: 28 day(s)     Beulah Ortiz  Specialty Pharmacy Technician

## 2024-11-06 ENCOUNTER — OFFICE VISIT (OUTPATIENT)
Dept: INTERNAL MEDICINE | Facility: CLINIC | Age: 59
End: 2024-11-06
Payer: COMMERCIAL

## 2024-11-06 VITALS
HEART RATE: 88 BPM | HEIGHT: 63 IN | WEIGHT: 239.4 LBS | TEMPERATURE: 97.3 F | SYSTOLIC BLOOD PRESSURE: 118 MMHG | DIASTOLIC BLOOD PRESSURE: 58 MMHG | BODY MASS INDEX: 42.42 KG/M2 | RESPIRATION RATE: 16 BRPM

## 2024-11-06 DIAGNOSIS — J01.90 ACUTE SINUSITIS, RECURRENCE NOT SPECIFIED, UNSPECIFIED LOCATION: ICD-10-CM

## 2024-11-06 DIAGNOSIS — R09.81 NASAL CONGESTION: Primary | ICD-10-CM

## 2024-11-06 LAB
EXPIRATION DATE: NORMAL
FLUAV AG UPPER RESP QL IA.RAPID: NOT DETECTED
FLUBV AG UPPER RESP QL IA.RAPID: NOT DETECTED
INTERNAL CONTROL: NORMAL
Lab: NORMAL
SARS-COV-2 AG UPPER RESP QL IA.RAPID: NOT DETECTED

## 2024-11-06 PROCEDURE — 87428 SARSCOV & INF VIR A&B AG IA: CPT | Performed by: NURSE PRACTITIONER

## 2024-11-06 PROCEDURE — 99213 OFFICE O/P EST LOW 20 MIN: CPT | Performed by: NURSE PRACTITIONER

## 2024-11-06 RX ORDER — METHYLPREDNISOLONE 4 MG/1
TABLET ORAL
Qty: 21 TABLET | Refills: 0 | Status: SHIPPED | OUTPATIENT
Start: 2024-11-06

## 2024-11-06 RX ORDER — DOXYCYCLINE 100 MG/1
100 CAPSULE ORAL 2 TIMES DAILY
Qty: 20 CAPSULE | Refills: 0 | Status: SHIPPED | OUTPATIENT
Start: 2024-11-06

## 2024-11-07 NOTE — PROGRESS NOTES
Patient Name: Liliana Quevedo  : 1965   MRN: [id number]     Chief Complaint:    Chief Complaint   Patient presents with    sneezing     Nasal Congestion       History of Present Illness: Liliana Quevedo is a 59 y.o. female.  History of Present Illness  The patient presents for evaluation of sneezing, itchy and watery eyes, and an itchy throat.    She began experiencing these symptoms yesterday, which have progressively worsened today. Last week, she was sneezing and had itchy, watery eyes, along with an itchy throat. She has been taking allergy medication but does not use any allergy eye drops. She reports a slight sinus pain from blowing her nose.    She uses a CPAP machine at night. She has not had any fevers or chills. She is not experiencing any wheezing or shortness of breath. She also reports no nausea, vomiting, or diarrhea. Additionally, no one else around her is ill.         Subjective     Review of System: Review of Systems     Medications:     Current Outpatient Medications:     citalopram (CeleXA) 40 MG tablet, TAKE 1 TABLET BY MOUTH DAILY, Disp: 90 tablet, Rfl: 1    famotidine (PEPCID) 20 MG tablet, TAKE 1 TABLET BY MOUTH 2 TIMES A DAY, Disp: 180 tablet, Rfl: 2    galcanezumab-gnlm (EMGALITY) 120 MG/ML auto-injector pen, Inject 1 mL under the skin into the appropriate area as directed Every 28 (Twenty-Eight) Days., Disp: 1 mL, Rfl: 11    ibandronate (BONIVA) 150 MG tablet, ibandronate 150 mg tablet  Take 1 tablet every month by oral route for 90 days., Disp: , Rfl:     levocetirizine (XYZAL) 5 MG tablet, TAKE ONE TABLET BY MOUTH EVERY EVENING, Disp: 90 tablet, Rfl: 1    levothyroxine (SYNTHROID, LEVOTHROID) 137 MCG tablet, TAKE 1 TABLET BY MOUTH DAILY, Disp: 90 tablet, Rfl: 3    montelukast (SINGULAIR) 10 MG tablet, TAKE ONE TABLET BY MOUTH ONCE NIGHTLY, Disp: 90 tablet, Rfl: 1    pantoprazole (PROTONIX) 40 MG EC tablet, TAKE 1 TABLET BY MOUTH DAILY, Disp: 90 tablet, Rfl: 2    doxycycline  "(VIBRAMYCIN) 100 MG capsule, Take 1 capsule by mouth 2 (Two) Times a Day., Disp: 20 capsule, Rfl: 0    methylPREDNISolone (MEDROL) 4 MG dose pack, Take as directed on package instructions., Disp: 21 tablet, Rfl: 0    Allergies:   Allergies   Allergen Reactions    Ampicillin Hives    Bactrim [Sulfamethoxazole-Trimethoprim] Hives and GI Intolerance    Sulfa Antibiotics Hives     Hot and cold flashes, N&V, diarrhea       Objective     Physical Exam:   Vital Signs:   Vitals:    11/06/24 1414   BP: 118/58   BP Location: Right arm   Patient Position: Sitting   Cuff Size: Adult   Pulse: 88   Resp: 16   Temp: 97.3 °F (36.3 °C)   TempSrc: Infrared   Weight: 109 kg (239 lb 6.4 oz)   Height: 160 cm (63\")   PainSc: 0-No pain           Physical Exam  Physical Exam  Patient appears ill and hoarse.  Tympanic membranes are normal. Pharynx is clear.  No cervical adenopathy in the neck.  Lung's breath sounds are clear and equal.  Heart rate is regular.       Results  Laboratory Studies  Covid and flu tests were negative.     Assessment / Plan      Assessment/Plan:   Diagnoses and all orders for this visit:    1. Nasal congestion (Primary)  -     POCT SARS-CoV-2 + Flu Antigen VASILIY    2. Acute sinusitis, recurrence not specified, unspecified location  -     doxycycline (VIBRAMYCIN) 100 MG capsule; Take 1 capsule by mouth 2 (Two) Times a Day.  Dispense: 20 capsule; Refill: 0  -     methylPREDNISolone (MEDROL) 4 MG dose pack; Take as directed on package instructions.  Dispense: 21 tablet; Refill: 0       Assessment & Plan  1. Acute sinusitis.  Symptoms include sneezing, itchy and watery eyes, itchy throat, and drainage in the throat. No fever, chills, wheezing, shortness of breath, nausea, vomiting, or diarrhea reported. Physical examination shows no cervical adenopathy, clear pharynx, and clear breath sounds. Antibiotics and steroids will be administered. Steroids should be taken with food. Drink plenty of fluids. Avoid lying down for 30 " minutes after taking doxycycline and do not take it with vitamins.             Follow Up:   prn  Patient or patient representative verbalized consent for the use of Ambient Listening during the visit with  CASE Vance for chart documentation. 11/7/2024  18:29 EST    CASE Vance  [facility]

## 2024-11-13 ENCOUNTER — HOSPITAL ENCOUNTER (OUTPATIENT)
Dept: GENERAL RADIOLOGY | Facility: HOSPITAL | Age: 59
Discharge: HOME | End: 2024-11-13
Admitting: INTERNAL MEDICINE
Payer: COMMERCIAL

## 2024-11-13 ENCOUNTER — OFFICE VISIT (OUTPATIENT)
Dept: INTERNAL MEDICINE | Facility: CLINIC | Age: 59
End: 2024-11-13
Payer: COMMERCIAL

## 2024-11-13 VITALS
TEMPERATURE: 98.1 F | BODY MASS INDEX: 42.13 KG/M2 | SYSTOLIC BLOOD PRESSURE: 126 MMHG | OXYGEN SATURATION: 98 % | HEIGHT: 63 IN | WEIGHT: 237.8 LBS | HEART RATE: 75 BPM | DIASTOLIC BLOOD PRESSURE: 84 MMHG | RESPIRATION RATE: 20 BRPM

## 2024-11-13 DIAGNOSIS — R07.81 RIB PAIN ON LEFT SIDE: ICD-10-CM

## 2024-11-13 DIAGNOSIS — R07.81 RIB PAIN ON LEFT SIDE: Primary | ICD-10-CM

## 2024-11-13 PROCEDURE — 99213 OFFICE O/P EST LOW 20 MIN: CPT | Performed by: INTERNAL MEDICINE

## 2024-11-13 PROCEDURE — 71101 X-RAY EXAM UNILAT RIBS/CHEST: CPT

## 2024-11-13 NOTE — PROGRESS NOTES
Subjective       Liliana Quevedo is a 59 y.o. female.     Chief Complaint   Patient presents with    Cough    Rib Pain       History obtained from the patient.    The patient saw Kendy WILLOUGHBY on 11/6/2024.  Influenza and COVID-19 swabs were negative.  She was diagnosed with Acute Sinusitis.  Doxycycline and Medrol Dosepak were prescribed.    Cough  This is a new problem. Episode onset: 11/1/2024. The problem has been worse. The cough is Dry (occasionally productive of sputum). Associated symptoms include ear congestion and nasal congestion. Pertinent negatives include no chest pain, chills, ear pain, fever, headaches, hemoptysis, myalgias, postnasal drip, rash, rhinorrhea, sore throat, shortness of breath or wheezing. Associated symptoms comments: Left anterior rib pain x 3 days, worse with cough. Nothing aggravates the symptoms. Risk factors: None. Treatments tried: On Xyzal and Singulair daily.  Takes Tylenol as needed.  Cannot take NSAIDs. The treatment provided mild relief. Her past medical history is significant for environmental allergies. There is no history of asthma or COPD. Non-smoker      There is no known exposure to Influenza, COVID-19, or RSV.  She does work in a school cafeteria.    Of note, the patient has Osteoporosis.    The following portions of the patient's history were reviewed and updated as appropriate: allergies, current medications, past family history, past medical history, past social history, past surgical history, and problem list.      Review of Systems   Constitutional:  Negative for chills, fatigue and fever.   HENT:  Positive for congestion. Negative for ear pain, postnasal drip, rhinorrhea and sore throat.    Respiratory:  Positive for cough. Negative for hemoptysis, shortness of breath and wheezing.    Cardiovascular:  Negative for chest pain.   Gastrointestinal:  Negative for abdominal pain, diarrhea, nausea and vomiting.   Musculoskeletal:  Negative for arthralgias and  "myalgias.   Skin:  Negative for rash.   Allergic/Immunologic: Positive for environmental allergies.   Neurological:  Negative for headaches.   Hematological:  Negative for adenopathy.           Objective     Blood pressure 126/84, pulse 75, temperature 98.1 °F (36.7 °C), resp. rate 20, height 160 cm (62.99\"), weight 108 kg (237 lb 12.8 oz), SpO2 98%, not currently breastfeeding.    Physical Exam  Vitals and nursing note reviewed.   Constitutional:       Comments: BMI greater than 40   Cardiovascular:      Rate and Rhythm: Normal rate and regular rhythm.      Heart sounds: Normal heart sounds. No murmur heard.  Pulmonary:      Effort: Pulmonary effort is normal.      Breath sounds: Normal breath sounds.      Comments: There is tenderness to palpation of the left lower anterior rib.  No bruising or edema.  Skin:     Findings: No rash.   Neurological:      Mental Status: She is alert.   Psychiatric:         Mood and Affect: Mood normal.           Assessment & Plan   Diagnoses and all orders for this visit:    1. Rib pain on left side (Primary)  -     XR Ribs Left With PA Chest; Future     Continue Tylenol.      Return if symptoms worsen or fail to improve.           "

## 2024-11-26 ENCOUNTER — SPECIALTY PHARMACY (OUTPATIENT)
Dept: ONCOLOGY | Facility: HOSPITAL | Age: 59
End: 2024-11-26
Payer: COMMERCIAL

## 2024-11-26 NOTE — PROGRESS NOTES
Specialty Pharmacy Refill Coordination Note     Liliana is a 59 y.o. female contacted today regarding refills of Emgality specialty medication(s).Patient is due for injection on 12/18.    Reviewed and verified with patient:       Specialty medication(s) and dose(s) confirmed: yes    Refill Questions      Flowsheet Row Most Recent Value   Changes to allergies? No   Changes to medications? No   New conditions or infections since last clinic visit No   Unplanned office visit, urgent care, ED, or hospital admission in the last 4 weeks  No   How does patient/caregiver feel medication is working? Good   Financial problems or insurance changes  No   If yes, describe changes in insurance or financial issues. N/A   Since the previous refill, were any specialty medication doses or scheduled injections missed or delayed?  No   If yes, please provide the amount N/A   Why were doses missed? N/A   Does this patient require a clinical escalation to a pharmacist? No            Delivery Questions      Flowsheet Row Most Recent Value   Delivery method UPS   Delivery address verified with patient/caregiver? Yes   Delivery address Home   Number of medications in delivery 1   Medication(s) being filled and delivered Galcanezumab-gnlm (EMGALITY)   Doses left of specialty medications N/A   Copay verified? Yes   Copay amount Emgality =$0   Copay form of payment No copayment ($0)   Ship Date 11/26   Delivery Date 11/27   Signature Required No              Medication Adherence    Adherence tools used: calendar  Support network for adherence: healthcare provider   Other support network: Pharmacy             Follow-up: 28 day(s)     Beulah Ortiz  Specialty Pharmacy Technician

## 2024-11-27 DIAGNOSIS — J30.2 SEASONAL ALLERGIES: ICD-10-CM

## 2024-12-02 RX ORDER — LEVOCETIRIZINE DIHYDROCHLORIDE 5 MG/1
TABLET, FILM COATED ORAL
Qty: 90 TABLET | Refills: 1 | Status: SHIPPED | OUTPATIENT
Start: 2024-12-02

## 2024-12-19 ENCOUNTER — SPECIALTY PHARMACY (OUTPATIENT)
Dept: ONCOLOGY | Facility: HOSPITAL | Age: 59
End: 2024-12-19
Payer: COMMERCIAL

## 2025-01-14 ENCOUNTER — SPECIALTY PHARMACY (OUTPATIENT)
Dept: ONCOLOGY | Facility: HOSPITAL | Age: 60
End: 2025-01-14
Payer: COMMERCIAL

## 2025-01-14 RX ORDER — RIZATRIPTAN BENZOATE 10 MG/1
10 TABLET ORAL ONCE AS NEEDED
COMMUNITY
Start: 2025-01-04

## 2025-01-14 NOTE — PROGRESS NOTES
Specialty Pharmacy Refill Coordination Note     Liliana is a 59 y.o. female contacted today regarding refills of her specialty medication(s).    Specialty medication(s) and dose(s) confirmed: emgality 120 mg/mL  Changes to medications: no  Changes to insurance: no, but copay card is now 35$  Reviewed and verified with patient:  Allergies  Meds  Problems         Refill Questions      Flowsheet Row Most Recent Value   Changes to allergies? No   Changes to medications? No   New conditions or infections since last clinic visit No   Unplanned office visit, urgent care, ED, or hospital admission in the last 4 weeks  No   How does patient/caregiver feel medication is working? Very good   Financial problems or insurance changes  No   If yes, describe changes in insurance or financial issues. copay card for emgality is 35$   Since the previous refill, were any specialty medication doses or scheduled injections missed or delayed?  No   If yes, please provide the amount due the last Saturday of the month   Does this patient require a clinical escalation to a pharmacist? No            Delivery Questions      Flowsheet Row Most Recent Value   Delivery method UPS   Delivery address verified with patient/caregiver? Yes   Delivery address Home   Number of medications in delivery 1   Medication(s) being filled and delivered Galcanezumab-gnlm (EMGALITY)   Doses left of specialty medications due on last Saturday of the month   Copay verified? Yes   Copay amount 35$ with emgality copay card   Copay form of payment Credit/debit on file   Ship Date 1/14   Delivery Date 1/15   Signature Required No            Medication Adherence    Adherence tools used: calendar  Support network for adherence: healthcare provider   Other support network: Pharmacy             Follow-up: 4 week(s)     Vic Arvizu, PharmD  1/14/2025   11:41 EST

## 2025-01-14 NOTE — PROGRESS NOTES
"   Specialty Pharmacy Patient Management Program  Neurology Reassessment     Liliana Quevedo is a 59 y.o. female with migraines seen by a Neurology provider and enrolled in the Neurology Patient Management program offered by [facility].  A follow-up outreach was conducted, including assessment of continued therapy appropriateness, medication adherence, and side effect incidence and management for emgality 120 mg/mL.     Changes to Insurance Coverage or Financial Support  No changes, anthem [employer] [facility], with copay card     Relevant Past Medical History and Comorbidities  Relevant medical history and concomitant health conditions were discussed with the patient. The patient's chart has been reviewed for relevant past medical history and comorbid health conditions and updated as necessary.   Past Medical History:   Diagnosis Date    Acute sinusitis     Assessed By: Geremias Ramesh (Internal Medicine); Last Assessed: 23 Jan 2015    Allergic rhinitis     Carpal tunnel syndrome     Colon polyp     SIGMOID    DDD (degenerative disc disease), lumbar     De Quervain's tenosynovitis     Depression     Edema     Assessed By: Geremias Ramesh (Internal Medicine); Last Assessed: 26 Nov 2014    Elevated alkaline phosphatase level     102    Fatigue     Gastric ulcer     GERD (gastroesophageal reflux disease)     on Protonix.  Says sx's not bad even if misses meds, \"depends on what I eat\".  EGD [facility] 6/16 40 cm, path DE reflux.  serum h. pyl neg    Hematuria     Hyperlipidemia     Hypertension     per prim MD note    Hypothyroidism     w/ hx thyromegaly s/p CASTILLO.     Incomplete right bundle branch block     Low back pain     Migraine     Mild cardiomegaly     Morbid obesity     Near syncope 06/26/2018    Nephrolithiasis     Osteoarthritis     Peripheral edema     Polyp of sigmoid colon     Scoliosis     Sleep apnea     CPAP compliant    Tendinitis     Viral URI     Assessed By: Geremias Ramesh (Internal " Medicine); Last Assessed: 23 Jan 2015    Vitamin D deficiency     Weight gain     Assessed By: Geremias Ramesh (Internal Medicine); Last Assessed: 26 Nov 2014     Social History     Socioeconomic History    Marital status:    Tobacco Use    Smoking status: Never     Passive exposure: Never    Smokeless tobacco: Never   Vaping Use    Vaping status: Never Used   Substance and Sexual Activity    Alcohol use: No    Drug use: No    Sexual activity: Not Currently     Partners: Male     Birth control/protection: Hysterectomy     Problem list reviewed by Vic Arvizu, PharmD on 1/14/2025 at 11:29 AM    Hospitalizations and Urgent Care Since Last Assessment  ED Visits, Admissions, or Hospitalizations: none   Urgent Office Visits: none     Allergies  Known allergies and reactions were discussed with the patient. The patient's chart has been reviewed for allergy information and updated as necessary.   Allergies   Allergen Reactions    Ampicillin Hives    Bactrim [Sulfamethoxazole-Trimethoprim] Hives and GI Intolerance    Sulfa Antibiotics Hives     Hot and cold flashes, N&V, diarrhea     Allergies reviewed by Vic Arvizu, PharmD on 1/14/2025 at 11:27 AM    Relevant Laboratory Values      Lab Assessment        Current Medication List  This medication list has been reviewed with the patient and evaluated for any interactions or necessary modifications/recommendations, and updated to include all prescription medications, OTC medications, and supplements the patient is currently taking.  This list reflects what is contained in the patient's profile, which has also been marked as reviewed to communicate to other providers it is the most up to date version of the patient's current medication therapy.     Current Outpatient Medications:     citalopram (CeleXA) 40 MG tablet, TAKE 1 TABLET BY MOUTH DAILY, Disp: 90 tablet, Rfl: 1    famotidine (PEPCID) 20 MG tablet, TAKE 1 TABLET BY MOUTH 2 TIMES A DAY, Disp: 180  tablet, Rfl: 2    galcanezumab-gnlm (EMGALITY) 120 MG/ML auto-injector pen, Inject 1 mL under the skin into the appropriate area as directed Every 28 (Twenty-Eight) Days., Disp: 1 mL, Rfl: 11    ibandronate (BONIVA) 150 MG tablet, ibandronate 150 mg tablet  Take 1 tablet every month by oral route for 90 days., Disp: , Rfl:     levocetirizine (XYZAL) 5 MG tablet, TAKE ONE TABLET BY MOUTH EVERY EVENING, Disp: 90 tablet, Rfl: 1    levothyroxine (SYNTHROID, LEVOTHROID) 137 MCG tablet, TAKE 1 TABLET BY MOUTH DAILY, Disp: 90 tablet, Rfl: 3    montelukast (SINGULAIR) 10 MG tablet, TAKE ONE TABLET BY MOUTH ONCE NIGHTLY, Disp: 90 tablet, Rfl: 1    pantoprazole (PROTONIX) 40 MG EC tablet, TAKE 1 TABLET BY MOUTH DAILY, Disp: 90 tablet, Rfl: 2    rizatriptan (MAXALT) 10 MG tablet, Take 1 tablet by mouth 1 (One) Time As Needed for Migraine., Disp: , Rfl:     Medicines reviewed by Vic Arvizu, PharmD on 1/14/2025 at 11:29 AM    Drug Interactions  No relevant drug drug interactions with specialty medication.       Adverse Drug Reactions  Medication tolerability: Tolerating with no to minimal ADRs          Medication plan: Continue therapy with normal follow-up  Plan for ADR Management: patient to report      Adherence, Self-Administration, and Current Therapy Problems  Adherence related patient's specialty therapy was discussed with the patient. The Adherence segment of this outreach has been reviewed and updated.   Adherence Questions  Linked Medication(s) Assessed: Galcanezumab-gnlm (EMGALITY)  On average, how many doses/injections does the patient miss per month?: 0  What are the identified reasons for non-adherence or missed doses? : no problems identified  What is the estimated medication adherence level?: %  Based on the patient/caregiver response and refill history, does this patient require an MTP to track adherence improvements?: no    Additional Barriers to Patient Self-Administration: none  Methods for  Supporting Patient Self-Administration: n/a    Recently Close Medication Therapy Problems  No medication therapy recommendations to display  Open Medication Therapy Problems  No medication therapy recommendations to display     Goals of Therapy  Goals related to the patient's specialty therapy was discussed with the patient. The Patient Goals segment of this outreach has been reviewed and updated.    Goals Addressed Today        Specialty Pharmacy General Goal              Quality of Life Assessment   Quality of Life related to the patient's enrollment in the patient management program and services provided was discussed with the patient. The QOL segment of this outreach has been reviewed and updated.   Quality of Life Improvement Scale: 8-Moderately better    Reassessment Plan & Follow-Up  Medication Therapy Changes: n/a  Related Plans, Therapy Recommendations, or Issues to Be Addressed: n/a  Pharmacist to perform regular reassessments no more than (6) months from the previous assessment.  Care Coordinator to set up future refill outreaches, coordinate prescription delivery, and escalate clinical questions to pharmacist.     Attestation  Therapeutic appropriateness: Appropriate  I attest the patient was actively involved in and has agreed to the above plan of care. If the prescribed therapy is at any point deemed not appropriate based on the current or future assessments, a consultation will be initiated with the patient's specialty care provider to determine the best course of action. The revised plan of therapy will be documented along with any additional patient education provided. Discussed aforementioned material with patient via telemedicine.    Vic Arvizu, PharmD, [facility]  Clinic Specialty Pharmacist, Neurology  1/14/2025  11:42 EST

## 2025-01-31 ENCOUNTER — TRANSCRIBE ORDERS (OUTPATIENT)
Dept: ADMINISTRATIVE | Facility: HOSPITAL | Age: 60
End: 2025-01-31
Payer: COMMERCIAL

## 2025-01-31 DIAGNOSIS — Z12.31 SCREENING MAMMOGRAM FOR BREAST CANCER: Primary | ICD-10-CM

## 2025-02-09 LAB
NCCN CRITERIA FLAG: ABNORMAL
TYRER CUZICK SCORE: 4.7

## 2025-02-10 ENCOUNTER — DOCUMENTATION (OUTPATIENT)
Dept: GENETICS | Facility: HOSPITAL | Age: 60
End: 2025-02-10
Payer: COMMERCIAL

## 2025-02-12 ENCOUNTER — SPECIALTY PHARMACY (OUTPATIENT)
Dept: ONCOLOGY | Facility: HOSPITAL | Age: 60
End: 2025-02-12
Payer: COMMERCIAL

## 2025-02-12 ENCOUNTER — OFFICE VISIT (OUTPATIENT)
Dept: INTERNAL MEDICINE | Facility: CLINIC | Age: 60
End: 2025-02-12
Payer: COMMERCIAL

## 2025-02-12 VITALS
DIASTOLIC BLOOD PRESSURE: 62 MMHG | OXYGEN SATURATION: 95 % | SYSTOLIC BLOOD PRESSURE: 122 MMHG | HEIGHT: 63 IN | BODY MASS INDEX: 43.73 KG/M2 | TEMPERATURE: 98.1 F | WEIGHT: 246.8 LBS | HEART RATE: 84 BPM | RESPIRATION RATE: 20 BRPM

## 2025-02-12 DIAGNOSIS — R05.1 ACUTE COUGH: Primary | ICD-10-CM

## 2025-02-12 LAB
EXPIRATION DATE: ABNORMAL
FLUAV AG UPPER RESP QL IA.RAPID: DETECTED
FLUBV AG UPPER RESP QL IA.RAPID: NOT DETECTED
INTERNAL CONTROL: ABNORMAL
Lab: ABNORMAL
SARS-COV-2 AG UPPER RESP QL IA.RAPID: NOT DETECTED

## 2025-02-12 PROCEDURE — 99213 OFFICE O/P EST LOW 20 MIN: CPT | Performed by: INTERNAL MEDICINE

## 2025-02-12 PROCEDURE — 87428 SARSCOV & INF VIR A&B AG IA: CPT | Performed by: INTERNAL MEDICINE

## 2025-02-12 NOTE — PROGRESS NOTES
"Chief Complaint  Cough and Nasal Congestion    Subjective    Liliana Quevedo is a 59 y.o. female.     Liliana Quevedo presents to [facility] for     History of Present Illness  The patient presents for evaluation of influenza.    Her symptoms began on Sunday with a scratchy throat, which escalated to a dry cough by Monday. She has not experienced any fever or chills. She has been managing her symptoms with codeine cough syrup and Mucinex but reports no improvement in her condition. She has received the influenza vaccine.    MEDICATIONS  Current: Codeine cough syrup, Mucinex    IMMUNIZATIONS  She has received the influenza vaccine.       The following portions of the patient's history were reviewed and updated as appropriate: allergies, current medications, past family history, past medical history, past social history, past surgical history, and problem list.    Review of Systems   All other systems reviewed and are negative.      Objective   Body mass index is 43.73 kg/m².  Class 3 Severe Obesity (BMI >=40). Obesity-related health conditions include the following: none. Obesity is newly identified. BMI is is above average; BMI management plan is completed. We discussed portion control and increasing exercise.       Vital Signs:   /62   Pulse 84   Temp 98.1 °F (36.7 °C)   Resp 20   Ht 160 cm (62.99\")   Wt 112 kg (246 lb 12.8 oz)   SpO2 95%   BMI 43.73 kg/m²       Physical Exam  The patient is alert, oriented, and in no acute distress.  The head is normocephalic and atraumatic. Pupils are equal and round. Extraocular muscles are intact. Membranes are moist.  The chest is clear to auscultation with markedly wheeze.  The heart exam shows S1, S2. No murmurs, rubs, or gallop.       Results  Laboratory Studies  Tested positive for influenza A.            Assessment and Plan  Diagnoses and all orders for this visit:  Assessment & Plan  1. Influenza A.  She is " presenting with a 3-day history of viral upper respiratory infection (URI), minimal cough, and congestion. She tested positive for influenza A. Given that she has been vaccinated, her symptoms are milder. She is advised to continue with supportive care, advance diet as tolerated with emphasis on hydration, and use Tylenol or Motrin as needed for fever reduction. She should watch for any worsening respiratory symptoms, nausea, vomiting, diarrhea, constipation, or dehydration. If these symptoms occur, she should be seen and evaluated medically.         Diagnoses and all orders for this visit:    1. Acute cough (Primary)  -     POCT SARS-CoV-2 Antigen VASILIY + Flu            Follow Up   No follow-ups on file.  Patient was given instructions and counseling regarding her condition or for health maintenance advice. Please see specific information pulled into the AVS if appropriate.     Patient or patient representative verbalized consent for the use of Ambient Listening during the visit with  Geremias Ramesh MD for chart documentation. 2/12/2025  16:01 EST

## 2025-02-12 NOTE — PROGRESS NOTES
Specialty Pharmacy Patient Management Program  Refill Outreach     Liliana was contacted today regarding refills of their medication(s).    Refill Questions      Flowsheet Row Most Recent Value   Changes to allergies? No   Changes to medications? No   New conditions or infections since last clinic visit No   Unplanned office visit, urgent care, ED, or hospital admission in the last 4 weeks  No   How does patient/caregiver feel medication is working? Good   Financial problems or insurance changes  No   If yes, describe changes in insurance or financial issues. N/A   Since the previous refill, were any specialty medication doses or scheduled injections missed or delayed?  No   If yes, please provide the amount N/A   Why were doses missed? N/A   Does this patient require a clinical escalation to a pharmacist? No            Delivery Questions      Flowsheet Row Most Recent Value   Delivery method UPS   Delivery address verified with patient/caregiver? Yes   Delivery address Home   Number of medications in delivery 1   Medication(s) being filled and delivered Galcanezumab-gnlm (EMGALITY)   Doses left of specialty medications N/A   Copay verified? Yes   Copay amount Emgality =$35.00 copay   Copay form of payment Credit/debit on file   Ship Date 02/14   Delivery Date Selection 02/15/25   Signature Required No            Medication Adherence    Adherence tools used: calendar  Support network for adherence: healthcare provider   Other support network: Pharmacy             Follow-up: 28 day(s)     Beulah Ortiz  2/12/2025  09:11 EST

## 2025-02-12 NOTE — ADDENDUM NOTE
Addended by: JAVY XIONG on: 2/12/2025 10:07 AM     Modules accepted: Orders     DISPLAY PLAN FREE TEXT

## 2025-02-17 ENCOUNTER — OFFICE VISIT (OUTPATIENT)
Dept: NEUROLOGY | Facility: CLINIC | Age: 60
End: 2025-02-17
Payer: COMMERCIAL

## 2025-02-17 VITALS
DIASTOLIC BLOOD PRESSURE: 88 MMHG | WEIGHT: 245.8 LBS | OXYGEN SATURATION: 94 % | HEART RATE: 71 BPM | SYSTOLIC BLOOD PRESSURE: 122 MMHG | BODY MASS INDEX: 43.55 KG/M2 | HEIGHT: 63 IN

## 2025-02-17 DIAGNOSIS — G47.33 OBSTRUCTIVE SLEEP APNEA SYNDROME: Chronic | ICD-10-CM

## 2025-02-17 DIAGNOSIS — G43.C0 PERIODIC HEADACHE SYNDROME, NOT INTRACTABLE: Primary | Chronic | ICD-10-CM

## 2025-02-17 PROCEDURE — 99214 OFFICE O/P EST MOD 30 MIN: CPT | Performed by: PSYCHIATRY & NEUROLOGY

## 2025-02-17 RX ORDER — RIZATRIPTAN BENZOATE 10 MG/1
10 TABLET ORAL ONCE AS NEEDED
Qty: 36 TABLET | Refills: 3 | Status: SHIPPED | OUTPATIENT
Start: 2025-02-17

## 2025-02-17 NOTE — PROGRESS NOTES
"Chief Complaint  Headache    Subjective        Liliana Quevedo presents to [facility]  History of Present Illness    59 y.o. female returns in follow up.  Last visit on 2/16/23 continued Emgality, Maxalt, CPAP mask and supplies.      Migraines     HA frequency is twice a month.  Last for few minutes.     Aborts with Maxalt.         Compliant with CPAP and receiving benefit.      Problem history:     MRI Brain 11/19/2020 normal, question of abnl in L yulisa is artifact  C U/S normal   Labs 10/5/2020 TSH, CBC, CMP, Vit D NCS   Echo - normal 12/9/2020    Pt with c/o dizziness and near syncope, CP.  Reports feeling hot, clammy, dizziness lasting for 25 - 30 minutes.  Sx can occur sitting or standing.  Holter 2017 unremarkable.      Sensation of feeling, drained, blurry vision and has to sit down.  Sitting down helps.  Assoc with HA.  Located in R forehead.  Sharp and throbbing.  HA always assoc with sx.  Frequency is daily.  Lasts for an hour.  Tylenol of some benefit.  Dizziness assoc with sx.       Objective   Vital Signs:  /88   Pulse 71   Ht 160 cm (62.99\")   Wt 111 kg (245 lb 12.8 oz)   SpO2 94%   BMI 43.56 kg/m²   Estimated body mass index is 43.56 kg/m² as calculated from the following:    Height as of this encounter: 160 cm (62.99\").    Weight as of this encounter: 111 kg (245 lb 12.8 oz).        Neurological Exam  Mental Status  Awake, alert and oriented to person, place and time. Oriented to person, place and time. Speech is normal. Language is fluent with no aphasia. Attention and concentration are normal. Fund of knowledge is appropriate for level of education.    Cranial Nerves  CN III, IV, VI: Extraocular movements intact bilaterally. Pupils equal round and reactive to light bilaterally.  CN V: Facial sensation is normal.  CN VII: Full and symmetric facial movement.  CN IX, X: Palate elevates symmetrically  CN XI: Shoulder shrug strength is normal.  CN XII: Tongue " midline without atrophy or fasciculations.    Motor   Strength is 5/5 throughout all four extremities.    Sensory  Sensation is intact to light touch, pinprick, vibration and proprioception in all four extremities.    Reflexes  Deep tendon reflexes are 2+ and symmetric in all four extremities.    Coordination    Finger-to-nose, rapid alternating movements and heel-to-shin normal bilaterally without dysmetria.    Gait  Normal casual, toe, heel and tandem gait.       Physical Exam  Eyes:      Extraocular Movements: Extraocular movements intact.      Pupils: Pupils are equal, round, and reactive to light.   Neurological:      Motor: Motor strength is normal.     Coordination: Coordination is intact.      Deep Tendon Reflexes: Reflexes are normal and symmetric.   Psychiatric:         Speech: Speech normal.        Result Review :  The following data was reviewed by: Kurt Brand MD on 02/17/2025:              Assessment and Plan   Diagnoses and all orders for this visit:    1. Periodic headache syndrome, not intractable (Primary)  -     rizatriptan (MAXALT) 10 MG tablet; Take 1 tablet by mouth 1 (One) Time As Needed for Migraine.  Dispense: 36 tablet; Refill: 3  -     galcanezumab-gnlm (EMGALITY) 120 MG/ML auto-injector pen; Inject 1 mL under the skin into the appropriate area as directed Every 28 (Twenty-Eight) Days.    2. Obstructive sleep apnea syndrome  Assessment & Plan:  Orders new mask and supplies on [employer] from [employer].               Follow Up   No follow-ups on file.  Patient was given instructions and counseling regarding her condition or for health maintenance advice. Please see specific information pulled into the AVS if appropriate.

## 2025-02-21 ENCOUNTER — PATIENT ROUNDING (BHMG ONLY) (OUTPATIENT)
Dept: NEUROLOGY | Facility: CLINIC | Age: 60
End: 2025-02-21
Payer: COMMERCIAL

## 2025-02-21 DIAGNOSIS — R05.1 ACUTE COUGH: Primary | ICD-10-CM

## 2025-02-21 DIAGNOSIS — J10.1 INFLUENZA A: ICD-10-CM

## 2025-02-21 RX ORDER — AZITHROMYCIN 250 MG/1
TABLET, FILM COATED ORAL
Qty: 6 TABLET | Refills: 0 | Status: SHIPPED | OUTPATIENT
Start: 2025-02-21

## 2025-02-21 RX ORDER — DEXTROMETHORPHAN HYDROBROMIDE AND PROMETHAZINE HYDROCHLORIDE 15; 6.25 MG/5ML; MG/5ML
5 SYRUP ORAL 4 TIMES DAILY PRN
Qty: 180 ML | Refills: 2 | Status: SHIPPED | OUTPATIENT
Start: 2025-02-21

## 2025-02-24 ENCOUNTER — HOSPITAL ENCOUNTER (OUTPATIENT)
Dept: MAMMOGRAPHY | Facility: HOSPITAL | Age: 60
Discharge: HOME | End: 2025-02-24
Admitting: OBSTETRICS & GYNECOLOGY
Payer: COMMERCIAL

## 2025-02-24 DIAGNOSIS — Z12.31 SCREENING MAMMOGRAM FOR BREAST CANCER: ICD-10-CM

## 2025-02-24 DIAGNOSIS — J30.9 ALLERGIC RHINITIS: ICD-10-CM

## 2025-02-24 PROCEDURE — 77067 SCR MAMMO BI INCL CAD: CPT

## 2025-02-24 PROCEDURE — 77063 BREAST TOMOSYNTHESIS BI: CPT

## 2025-02-24 RX ORDER — MONTELUKAST SODIUM 10 MG/1
TABLET ORAL
Qty: 90 TABLET | Refills: 1 | Status: SHIPPED | OUTPATIENT
Start: 2025-02-24

## 2025-02-27 PROCEDURE — 77063 BREAST TOMOSYNTHESIS BI: CPT | Performed by: RADIOLOGY

## 2025-02-27 PROCEDURE — 77067 SCR MAMMO BI INCL CAD: CPT | Performed by: RADIOLOGY

## 2025-03-14 ENCOUNTER — SPECIALTY PHARMACY (OUTPATIENT)
Dept: ONCOLOGY | Facility: HOSPITAL | Age: 60
End: 2025-03-14
Payer: COMMERCIAL

## 2025-03-14 NOTE — PROGRESS NOTES
Specialty Pharmacy Patient Management Program  Refill Outreach     Liliana was contacted today regarding refills of their medication(s).    Refill Questions      Flowsheet Row Most Recent Value   Changes to allergies? No   Changes to medications? No   New conditions or infections since last clinic visit No   Unplanned office visit, urgent care, ED, or hospital admission in the last 4 weeks  No   How does patient/caregiver feel medication is working? Good   Financial problems or insurance changes  No   If yes, describe changes in insurance or financial issues. N/A   Since the previous refill, were any specialty medication doses or scheduled injections missed or delayed?  No   If yes, please provide the amount N/A   Why were doses missed? N/A   Does this patient require a clinical escalation to a pharmacist? No            Delivery Questions      Flowsheet Row Most Recent Value   Delivery method UPS   Delivery address verified with patient/caregiver? Yes   Delivery address Home   Number of medications in delivery 1   Medication(s) being filled and delivered Galcanezumab-gnlm (EMGALITY)   Doses left of specialty medications Emgality=0   Copay verified? Yes   Copay amount Emgality =$35.00 copay   Copay form of payment Credit/debit on file   Delivery Date Selection 03/18/25   Signature Required No            Medication Adherence    Adherence tools used: calendar  Support network for adherence: healthcare provider   Other support network: Pharmacy             Follow-up: 28 day(s)     Michelle Barbosa, Pharmacy Technician  3/14/2025  13:38 EDT

## 2025-04-07 DIAGNOSIS — F33.0 MILD EPISODE OF RECURRENT MAJOR DEPRESSIVE DISORDER: ICD-10-CM

## 2025-04-07 NOTE — TELEPHONE ENCOUNTER
Caller: EmyLiliana    Relationship: Self    Best call back number: [phone]     Requested Prescriptions:   Requested Prescriptions     Pending Prescriptions Disp Refills    citalopram (CeleXA) 40 MG tablet 90 tablet 1     Sig: Take 1 tablet by mouth Daily.        Pharmacy where request should be sent: [facility] [id number] [address] [facility] [phone] [facility] [phone] FX     Last office visit with prescribing clinician: 2/12/2025   Last telemedicine visit with prescribing clinician: Visit date not found   Next office visit with prescribing clinician: Visit date not found     Additional details provided by patient: PATIENT HAS CALLED REQUESTING A REFILL ON ABOVE MEDICATION.     Does the patient have less than a 3 day supply:  [x] Yes  [] No    Would you like a call back once the refill request has been completed: [] Yes [x] No    If the office needs to give you a call back, can they leave a voicemail: [] Yes [x] No    Efrain Solis   04/07/25 15:44 EDT

## 2025-04-09 RX ORDER — CITALOPRAM HYDROBROMIDE 40 MG/1
40 TABLET ORAL DAILY
Qty: 90 TABLET | Refills: 0 | Status: SHIPPED | OUTPATIENT
Start: 2025-04-09

## 2025-04-09 NOTE — TELEPHONE ENCOUNTER
PATIENT HAS CALLED REQUESTING A CALL BACK WITH STATUS OF PRESCRIPTION REQUEST. PATIENT HAS AN APPOINTMENT SCHEDULED FOR 04/16/25.    CALL BACK NUMBER [phone]

## 2025-04-16 ENCOUNTER — OFFICE VISIT (OUTPATIENT)
Dept: INTERNAL MEDICINE | Facility: CLINIC | Age: 60
End: 2025-04-16
Payer: COMMERCIAL

## 2025-04-16 ENCOUNTER — SPECIALTY PHARMACY (OUTPATIENT)
Dept: ONCOLOGY | Facility: HOSPITAL | Age: 60
End: 2025-04-16
Payer: COMMERCIAL

## 2025-04-16 VITALS
SYSTOLIC BLOOD PRESSURE: 120 MMHG | TEMPERATURE: 98 F | RESPIRATION RATE: 16 BRPM | HEART RATE: 56 BPM | WEIGHT: 244.13 LBS | BODY MASS INDEX: 43.26 KG/M2 | DIASTOLIC BLOOD PRESSURE: 70 MMHG

## 2025-04-16 DIAGNOSIS — Z13.220 LIPID SCREENING: ICD-10-CM

## 2025-04-16 DIAGNOSIS — F33.0 MILD EPISODE OF RECURRENT MAJOR DEPRESSIVE DISORDER: ICD-10-CM

## 2025-04-16 DIAGNOSIS — M19.90 ARTHRITIS: Primary | ICD-10-CM

## 2025-04-16 NOTE — PROGRESS NOTES
Chief Complaint  Med Refill    Subjective    Liliana Quevedo is a 59 y.o. female.     Liliana Quevedo presents to [facility] for     History of Present Illness  The patient presents for a follow-up visit to refill her medications and for medical management of arthritis and depression.    Fatigue is reported, and it was noted that a visit was required for medication refills. No specific health concerns are currently experienced. The last visit occurred in 02/2025, during which influenza was diagnosed. A physical exam is due. A sleep study was conducted in 2017, and subsequent weight gain has caused occasional discomfort.    Celexa has been taken for the past 17 years, which is found to be beneficial. However, there are occasional feelings of needing an alternative treatment. Uncertainty exists whether these feelings are situational or related to age. Celexa was initially started following the death of her mother, and there is uncertainty about the duration of use being excessive. Celexa is currently being taken.    Bone pain is reported, attributed to arthritis, though mobility is maintained. Symptoms are managed with Tylenol and occasionally Arthritis Tylenol.       The following portions of the patient's history were reviewed and updated as appropriate: allergies, current medications, past family history, past medical history, past social history, past surgical history, and problem list.    Review of Systems   All other systems reviewed and are negative.      Objective   Body mass index is 43.26 kg/m².          Vital Signs:   /70 (BP Location: Right arm, Patient Position: Sitting, Cuff Size: Large Adult)   Pulse 56   Temp 98 °F (36.7 °C) (Temporal)   Resp 16   Wt 111 kg (244 lb 2 oz)   BMI 43.26 kg/m²       Physical Exam  General: Alert and oriented x3, in no apparent distress  Head: Normocephalic, atraumatic  Eyes: Pupils equal, reactive to light and  accommodation. Extraocular muscles intact  Mouth/Throat: Moist membranes  Respiratory: Clear to auscultation, no rhonchi, no wheezing  Cardiovascular: S1 and S2 normal, no murmurs, rubs, or gallops       Results              Assessment and Plan  Diagnoses and all orders for this visit:  Assessment & Plan  1. Chronic arthritis.  - Condition remains stable.  - Effective management with over-the-counter anti-inflammatories has improved daily activities.  - No new physical exam findings indicating progression.  - Continue current medication regimen.    2. Depression.  Chronic and stable.  - Continues to take Celexa 40 mg, one tablet orally daily.  - Reports fair control over depression with no recent alarming symptoms such as suicidal ideations, emotional lability, or threats towards herself or others.  - Discussed the option of pharmacogenetics testing if she feels her depression is not fully controlled.  - Monitor condition over the next several weeks to months and inform if alternative medications are considered.    3. Laboratory tests.  - Due for CBC, CMP, TSH, free T4, and lipid profile.  - Tests will be conducted as outpatient walk-in labs since she is not fasting today.  - Results will be reviewed to determine if further action is needed.    Follow-up  - Schedule a physical exam for the next follow-up appointment.       Diagnoses and all orders for this visit:    1. Arthritis (Primary)    2. Lipid screening  -     Lipid Panel; Future    3. Mild episode of recurrent major depressive disorder  -     CBC (No Diff); Future  -     Comprehensive Metabolic Panel; Future  -     T4, Free; Future  -     TSH; Future              Follow Up   No follow-ups on file.  Patient was given instructions and counseling regarding her condition or for health maintenance advice. Please see specific information pulled into the AVS if appropriate.     Patient or patient representative verbalized consent for the use of Ambient Listening  during the visit with  Geremias Ramesh MD for chart documentation. 4/16/2025  15:35 EDT

## 2025-04-16 NOTE — PROGRESS NOTES
Specialty Pharmacy Patient Management Program  Refill Outreach     Liliana was contacted today regarding refills of their medication(s).    Refill Questions      Flowsheet Row Most Recent Value   Changes to allergies? No   Changes to medications? No   New conditions or infections since last clinic visit No   Unplanned office visit, urgent care, ED, or hospital admission in the last 4 weeks  No   How does patient/caregiver feel medication is working? Good   Financial problems or insurance changes  No   If yes, describe changes in insurance or financial issues. n/a   If yes, please provide the amount n/a   Why were doses missed? n/a   Does this patient require a clinical escalation to a pharmacist? No            Delivery Questions      Flowsheet Row Most Recent Value   Delivery method UPS   Delivery address verified with patient/caregiver? Yes   Delivery address Home   Number of medications in delivery 1   Medication(s) being filled and delivered Galcanezumab-gnlm (EMGALITY)   Doses left of specialty medications n/a   Copay verified? Yes   Copay amount Emgality = $35.00   Copay form of payment Credit/debit on file   Delivery Date Selection 04/18/25   Signature Required No   Do you consent to receive electronic handouts?  Yes            Medication Adherence    Adherence tools used: calendar  Support network for adherence: healthcare provider   Other support network: Pharmacy             Follow-up: 28 day(s)     Beulah Ortiz  4/16/2025  15:09 EDT

## 2025-04-18 DIAGNOSIS — K21.9 GASTROESOPHAGEAL REFLUX DISEASE WITHOUT ESOPHAGITIS: ICD-10-CM

## 2025-04-18 RX ORDER — FAMOTIDINE 20 MG/1
20 TABLET, FILM COATED ORAL 2 TIMES DAILY
Qty: 180 TABLET | Refills: 2 | Status: SHIPPED | OUTPATIENT
Start: 2025-04-18

## 2025-04-29 ENCOUNTER — TELEPHONE (OUTPATIENT)
Dept: INTERNAL MEDICINE | Facility: CLINIC | Age: 60
End: 2025-04-29
Payer: COMMERCIAL

## 2025-04-29 DIAGNOSIS — K21.9 GASTROESOPHAGEAL REFLUX DISEASE WITHOUT ESOPHAGITIS: ICD-10-CM

## 2025-04-29 RX ORDER — PANTOPRAZOLE SODIUM 40 MG/1
40 TABLET, DELAYED RELEASE ORAL DAILY
Qty: 90 TABLET | Refills: 2 | Status: SHIPPED | OUTPATIENT
Start: 2025-04-29

## 2025-04-29 NOTE — TELEPHONE ENCOUNTER
Patient said she went to pharmacy to  refill because she was told it would be ready at 12 but it wasn't sent in.     Pantoprazole 40 mg tablet once a day

## 2025-05-09 ENCOUNTER — SPECIALTY PHARMACY (OUTPATIENT)
Dept: ONCOLOGY | Facility: HOSPITAL | Age: 60
End: 2025-05-09
Payer: COMMERCIAL

## 2025-05-09 NOTE — PROGRESS NOTES
Specialty Pharmacy Patient Management Program  Refill Outreach     Liliana was contacted today regarding refills of their medication(s).    Refill Questions      Flowsheet Row Most Recent Value   Changes to allergies? No   Changes to medications? No   New conditions or infections since last clinic visit No   Unplanned office visit, urgent care, ED, or hospital admission in the last 4 weeks  No   How does patient/caregiver feel medication is working? Good   Financial problems or insurance changes  No   If yes, describe changes in insurance or financial issues. N/A   Since the previous refill, were any specialty medication doses or scheduled injections missed or delayed?  No   If yes, please provide the amount N/A   Why were doses missed? N/A   Does this patient require a clinical escalation to a pharmacist? No            Delivery Questions      Flowsheet Row Most Recent Value   Delivery method UPS   Delivery address verified with patient/caregiver? Yes   Delivery address Home   Other address preferred N/A   Number of medications in delivery 1   Medication(s) being filled and delivered Galcanezumab-gnlm (EMGALITY)   Doses left of specialty medications N/A   Copay verified? Yes   Copay amount Emgality =$35.00 copay   Copay form of payment Credit/debit on file   Delivery Date Selection 05/16/25   Signature Required No   Do you consent to receive electronic handouts?  Yes            Medication Adherence    Adherence tools used: calendar  Support network for adherence: healthcare provider   Other support network: Pharmacy             Follow-up: 28 day(s)     Beulah Ortiz  5/9/2025  15:43 EDT

## 2025-06-04 ENCOUNTER — HOSPITAL ENCOUNTER (OUTPATIENT)
Dept: GENERAL RADIOLOGY | Facility: HOSPITAL | Age: 60
Discharge: HOME | End: 2025-06-04
Admitting: NURSE PRACTITIONER
Payer: COMMERCIAL

## 2025-06-04 ENCOUNTER — OFFICE VISIT (OUTPATIENT)
Dept: INTERNAL MEDICINE | Facility: CLINIC | Age: 60
End: 2025-06-04
Payer: COMMERCIAL

## 2025-06-04 VITALS
TEMPERATURE: 98.2 F | SYSTOLIC BLOOD PRESSURE: 116 MMHG | DIASTOLIC BLOOD PRESSURE: 72 MMHG | WEIGHT: 243 LBS | OXYGEN SATURATION: 95 % | BODY MASS INDEX: 43.05 KG/M2 | HEIGHT: 63 IN | HEART RATE: 63 BPM

## 2025-06-04 DIAGNOSIS — M25.50 ARTHRALGIA OF MULTIPLE JOINTS: ICD-10-CM

## 2025-06-04 DIAGNOSIS — M79.644 THUMB PAIN, RIGHT: Primary | ICD-10-CM

## 2025-06-04 DIAGNOSIS — M79.644 THUMB PAIN, RIGHT: ICD-10-CM

## 2025-06-04 PROCEDURE — 73130 X-RAY EXAM OF HAND: CPT

## 2025-06-04 RX ORDER — PREDNISONE 10 MG/1
TABLET ORAL
Qty: 21 TABLET | Refills: 0 | Status: SHIPPED | OUTPATIENT
Start: 2025-06-04

## 2025-06-04 NOTE — PROGRESS NOTES
Follow Up Office Visit      Patient Name: Liliana Quevedo  : 1965   MRN: [id number]   Care Team: Patient Care Team:  Geremias Ramesh MD as PCP - General  Geremias Ramesh MD as PCP - Family Medicine  Samm Ashley MD as Consulting Physician (Endocrinology)    Chief Complaint:    Chief Complaint   Patient presents with    Arthritis     Issues with right thumb. Left thumb is swollen       History of Present Illness: Liliana Quevedo is a 59 y.o. female with pertinent medical history significant for hyperlipidemia, vitamin D deficiency, hypothyroidism, GERD, anxiety, depression, osteoporosis, osteoarthritis of multiple joints, ABDULLAHI and asthma.  She presents today for issue of thumb pain.  Notes that she has arthritis in many joints and feels this is likely an issue affecting her thumbs.  She is experiencing discomfort in both thumbs but right is worse than left.  She endorses some associated swelling.  She does note that many years ago, she had an arthritic cyst removed from her right second digit.  She has been wearing a thumb brace at night on the right side but this has not provided much relief of symptoms.  She describes the pain as aching but also sometimes sharp and stabbing.  She feels as though that the pain is preventing her from doing activities such as opening lids.  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  She  has tried over-the-counter Tylenol and ibuprofen but this has not provided much relief of symptoms.        Subjective          I have reviewed and the following portions of the patient's history were updated as appropriate: past family history, past medical history, past social history, past surgical history and problem list.    Medications:     Current Outpatient Medications:     azithromycin (Zithromax) 250 MG tablet, Take 2 tablets the first day, then 1 tablet daily for 4 days., Disp: 6 tablet,  "Rfl: 0    citalopram (CeleXA) 40 MG tablet, Take 1 tablet by mouth Daily., Disp: 90 tablet, Rfl: 0    famotidine (PEPCID) 20 MG tablet, TAKE 1 TABLET BY MOUTH 2 TIMES A DAY, Disp: 180 tablet, Rfl: 2    galcanezumab-gnlm (EMGALITY) 120 MG/ML auto-injector pen, Inject 1 mL under the skin into the appropriate area as directed Every 28 (Twenty-Eight) Days., Disp: 1 mL, Rfl: 11    ibandronate (BONIVA) 150 MG tablet, ibandronate 150 mg tablet  Take 1 tablet every month by oral route for 90 days., Disp: , Rfl:     levocetirizine (XYZAL) 5 MG tablet, TAKE ONE TABLET BY MOUTH EVERY EVENING, Disp: 90 tablet, Rfl: 1    levothyroxine (SYNTHROID, LEVOTHROID) 137 MCG tablet, TAKE 1 TABLET BY MOUTH DAILY, Disp: 90 tablet, Rfl: 3    montelukast (SINGULAIR) 10 MG tablet, TAKE ONE TABLET BY MOUTH ONCE NIGHTLY, Disp: 90 tablet, Rfl: 1    pantoprazole (PROTONIX) 40 MG EC tablet, TAKE 1 TABLET BY MOUTH DAILY, Disp: 90 tablet, Rfl: 2    promethazine-dextromethorphan (PROMETHAZINE-DM) 6.25-15 MG/5ML syrup, Take 5 mL by mouth 4 (Four) Times a Day As Needed for Cough., Disp: 180 mL, Rfl: 2    rizatriptan (MAXALT) 10 MG tablet, Take 1 tablet by mouth 1 (One) Time As Needed for Migraine., Disp: 36 tablet, Rfl: 3    predniSONE (DELTASONE) 10 MG tablet, Take 6 tablets on day 1, 5 tablets day 2, 4 tablets day 3, 3 tablets day 4, 2 tablets day 5, and 1 tablet day 6, Disp: 21 tablet, Rfl: 0    Allergies:   Allergies   Allergen Reactions    Ampicillin Hives    Bactrim [Sulfamethoxazole-Trimethoprim] Hives and GI Intolerance    Sulfa Antibiotics Hives     Hot and cold flashes, N&V, diarrhea       Objective     Physical Exam:  Vital Signs:   Vitals:    06/04/25 1500   BP: 116/72   Pulse: 63   Temp: 98.2 °F (36.8 °C)   TempSrc: Infrared   SpO2: 95%   Weight: 110 kg (243 lb)   Height: 160 cm (62.99\")   PainSc: 9    PainLoc: Finger     Body mass index is 43.06 kg/m².     Physical Exam  Vitals and nursing note reviewed.   Constitutional:       General: " She is not in acute distress.  HENT:      Head: Normocephalic and atraumatic.   Cardiovascular:      Rate and Rhythm: Normal rate and regular rhythm.   Musculoskeletal:         General: Swelling and deformity present.      Comments: Right thumb with significant swelling of the IP joint noted.  There is some tenderness to palpation of this joint.  Other joints of the hand without deformities or swelling noted.  There is good range of motion of the left and right hand but limited range of motion in the right thumb.  The left thumb  without deformities of IP, MCP noted   Skin:     Capillary Refill: Capillary refill takes less than 2 seconds.      Comments: Right hand and thumb without any erythema, heat or break in skin noted.   Neurological:      Mental Status: She is alert. Mental status is at baseline.   Psychiatric:         Mood and Affect: Mood normal.         Assessment / Plan      Assessment/Plan:   Problems Addressed This Visit    ICD-10-CM ICD-9-CM   1. Thumb pain, right  M79.644 729.5   2. Arthralgia of multiple joints  M25.50 719.49      Right thumb pain  - suspect likely arthritic process, xray ordered to rule out infectious process  - Prednisone Dosepak 10 mg x 6-day taper  - Encouraged application of heat with daily stretching exercises and avoid repetitive movements when possible that may aggravate symptoms      Plan of care reviewed with patient at the conclusion of today's visit. Education was provided regarding diagnosis and management.  Patient verbalizes understanding of and agreement with management plan.      Follow Up:   Return if symptoms worsen or fail to improve, for Next scheduled follow up.        CASE Farris  [facility] [address]    Please note that portions of this note were completed with a voice recognition program.

## 2025-06-10 ENCOUNTER — LAB (OUTPATIENT)
Dept: INTERNAL MEDICINE | Facility: CLINIC | Age: 60
End: 2025-06-10
Payer: COMMERCIAL

## 2025-06-10 DIAGNOSIS — Z13.220 LIPID SCREENING: ICD-10-CM

## 2025-06-10 DIAGNOSIS — F33.0 MILD EPISODE OF RECURRENT MAJOR DEPRESSIVE DISORDER: ICD-10-CM

## 2025-06-10 LAB
ALBUMIN SERPL-MCNC: 4.1 G/DL (ref 3.5–5.2)
ALBUMIN/GLOB SERPL: 1.5 G/DL
ALP SERPL-CCNC: 87 U/L (ref 39–117)
ALT SERPL W P-5'-P-CCNC: 16 U/L (ref 1–33)
ANION GAP SERPL CALCULATED.3IONS-SCNC: 6.8 MMOL/L (ref 5–15)
AST SERPL-CCNC: 17 U/L (ref 1–32)
BILIRUB SERPL-MCNC: 0.4 MG/DL (ref 0–1.2)
BUN SERPL-MCNC: 13 MG/DL (ref 6–20)
BUN/CREAT SERPL: 14.3 (ref 7–25)
CALCIUM SPEC-SCNC: 9.2 MG/DL (ref 8.6–10.5)
CHLORIDE SERPL-SCNC: 104 MMOL/L (ref 98–107)
CHOLEST SERPL-MCNC: 200 MG/DL (ref 0–200)
CO2 SERPL-SCNC: 30.2 MMOL/L (ref 22–29)
CREAT SERPL-MCNC: 0.91 MG/DL (ref 0.57–1)
DEPRECATED RDW RBC AUTO: 47.2 FL (ref 37–54)
EGFRCR SERPLBLD CKD-EPI 2021: 72.8 ML/MIN/1.73
ERYTHROCYTE [DISTWIDTH] IN BLOOD BY AUTOMATED COUNT: 14.1 % (ref 12.3–15.4)
GLOBULIN UR ELPH-MCNC: 2.7 GM/DL
GLUCOSE SERPL-MCNC: 74 MG/DL (ref 65–99)
HCT VFR BLD AUTO: 42.5 % (ref 34–46.6)
HDLC SERPL-MCNC: 58 MG/DL (ref 40–60)
HGB BLD-MCNC: 13.7 G/DL (ref 12–15.9)
LDLC SERPL CALC-MCNC: 122 MG/DL (ref 0–100)
LDLC/HDLC SERPL: 2.05 {RATIO}
MCH RBC QN AUTO: 29.1 PG (ref 26.6–33)
MCHC RBC AUTO-ENTMCNC: 32.2 G/DL (ref 31.5–35.7)
MCV RBC AUTO: 90.4 FL (ref 79–97)
PLATELET # BLD AUTO: 230 10*3/MM3 (ref 140–450)
PMV BLD AUTO: 10.1 FL (ref 6–12)
POTASSIUM SERPL-SCNC: 3.8 MMOL/L (ref 3.5–5.2)
PROT SERPL-MCNC: 6.8 G/DL (ref 6–8.5)
RBC # BLD AUTO: 4.7 10*6/MM3 (ref 3.77–5.28)
SODIUM SERPL-SCNC: 141 MMOL/L (ref 136–145)
T4 FREE SERPL-MCNC: 1.49 NG/DL (ref 0.92–1.68)
TRIGL SERPL-MCNC: 115 MG/DL (ref 0–150)
TSH SERPL DL<=0.05 MIU/L-ACNC: 7.18 UIU/ML (ref 0.27–4.2)
VLDLC SERPL-MCNC: 20 MG/DL (ref 5–40)
WBC NRBC COR # BLD AUTO: 6.97 10*3/MM3 (ref 3.4–10.8)

## 2025-06-10 PROCEDURE — 80061 LIPID PANEL: CPT | Performed by: INTERNAL MEDICINE

## 2025-06-10 PROCEDURE — 36415 COLL VENOUS BLD VENIPUNCTURE: CPT | Performed by: INTERNAL MEDICINE

## 2025-06-10 PROCEDURE — 80050 GENERAL HEALTH PANEL: CPT | Performed by: INTERNAL MEDICINE

## 2025-06-10 PROCEDURE — 84439 ASSAY OF FREE THYROXINE: CPT | Performed by: INTERNAL MEDICINE

## 2025-06-12 DIAGNOSIS — J30.2 SEASONAL ALLERGIES: ICD-10-CM

## 2025-06-13 DIAGNOSIS — M79.644 THUMB PAIN, RIGHT: Primary | ICD-10-CM

## 2025-06-13 RX ORDER — LEVOCETIRIZINE DIHYDROCHLORIDE 5 MG/1
5 TABLET, FILM COATED ORAL EVERY EVENING
Qty: 90 TABLET | Refills: 1 | Status: SHIPPED | OUTPATIENT
Start: 2025-06-13

## 2025-06-15 ENCOUNTER — RESULTS FOLLOW-UP (OUTPATIENT)
Dept: INTERNAL MEDICINE | Facility: CLINIC | Age: 60
End: 2025-06-15
Payer: COMMERCIAL

## 2025-06-17 ENCOUNTER — SPECIALTY PHARMACY (OUTPATIENT)
Dept: ONCOLOGY | Facility: HOSPITAL | Age: 60
End: 2025-06-17
Payer: COMMERCIAL

## 2025-06-17 NOTE — PROGRESS NOTES
Specialty Pharmacy Refill Coordination Note     Liliana is a 59 y.o. female contacted today regarding refills of her specialty medication(s).    Specialty medication(s) and dose(s) confirmed: emgality  120 mg/mL  Changes to medications: no  Changes to insurance: no  Reviewed and verified with patient:  Allergies  Meds  Problems         Refill Questions      Flowsheet Row Most Recent Value   Changes to allergies? No   Changes to medications? No   Unplanned office visit, urgent care, ED, or hospital admission in the last 4 weeks  No  [3 weeks ago arthritis in thumb, prednisone, rx, going to hand doctor on thursday.]   How does patient/caregiver feel medication is working? Very good   Financial problems or insurance changes  No   Since the previous refill, were any specialty medication doses or scheduled injections missed or delayed?  No   Does this patient require a clinical escalation to a pharmacist? No            Delivery Questions      Flowsheet Row Most Recent Value   Delivery method UPS   Delivery address verified with patient/caregiver? Yes   Delivery address Home   Number of medications in delivery 1   Medication(s) being filled and delivered Galcanezumab-gnlm (EMGALITY)   Copay verified? Yes   Copay amount $35   Copay form of payment Credit/debit on file   Delivery Date Selection 06/18/25   Signature Required No            Medication Adherence    Adherence tools used: calendar  Support network for adherence: healthcare provider   Other support network: Pharmacy             Follow-up: 4 week(s)     Vic Arvizu, PharmD  6/17/2025   09:19 EDT

## 2025-06-17 NOTE — PROGRESS NOTES
"   Specialty Pharmacy Patient Management Program  Neurology Reassessment     Liliana Quevedo is a 59 y.o. female with migraines seen by a Neurology provider and enrolled in the Neurology Patient Management program offered by [facility].  A follow-up outreach was conducted, including assessment of continued therapy appropriateness, medication adherence, and side effect incidence and management for emgality 120 mg/mL.     Changes to Insurance Coverage or Financial Support  No changes.     Relevant Past Medical History and Comorbidities  Relevant medical history and concomitant health conditions were discussed with the patient. The patient's chart has been reviewed for relevant past medical history and comorbid health conditions and updated as necessary.   Past Medical History:   Diagnosis Date    Acute sinusitis     Assessed By: Geremias Ramesh (Internal Medicine); Last Assessed: 23 Jan 2015    Allergic rhinitis     Carpal tunnel syndrome     Colon polyp     SIGMOID    DDD (degenerative disc disease), lumbar     De Quervain's tenosynovitis     Depression     Edema     Assessed By: Geremias Ramesh (Internal Medicine); Last Assessed: 26 Nov 2014    Elevated alkaline phosphatase level     102    Fatigue     Gastric ulcer     GERD (gastroesophageal reflux disease)     on Protonix.  Says sx's not bad even if misses meds, \"depends on what I eat\".  EGD G[facility] 6/16 40 cm, path DE reflux.  serum h. pyl neg    Hematuria     Hyperlipidemia     Hypertension     per prim MD note    Hypothyroidism     w/ hx thyromegaly s/p CASTILLO.     Incomplete right bundle branch block     Low back pain     Migraine     Mild cardiomegaly     Morbid obesity     Near syncope 06/26/2018    Nephrolithiasis     Osteoarthritis     Peripheral edema     Polyp of sigmoid colon     Scoliosis     Sleep apnea     CPAP compliant    Tendinitis     Viral URI     Assessed By: Geremias Ramesh (Internal Medicine); Last Assessed: 23 Jan 2015    Vitamin D " deficiency     Weight gain     Assessed By: Geremias Ramesh (Internal Medicine); Last Assessed: 26 Nov 2014     Social History     Socioeconomic History    Marital status:    Tobacco Use    Smoking status: Never     Passive exposure: Never    Smokeless tobacco: Never   Vaping Use    Vaping status: Never Used   Substance and Sexual Activity    Alcohol use: No    Drug use: No    Sexual activity: Not Currently     Partners: Male     Birth control/protection: Hysterectomy     Problem list reviewed by Vic Arvizu, PharmD on 6/17/2025 at  9:11 AM    Hospitalizations and Urgent Care Since Last Assessment  ED Visits, Admissions, or Hospitalizations: none; office visit to rheum for arthritis, given prednisone, has appt with hand surgeon this week.  Urgent Office Visits: none     Allergies  Known allergies and reactions were discussed with the patient. The patient's chart has been reviewed for allergy information and updated as necessary.   Allergies   Allergen Reactions    Ampicillin Hives    Bactrim [Sulfamethoxazole-Trimethoprim] Hives and GI Intolerance    Sulfa Antibiotics Hives     Hot and cold flashes, N&V, diarrhea     Allergies reviewed by Vic Arvizu, PharmD on 6/17/2025 at  9:09 AM    Relevant Laboratory Values  Common labs          6/10/2025    09:08   Common Labs   Glucose 74    BUN 13.0    Creatinine 0.91    Sodium 141    Potassium 3.8    Chloride 104    Calcium 9.2    Albumin 4.1    Total Bilirubin 0.4    Alkaline Phosphatase 87    AST (SGOT) 17    ALT (SGPT) 16    WBC 6.97    Hemoglobin 13.7    Hematocrit 42.5    Platelets 230    Total Cholesterol 200    Triglycerides 115    HDL Cholesterol 58    LDL Cholesterol  122        Lab Assessment   The above labs have been reviewed. No dose adjustments are needed for the specialty medication(s) based on the labs.     Current Medication List  This medication list has been reviewed with the patient and evaluated for any interactions or necessary  modifications/recommendations, and updated to include all prescription medications, OTC medications, and supplements the patient is currently taking.  This list reflects what is contained in the patient's profile, which has also been marked as reviewed to communicate to other providers it is the most up to date version of the patient's current medication therapy.     Current Outpatient Medications:     citalopram (CeleXA) 40 MG tablet, Take 1 tablet by mouth Daily., Disp: 90 tablet, Rfl: 0    famotidine (PEPCID) 20 MG tablet, TAKE 1 TABLET BY MOUTH 2 TIMES A DAY, Disp: 180 tablet, Rfl: 2    galcanezumab-gnlm (EMGALITY) 120 MG/ML auto-injector pen, Inject 1 mL under the skin into the appropriate area as directed Every 28 (Twenty-Eight) Days., Disp: 1 mL, Rfl: 11    levocetirizine (XYZAL) 5 MG tablet, TAKE ONE TABLET BY MOUTH EVERY EVENING, Disp: 90 tablet, Rfl: 1    levothyroxine (SYNTHROID, LEVOTHROID) 137 MCG tablet, TAKE 1 TABLET BY MOUTH DAILY, Disp: 90 tablet, Rfl: 3    montelukast (SINGULAIR) 10 MG tablet, TAKE ONE TABLET BY MOUTH ONCE NIGHTLY, Disp: 90 tablet, Rfl: 1    pantoprazole (PROTONIX) 40 MG EC tablet, TAKE 1 TABLET BY MOUTH DAILY, Disp: 90 tablet, Rfl: 2    rizatriptan (MAXALT) 10 MG tablet, Take 1 tablet by mouth 1 (One) Time As Needed for Migraine., Disp: 36 tablet, Rfl: 3    ibandronate (BONIVA) 150 MG tablet, ibandronate 150 mg tablet  Take 1 tablet every month by oral route for 90 days., Disp: , Rfl:     predniSONE (DELTASONE) 10 MG tablet, Take 6 tablets on day 1, 5 tablets day 2, 4 tablets day 3, 3 tablets day 4, 2 tablets day 5, and 1 tablet day 6 (Patient not taking: Reported on 6/17/2025), Disp: 21 tablet, Rfl: 0    Medicines reviewed by Vic Arvizu, PharmD on 6/17/2025 at  9:11 AM    Drug Interactions   No relevant drug drug interactions with specialty medication.       Adverse Drug Reactions  Medication tolerability: Tolerating with no to minimal ADRs          Medication plan:  Continue therapy with normal follow-up  Plan for ADR Management: patient to report      Adherence, Self-Administration, and Current Therapy Problems  Adherence related patient's specialty therapy was discussed with the patient. The Adherence segment of this outreach has been reviewed and updated.        Additional Barriers to Patient Self-Administration: none  Methods for Supporting Patient Self-Administration: Patient is adept with emgality self-injections.      Recently Close Medication Therapy Problems  No medication therapy recommendations to display  Open Medication Therapy Problems  No medication therapy recommendations to display     Goals of Therapy  Goals related to the patient's specialty therapy was discussed with the patient. The Patient Goals segment of this outreach has been reviewed and updated.    Goals Addressed Today        Specialty Pharmacy General Goal      Decrease intensity and frequency of migraines.  4/6/23 - Headaches improving with current treatment - HA frequency is once every two months.  Last for few minutes.   Aborts with Maxalt.        On Average, Reduce:   Frequency of migraines to < 2 per month.   Symptom severity by 90 % within 2 hours of taking acute therapy. (Maxalt)  Duration of migraines to <2 hours.     Baseline Values/Notes on Enrollment  Frequency:  daily HA;  2 migraines/month  Symptom Severity:  mild to severe  Duration: hour    Date of Reassessment Notes on Progress Toward Above Goals   6/17/2025 Uses maxalt 3-4x month, < 2 hours                                            Quality of Life Assessment   Quality of Life related to the patient's enrollment in the patient management program and services provided was discussed with the patient. The QOL segment of this outreach has been reviewed and updated.   Quality of Life Improvement Scale: 8-Moderately better    Reassessment Plan & Follow-Up  Medication Therapy Changes: n/a  Related Plans, Therapy Recommendations, or Issues  to Be Addressed: n/a  Pharmacist to perform regular reassessments no more than (6) months from the previous assessment.  Care Coordinator to set up future refill outreaches, coordinate prescription delivery, and escalate clinical questions to pharmacist.     Attestation  Therapeutic appropriateness: Appropriate  I attest the patient was actively involved in and has agreed to the above plan of care. If the prescribed therapy is at any point deemed not appropriate based on the current or future assessments, a consultation will be initiated with the patient's specialty care provider to determine the best course of action. The revised plan of therapy will be documented along with any additional patient education provided. Discussed aforementioned material with patient by phone.    Vic Arvizu, PharmD, [facility]  Clinic Specialty Pharmacist, Neurology  6/17/2025  09:23 EDT

## 2025-06-19 ENCOUNTER — OFFICE VISIT (OUTPATIENT)
Age: 60
End: 2025-06-19
Payer: COMMERCIAL

## 2025-06-19 VITALS
BODY MASS INDEX: 43.23 KG/M2 | DIASTOLIC BLOOD PRESSURE: 86 MMHG | SYSTOLIC BLOOD PRESSURE: 130 MMHG | WEIGHT: 244 LBS | HEIGHT: 63 IN

## 2025-06-19 DIAGNOSIS — M65.311 TRIGGER FINGER OF RIGHT THUMB: Primary | ICD-10-CM

## 2025-06-19 RX ORDER — TRIAMCINOLONE ACETONIDE 40 MG/ML
20 INJECTION, SUSPENSION INTRA-ARTICULAR; INTRAMUSCULAR
Status: COMPLETED | OUTPATIENT
Start: 2025-06-19 | End: 2025-06-19

## 2025-06-19 RX ORDER — LIDOCAINE HYDROCHLORIDE 10 MG/ML
0.5 INJECTION, SOLUTION EPIDURAL; INFILTRATION; INTRACAUDAL; PERINEURAL
Status: COMPLETED | OUTPATIENT
Start: 2025-06-19 | End: 2025-06-19

## 2025-06-19 RX ADMIN — TRIAMCINOLONE ACETONIDE 20 MG: 40 INJECTION, SUSPENSION INTRA-ARTICULAR; INTRAMUSCULAR at 09:54

## 2025-06-19 RX ADMIN — LIDOCAINE HYDROCHLORIDE 0.5 ML: 10 INJECTION, SOLUTION EPIDURAL; INFILTRATION; INTRACAUDAL; PERINEURAL at 09:54

## 2025-06-19 NOTE — PROGRESS NOTES
"                                                               [facility]     Office Visit       Date: 06/19/2025   Patient Name: Liliana Quevedo  MRN: [id number]  YOB: 1965    Referring Physician: Harriett Moore A*     Chief Complaint:   Chief Complaint   Patient presents with    Right Thumb - Pain       History of Present Illness:   Liliana Quevedo is a 59 y.o. female right-hand-dominant presents for evaluation of right thumb pain of 6 weeks duration.  She reports pain at the A1 pulley of the right thumb as well as catching and locking with flexion at the IP joint.  She has tried bracing anti-inflammatories and oral steroids with minimal improvement of symptoms.  Pain is a 9 out of 10.  She works at [employer].  She denies smoking.  No other relevant medical history.      Subjective   Review of Systems:   Review of Systems     Pertinent review of systems per HPI.     I reviewed the patient's chief complaint, history of present illness, review of systems, past medical history, surgical history, family history, social history, medications and allergy list in the EMR on 06/19/2025 and agree with the findings above.    Objective    Vital Signs:   Vitals:    06/19/25 0943   BP: 130/86   Weight: 111 kg (244 lb)   Height: 160 cm (62.99\")     BMI: Body mass index is 43.23 kg/m².    General Appearance: No acute distress. Alert and oriented.     Chest:  Non-labored breathing on room air. Regular rate and rhythm.    Upper Extremity Exam:    Palpation of the right thumb A1 pulley with palpable nodule.  There is catching and locking with flexion at the IP joint.  Negative Finkelstein's test.  Nontender over the thumb CMC.  Negative CMC shuck test.    Fingers are warm, well-perfused with appropriate capillary refill.  Palpable radial pulse.    Sensation intact to light touch in median, radial and ulnar nerve distributions.    Motor- Fires [employer], ulnar intrinsics, EPL/EDC " w/ full active and passive range of motion. Strength intact.    Non-tender except for in the areas highlighted    Imaging/Studies:   Imaging Results (Last 24 Hours)       ** No results found for the last 24 hours. **            X-ray of the right hand from 6/9/2025) pathology reviewed and interpreted myself demonstrates soft tissue swelling of the right thumb as well as the mild arthritis of the right thumb MCP joint.    Procedures:  Procedures    Quality Measures:   ACP:   ACP discussion was deferred.    Tobacco:   Liliana Quevedo  reports that she has never smoked. She has never been exposed to tobacco smoke. She has never used smokeless tobacco.      Assessment / Plan    Assessment/Plan:     Diagnoses and all orders for this visit:    Trigger finger of right thumb         Liliana Chavez a 59 y.o. female who presents with:      ICD-10-CM ICD-9-CM   1. Trigger finger of right thumb  M65.311 727.03         Patient presents with a right thumb trigger thumb.  We discussed her diagnosis was treatment options including bracing anti-inflammatories corticosteroid injection and surgery.  Recommend right thumb corticosteroid injection today.  Recommend follow-up as needed if symptoms persist.    Procedure Note:    I discussed with the patient the potential benefits of performing therapeutic aspiration and injections as well as potential risks including but not limited to infection, swelling, pain, bleeding, bruising, nerve/vessel damage, skin color changes, transient elevation in blood glucose levels, and fat atrophy. After informed consent and after the areas were prepped with chlorhexadine soap, ethyl chloride was used to numb the skin. 0.5 mL of 1% lidocaine was injected followed by injection of 20mg of Kenalog into the A1 pulley of the right thumb.  The patient tolerated the procedure well. There were no complications. A sterile dressing was placed over the injection sites.      Follow Up:   Return if symptoms  worsen or fail to improve.        Tino Dominique MD  [facility] Hand and Upper Extremity Surgeon

## 2025-06-19 NOTE — PROGRESS NOTES
Procedure   - Hand/Upper Extremity Injection: R thumb A1 for trigger finger on 6/19/2025 9:54 AM  Indications: pain  Details: 30 G needle, volar approach  Medications: 0.5 mL lidocaine PF 1% 1 %; 20 mg triamcinolone acetonide 40 MG/ML  Outcome: tolerated well, no immediate complications  Procedure, treatment alternatives, risks and benefits explained, specific risks discussed. Consent was given by the patient. Immediately prior to procedure a time out was called to verify the correct patient, procedure, equipment, support staff and site/side marked as required. Patient was prepped and draped in the usual sterile fashion.

## 2025-06-20 ENCOUNTER — PATIENT ROUNDING (BHMG ONLY) (OUTPATIENT)
Dept: ORTHOPEDIC SURGERY | Facility: CLINIC | Age: 60
End: 2025-06-20
Payer: COMMERCIAL

## 2025-06-20 NOTE — PROGRESS NOTES
June 20, 2025    Hello, may I speak with Liliana Quevedo?    My name is Ema      I am  with [facility]  [address]  [facility] [id number].    Before we get started may I verify your date of birth? 1965    I am calling to officially welcome you to our practice and ask about your recent visit. Is this a good time to talk? No.  Left a voice message.  Will also send a [employer] message.        Thank you, and have a great day.

## 2025-07-03 ENCOUNTER — OFFICE VISIT (OUTPATIENT)
Age: 60
End: 2025-07-03
Payer: COMMERCIAL

## 2025-07-03 VITALS
OXYGEN SATURATION: 96 % | SYSTOLIC BLOOD PRESSURE: 115 MMHG | WEIGHT: 244 LBS | DIASTOLIC BLOOD PRESSURE: 72 MMHG | HEIGHT: 63 IN | BODY MASS INDEX: 43.23 KG/M2 | HEART RATE: 55 BPM

## 2025-07-03 DIAGNOSIS — E03.9 HYPOTHYROIDISM, UNSPECIFIED TYPE: ICD-10-CM

## 2025-07-03 PROCEDURE — 99214 OFFICE O/P EST MOD 30 MIN: CPT | Performed by: INTERNAL MEDICINE

## 2025-07-03 RX ORDER — LEVOTHYROXINE SODIUM 150 UG/1
150 TABLET ORAL DAILY
Qty: 90 TABLET | Refills: 3 | Status: SHIPPED | OUTPATIENT
Start: 2025-07-03 | End: 2026-07-03

## 2025-07-03 NOTE — PROGRESS NOTES
"     Office Note      Date: 2025  Patient Name: Liliana Quevedo  MRN: [id number]  : 1965    Chief Complaint   Patient presents with    Postablative hypothyroidism       History of Present Illness:   Liliana Quevedo is a 59 y.o. female who presents for Postablative hypothyroidism  .   Current rx: t4- 137     Changes in history:HAD BEEN ON STEROIDS FOR HER THUMB. / NO OTHER CHANGES TO HER MED HX.  HAS BEEN TAKING HER MEDICATION REGULARLY   Questions /problems: SEE ABOVE     Subjective          Review of Systems:   Review of Systems   Constitutional:  Positive for fatigue and unexpected weight change.   HENT:  Negative for trouble swallowing.    Endocrine: Negative for cold intolerance and heat intolerance.   Psychiatric/Behavioral:  Positive for dysphoric mood and sleep disturbance.        The following portions of the patient's history were reviewed and updated as appropriate: allergies, current medications, past family history, past medical history, past social history, past surgical history, and problem list.    Objective     Visit Vitals  /72 (BP Location: Left arm, Patient Position: Sitting)   Pulse 55   Ht 160 cm (63\")   Wt 111 kg (244 lb)   SpO2 96%   BMI 43.22 kg/m²           Physical Exam:  Physical Exam  Vitals reviewed.   Constitutional:       Appearance: Normal appearance.   Neck:      Comments: NO GOITER   Lymphadenopathy:      Cervical: No cervical adenopathy.   Neurological:      Mental Status: She is alert.   Psychiatric:         Mood and Affect: Mood normal.         Behavior: Behavior normal.         Thought Content: Thought content normal.         Judgment: Judgment normal.         Assessment / Plan      Assessment & Plan:  Problem List Items Addressed This Visit       Hypothyroidism    Overview   folloing I 131 therapy for hyperthyroidism          Current Assessment & Plan   WORSE.   TSH >7 WITH TYPICAL SYMPTOMS  PLAN : INCREASE    RECHECK TSH 2 MONTHS. FOLLOW UP 1 YEAR "          Relevant Medications    levothyroxine (Synthroid) 150 MCG tablet    Other Relevant Orders    TSH        Electronically signed by : Samm Ashley MD   07/03/2025

## 2025-07-05 DIAGNOSIS — F33.0 MILD EPISODE OF RECURRENT MAJOR DEPRESSIVE DISORDER: ICD-10-CM

## 2025-07-07 RX ORDER — CITALOPRAM HYDROBROMIDE 40 MG/1
40 TABLET ORAL DAILY
Qty: 90 TABLET | Refills: 1 | Status: SHIPPED | OUTPATIENT
Start: 2025-07-07

## 2025-07-10 ENCOUNTER — SPECIALTY PHARMACY (OUTPATIENT)
Dept: ONCOLOGY | Facility: HOSPITAL | Age: 60
End: 2025-07-10
Payer: COMMERCIAL

## 2025-07-10 NOTE — PROGRESS NOTES
Specialty Pharmacy Patient Management Program  Refill Outreach     Liliana was contacted today regarding refills of their medication(s).    Refill Questions      Flowsheet Row Most Recent Value   Changes to allergies? No   Changes to medications? No   New conditions or infections since last clinic visit No   Unplanned office visit, urgent care, ED, or hospital admission in the last 4 weeks  No   How does patient/caregiver feel medication is working? Good   Financial problems or insurance changes  No   If yes, describe changes in insurance or financial issues. n/a   Since the previous refill, were any specialty medication doses or scheduled injections missed or delayed?  No   If yes, please provide the amount n/a   Why were doses missed? n/a   Does this patient require a clinical escalation to a pharmacist? No            Delivery Questions      Flowsheet Row Most Recent Value   Delivery method UPS   Delivery address Home   Other address preferred n/a   Number of medications in delivery 1   Medication(s) being filled and delivered Galcanezumab-gnlm (EMGALITY)   Doses left of specialty medications Emgality = 0 injections   Copay verified? Yes   Copay amount $35   Copay form of payment Credit/debit on file   Delivery Date Selection 07/11/25   Signature Required No   Do you consent to receive electronic handouts?  Yes            Medication Adherence    Adherence tools used: calendar  Support network for adherence: healthcare provider   Other support network: Pharmacy             Follow-up: 84 day(s)     Gary Hager, Pharmacy Technician  7/10/2025  09:21 EDT

## 2025-08-27 DIAGNOSIS — J30.9 ALLERGIC RHINITIS: ICD-10-CM

## 2025-08-28 RX ORDER — MONTELUKAST SODIUM 10 MG/1
10 TABLET ORAL NIGHTLY
Qty: 90 TABLET | Refills: 1 | Status: SHIPPED | OUTPATIENT
Start: 2025-08-28

## (undated) DEVICE — TROCAR: Brand: KII FIOS FIRST ENTRY

## (undated) DEVICE — CANN NASL CO2 DIVIDED A/

## (undated) DEVICE — TP PAPR MICROPORE 2IN

## (undated) DEVICE — AIRWY SZ11

## (undated) DEVICE — ENDOPATH XCEL BLADELESS TROCARS WITH STABILITY SLEEVES: Brand: ENDOPATH XCEL

## (undated) DEVICE — TISSUE RETRIEVAL SYSTEM: Brand: INZII RETRIEVAL SYSTEM

## (undated) DEVICE — FLTR PLUMEPORT LAP W/CONN STRL

## (undated) DEVICE — GLV SURG SIGNATURE TOUCH PF LTX 8 STRL BX/50

## (undated) DEVICE — ENDOPATH XCEL UNIVERSAL TROCAR STABLILITY SLEEVES: Brand: ENDOPATH XCEL

## (undated) DEVICE — SKIN AFFIX SURG ADHESIVE 72/CS 0.55ML: Brand: MEDLINE

## (undated) DEVICE — APPL ST DUPLOSPRAY MIS 40CM

## (undated) DEVICE — 50" SINGLE PATIENT USE HOVERMATT: Brand: SINGLE PATIENT USE HOVERMATT

## (undated) DEVICE — PK BARIATRIC 10

## (undated) DEVICE — ECHELON FLEX POWERED PLUS LONG ARTICULATING ENDOSCOPIC LINEAR CUTTER, 60MM: Brand: ECHELON FLEX

## (undated) DEVICE — CONTN GRAD MEAS TRIANG 32OZ BLK

## (undated) DEVICE — SUT MONOCRYL PLS ANTIB UND 3/0  PS1 27IN

## (undated) DEVICE — DEFOGGER!" ANTI FOG KIT: Brand: DEROYAL

## (undated) DEVICE — ENCORE® LATEX MICRO SIZE 7.5, STERILE LATEX POWDER-FREE SURGICAL GLOVE: Brand: ENCORE

## (undated) DEVICE — SYS CLS PORTSITE CT CLOSESURE 5AND10/12

## (undated) DEVICE — HARMONIC HD 1000I SHEARS, 36CM SHAFT LENGTH: Brand: HARMONIC

## (undated) DEVICE — [HIGH FLOW HEATED INSUFFLATOR TUBING,  DO NOT USE IF PACKAGE IS DAMAGED]